# Patient Record
Sex: FEMALE | Race: WHITE
[De-identification: names, ages, dates, MRNs, and addresses within clinical notes are randomized per-mention and may not be internally consistent; named-entity substitution may affect disease eponyms.]

---

## 2018-05-22 LAB
BUN BLD-MCNC: 38 MG/DL (ref 6–20)
GLUCOSE SERPLBLD-MCNC: 315 MG/DL (ref 65–120)
HCT VFR BLD CALC: 36.7 % (ref 36–45)
INR BLD: 1.32
LYMPHOCYTES # SPEC AUTO: 0.8 K/UL (ref 0.7–4.9)
MCH RBC QN AUTO: 26.4 PG (ref 27–35)
MCV RBC: 83 FL (ref 80–100)
PMV BLD: 8.8 FL (ref 7.6–11.3)
POTASSIUM SERPL-SCNC: 4.4 MEQ/L (ref 3.6–5)
RBC # BLD: 4.43 M/UL (ref 3.86–4.86)

## 2018-05-22 NOTE — RAD REPORT
EXAM DESCRIPTION:  RAD - Chest Pa And Lat (2 Views) - 5/22/2018 2:18 pm

 

CLINICAL HISTORY:  Preop chest, pending pacemaker exchange

 

COMPARISON:  Chest August 2017

 

TECHNIQUE:  PA and lateral views of the chest were obtained.

 

FINDINGS:  The lungs are normal volume. Masslike density in the right midlung field is still present.
 Review of prior reports indicates a lung cancer history. No acute failure findings.   Cardiomegaly i
s present. No pleural effusion or pneumothorax seen.  No acute bony finding noted.  No aortic abnorma
lity.  Left subclavian pacemaker in place.

 

IMPRESSION:  Cardiomegaly without acute failure finding.

 

Masslike density right midlung field similar or less prominent than seen August 2017. The right lung 
field finding is presumed to be part of a lung cancer process previously detailed.

## 2018-05-22 NOTE — EKG
Test Date:    2018-05-22               Test Time:    13:30:01

Technician:   LYUBOV                                    

                                                     

MEASUREMENT RESULTS:                                       

Intervals:                                           

Rate:         62                                     

MS:                                                  

QRSD:         204                                    

QT:           526                                    

QTc:          533                                    

Axis:                                                

P:                                                   

MS:                                                  

QRS:          -63                                    

T:            117                                    

                                                     

INTERPRETIVE STATEMENTS:                                       

                                                     

Electronic ventricular pacemaker

Compared to ECG 10/11/2017 14:31:24

Sinus rhythm no longer present

First degree AV block no longer present

Right bundle-branch block no longer present

Left anterior fascicular block no longer present

Bifascicular block no longer present



Electronically Signed On 05-22-18 18:33:29 CDT by Ronald Davila

## 2018-05-23 ENCOUNTER — HOSPITAL ENCOUNTER (OUTPATIENT)
Dept: HOSPITAL 97 - CCL | Age: 83
Discharge: HOME HEALTH | End: 2018-05-23
Attending: SPECIALIST
Payer: COMMERCIAL

## 2018-05-23 VITALS — DIASTOLIC BLOOD PRESSURE: 73 MMHG | SYSTOLIC BLOOD PRESSURE: 157 MMHG | OXYGEN SATURATION: 97 % | TEMPERATURE: 98.2 F

## 2018-05-23 DIAGNOSIS — E11.9: ICD-10-CM

## 2018-05-23 DIAGNOSIS — I10: ICD-10-CM

## 2018-05-23 DIAGNOSIS — E78.2: ICD-10-CM

## 2018-05-23 DIAGNOSIS — Z45.010: Primary | ICD-10-CM

## 2018-05-23 DIAGNOSIS — I48.3: ICD-10-CM

## 2018-05-23 PROCEDURE — 85610 PROTHROMBIN TIME: CPT

## 2018-05-23 PROCEDURE — 33228 REMV&REPLC PM GEN DUAL LEAD: CPT

## 2018-05-23 PROCEDURE — 85025 COMPLETE CBC W/AUTO DIFF WBC: CPT

## 2018-05-23 PROCEDURE — 93005 ELECTROCARDIOGRAM TRACING: CPT

## 2018-05-23 PROCEDURE — 36415 COLL VENOUS BLD VENIPUNCTURE: CPT

## 2018-05-23 PROCEDURE — 80048 BASIC METABOLIC PNL TOTAL CA: CPT

## 2018-05-23 PROCEDURE — 71046 X-RAY EXAM CHEST 2 VIEWS: CPT

## 2018-05-23 PROCEDURE — 88300 SURGICAL PATH GROSS: CPT

## 2018-05-23 PROCEDURE — 85730 THROMBOPLASTIN TIME PARTIAL: CPT

## 2018-05-23 PROCEDURE — 82962 GLUCOSE BLOOD TEST: CPT

## 2018-05-23 NOTE — OP
Surgeon:  Nimesh Norris MD



Primary Care Physician:  Terry Gay M.D.



Procedure Performed:  Pacemaker generator replacement.



Indication:  Pacemaker reached an [place] trigger.



Procedure In Detail:  The patient was brought to the cardiac cath lab in a fasting state.  She had be
en off Eliquis, her last dose was on May 21st at 6 p.m., this is May 23rd.  She gave informed consent
.  She was prepared and draped in the usual sterile fashion, sedated with combination of fentanyl and
 versed, titrated to an adequate level of sedation.  The pacemaker device she had was implanted into 
the left pectoral region, was identified through palpation.  A 1% lidocaine was used to anesthetize t
he skin.  A sharp incision was made through the skin, blunt dissection was carried down to the pocket
.  Pocket was opened.  The old pacemaker was explanted.  A sponge was placed in the empty pocket, it 
was enlarged slightly using blunt dissection with gloved fingers.  The leads were removed from the ol
d pacemaker, placed on the new pacemaker.  The leads were tested via telemetry through the new pacema
ker.  Everything was found to be adequate.  The sponge was removed from the pocket.  The pocket was i
rrigated with antibiotic solution and the device was implanted into the pocket.  Pocket was closed wi
th a layer of 0 Polysorb, subcutaneous layer with 3-0 Polysorb, and a cutaneous layer of stainless st
eel staples.  There were no complications from the procedure. 



The new pacemaker is made by [employer], it is a model named [employer] MRI  IS-1, model L311,
 serial [id number].  The pacemaker explanted was implanted on 08/31/2011, explanted today.  It was a [employer] model S603, serial [id number], explanted because of normal battery depletion.  The lead in the right 
atrium was a [employer] Fineline II EZ Sterox bipolar, model 4469, serial #[id number]. The right ventricular
 lead is a [employer] IS-1 Bi Positive Fix, model 4135, serial #[id number] also made by [employer]. Both of t
hese leads were tested and reused.



Electrophysiologic Data:  At the right atrial, P-wave amplitude was 1.9 mV, pacing threshold was 0.5 
V at 0.4 milliseconds, impedance 458 ohms.  Right ventricular lead gave us an R-wave of 6.5 mV, pacin
g threshold 1.5 V at 0.4 milliseconds, 470 ohms.  Its set DDD rate response is , right atrial s
ensitivity 0.5, right ventricular 2.5.  Right atrial output 2.5 V, right ventricular output auto 0.4 
milliseconds for both, both are paced and sensed bipolar.  At the end of the procedure, sponge counts
 were correct.  Needle counts and instrument counts all correct.



Complications:  Complications from the procedure, none.



Estimated Blood Loss:  5 cc.



First Assistant:  Leatha Bergeron.





ADIEL/NATIVIDAD

DD:  05/23/2018 10:39:46Voice ID:  [id number]

DT:  05/23/2018 19:19:58Report ID:  [id number]

## 2018-05-23 NOTE — XMS REPORT
Clinical Summary

 Created on:May 23, 2018



Patient:Valarie Milian

Sex:Female

:1935

External Reference #:[id number]





Demographics







 Address  [address]



   [place] [address]

 

 Home Phone  [phone]

 

 Home Phone  [phone]

 

 Email Address  [email or web address]

 

 Preferred Language  English

 

 Marital Status  

 

 Sikhism Affiliation  Unknown

 

 Race  White

 

 Ethnic Group  Not  or 









Author







 Organization  [facility]

 

 Address  [address] [place] [address]









Support







 Name  Relationship  Address  Phone

 

 Nayely Milian  Child  [address]  [phone]



     [place] [address]  









Care Team Providers







 Name  Role  Phone

 

 Terry Gay MD  Primary Care Provider  [phone]









Allergies







 Active Allergy  Reactions  Severity  Noted Date  Comments

 

 Propafenone      2016  







Current Medications







 Prescription  Sig.  Disp.  Refills  Start Date  End Date  Status

 

 metFORMIN  Take 500 mg by          Active



 (GLUCOPHAGE) 500 MG  mouth 2 (two)          



 tablet  times a day          



   with meals.          

 

 lisinopril  Take 10 mg by          Active



 (PRINIVIL,ZESTRIL) 10  mouth daily.          



 MG tablet            

 

 apixaban (ELIQUIS) 5  Take 2.5 mg by          Active



 mg tablet  mouth 2 (two)          



   times a day.          

 

 sertraline (ZOLOFT)  Take 50 mg by          Active



 50 MG tablet  mouth daily.          

 

 atorvastatin  Take 40 mg by          Active



 (LIPITOR) 40 MG  mouth daily          



 tablet  with dinner.          

 

 lansoprazole  Take 30 mg by          Active



 (PREVACID) 30 MG  mouth daily          



 capsule  before          



   breakfast.          

 

 diphenhydrAMINE  Take 25 mg by          Active



 (BENADRYL) 25 mg  mouth nightly          



 tablet  as needed for          



   sleep.          

 

 cyanocobalamin 1000  Take 1,000 mcg          Active



 MCG tablet  by mouth          



   daily.          

 

 rosiglitazone  Take 2 mg by          Discontinued



 (AVANDIA) 2 MG tablet  mouth 2 (two)        7  



   times a day.          

 

 furosemide (LASIX) 40  Take 40 mg by          Discontinued



 mg tablet  mouth 2 (two)        7  



   times a day.          

 

 carvedilol (COREG)  Take 6.25 mg          Discontinued



 6.25 MG tablet  by mouth 2        7  



   (two) times a          



   day with          



   meals.          

 

 apixaban (ELIQUIS) 5  Take 5 mg by          Discontinued



 mg tablet  mouth 2 (two)        7  



   times a day.          

 

 metoprolol succinate  Take 1 tablet  30 tablet  0  2017  10/05/201  




 XL (TOPROL-XL) 50 mg  (50 mg total)        7  



 24 hr tablet  by mouth daily          



   for 30 days.          

 

 insulin GLARGINE  Inject 25  7.5 mL  0  2017  10/05/201  



 (LANTUS) 100 unit/mL  Units under        7  



 injection (vial)  the skin          



   nightly for 30          



   days.          

 

 insulin lispro  Inject 8 Units  10 mL  12  2017  10/05/201  



 (HumaLOG) 100 unit/mL  under the skin        7  



 injection  3 (three)          



   times a day          



   with meals for          



   30 days.          

 

 furosemide (LASIX) 20  Take 1 tablet  30 tablet  0  2017  10/05/201  




 mg tablet  (20 mg total)        7  



   by mouth daily          



   for 30 days.          

 

 HYDROcodone-acetamino  Take 1 tablet      2017  



 phen (NORCO)   by mouth every        7  



 mg per tablet  6 (six) hours          



   as needed for          



   moderate pain          



   or severe pain          



   for up to 14          



   days. Max          



   Daily Amount:          



   4 tablets          

 

 aspirin 81 mg  Chew 1 tablet  30 tablet  0  2017  10/05/201  



 chewable tablet  (81 mg total)        7  



   daily for 30          



   days.          

 

 nystatin (MYCOSTATIN)  Apply  15 g    2017  10/05/201  



 100,000 unit/gram  topically 2        7  



 powder  (two) times a          



   day for 30          



   days.          

 

 isosorbide  Take 1 tablet  30 tablet  0  2017  10/05/201  



 mononitrate (IMDUR)  (30 mg total)        7  



 30 MG 24 hr tablet  by mouth daily          



   for 30 days.          







Active Problems







 Problem  Noted Date

 

 Intertrochanteric fracture of left femur  2017

 

 Systolic heart failure, chronic  2017

 

 Acute kidney injury superimposed on CKD  2017

 

 Type 2 diabetes mellitus with hyperglycemia  2017

 

 Hip fracture requiring operative repair  2017







Encounters







 Date  Type  Specialty  Care Team  Description

 

 10/13/2017  Telephone  Woo Haley,  



     Surgery  MD  

 

 2017  Office Visit  Orthopedic  Woo Bee,  Left hip pain (Primary Dx
);



     Surgery  MD  Intertrochanteric fracture of left femur, closed, with 
routine healing, subsequent encounter

 

 2017  Orders Only  Orthopedic  Woo Bee,  Intertrochanteric



     Surgery  MD  fracture of left femur,



         closed, with routine



         healing, subsequent



         encounter (Primary Dx)

 

 2017  Abstract  Woo Haley Surgery MD  

 

 2017  Office Visit  Woo Haley  Intertrochanteric



     Surgery  MD  fracture of left femur,



         closed, with routine



         healing, subsequent



         encounter (Primary Dx)

 

 2017  Anesthesia Event  Orthopedic  Ofelia,  



     Surgery  MD Marie  

 

 2017  Procedure Pass  Orthopedic    



     Surgery    

 

 2017  Surgery  Woo Haley,  RADHA,



     Surgery  MD  INTRAMEDULLARY NAIL,



         TIBIA

 

 2017  Procedure Pass  General Surgery    

 

 2017  Anesthesia Event  General Surgery  Conrad Zaragoza MD  

 

 2017  Orders Only  Procedural  Brittany Monaconda  



     Cardiology    

 

 2017  Procedure Pass  General Surgery    

 

 2017 -  Hospital Encounter  Orthopedic  Israel,  



 2017    Surgery  MD Sis Montez Ejaz,  



       Sherine Ruby MD  



after 2017



Social History







 Tobacco Use  Types  Packs/Day  Years Used  Date

 

 Never Smoker        









 Alcohol Use  Drinks/Week  oz/Week  Comments

 

 No      









 Sex Assigned at Birth  Date Recorded

 

 Not on file  







Last Filed Vital Signs







 Vital Sign  Reading  Time Taken

 

 Blood Pressure  135/61  2017  7:24 PM CDT

 

 Pulse  64  2017  7:24 PM CDT

 

 Temperature  35.7 C (96.3 F)  2017  7:24 PM CDT

 

 Respiratory Rate  16  2017  7:24 PM CDT

 

 Oxygen Saturation  95%  2017  7:24 PM CDT

 

 Inhaled Oxygen Concentration  -  -

 

 Weight  88.9 kg (196 lb)  2017  5:13 PM CDT

 

 Height  152.4 cm (5')  2017  5:13 PM CDT

 

 Body Mass Index  38.28  2017  5:13 PM CDT







Plan of Treatment







 Health Maintenance  Due Date  Last Done  Comments

 

 DIABETIC FOOT EXAM  1945    

 

 DIABETIC RETINAL EYE EXAM  1945    

 

 SHINGRIX VACCINE (#1)  1985    

 

 ZOSTER VACCINE  1995    

 

 PNEUMOCOCCAL POLYSACCHARIDE VACCINE AGE 65 AND OVER  2000    

 

 PNEUMOCOCCAL-13  2000    

 

 INFLUENZA VACCINE  2018    







Implants







 Implanted  Type  Area    Device  Expiration  Model /



         Identifier  Date  Serial /



             Lot

 

 T2 F/T Locking Screw 5mmx32.5mm - [id number]  IPM IMPLANT  Left:  SIRENA      
1896 [id number] /



 Implanted: 2017 (Quantity not on file)  DEVICES  Femur  ORTHOPEDICS     
  /

 

 Nail Im Trchntrc W/ Set Scr Ti 130deg [id number] Strl - [id number]  
Orthopedic  Left:  SIRENA    2022  3130 [id number] /



 Implanted: 2017 (Quantity not on file)  Trauma  Femur  ORTHOPEDICS       
/



   Implants          [id number]

 

 Shaft Trnkl Modlr 448mm Strl - [id number]  Orthopedic  N/A: N/A  SIRENA      0227 3000S /



 Implanted: Qty: 1 on 2017 by Woo Bee MD  Trauma    ORTHOPEDICS   
    /



   Implants          [id number]

 

 Screw Bone Lag Ti [id number] - [id number]  Orthopedic  Left:  SIRENA      3060 
0100S /



 Implanted: 2017 (Quantity not on file)  Trauma  Femur  ORTHOPEDICS       
/



   Implants          







Procedures







 Procedure Name  Priority  Date/Time  Associated Diagnosis  Comments

 

 CONSULT TO OSTOMY CARE  Routine  2017  6:55 PM    



 NURSE    CDT    

 

 IA AN ELECTIVE  Routine  2017  2:10 PM    



 ENDOTRACHEAL AIRWAY    CDT    









   Procedure Note - Conrad Zaragoza MD - 2017  2:09 PM CDT



Airway



   Performed by: CONRAD ZARAGOZA



   Authorized by: CONRAD ZARAGOZA



   



   Location:  OR



   Urgency:  Elective



   Difficult Airway: No



   Anesthesiologist:  CONRAD ZARAGOZA



   Performed by:



      anesthesiologist



   Preoxygenated with 100% O2: Yes



   C-spine Precautions Maintained Throughout: No



   Mask assessment prior to intubation: RSI-mask ventilation not attempted.



   Final Airway Type:  Endotracheal airway



   Final Endotracheal Airway:  ETT



   Cuffed: Yes



   Technique Used:  Direct laryngoscopy



   Insertion Site:  Oral



   Blade Type:  Jiménez



   Laryngoscope Blade/Videolaryngoscope Blade Size:  2



   ETT Size (mm):  7.0



   Cuff at minimum occlusion pressure: Yes



   Measured from:  Gums



   ETT to Gums (cm):  20



   Placement Verified by: CO2 detection



   Laryngoscopic view:  Grade I - full view of glottis



   Rapid Sequence Induction (RSI): Yes



   Modified RSI: No



   Number of Attempts at Approach:  1









 INSERTION, INTRAMEDULLARY    2017 11:00 AM CDT    



 NAIL, TIBIA        

 

 ECHOCARDIOGRAM 2D COMPLETE  Routine  2017  7:31 AM CDT    Results for 
this



 W MMODE SPECTRAL COLOR        procedure are in the



 DOPPLER (20994)        results section.



after 2017



Results

POC glucose (2017  5:24 PM)Only the most recent of54 resultswithin the 
time period is included.





 Component  Value  Ref Range

 

 POC glucose  215 (H)  65 - 99 mg/dL



   Comment:  



   [facility] Notified RN  



   Meter ID: [id number]  



   : Jase Hendrickson  



     









 Specimen  Performing Laboratory

 

   [facility] DEPARTMENT OF PATHOLOGY AND GENOMIC MEDICINE







   [address] [place] [address]



Estimated GFR (2017  3:05 AM)Only the most recent of14 resultswithin the 
time period is included.





 Component  Value  Ref Range

 

 GFR Non Af Amer  48 (A)  mL/min/1.73 m2

 

 GFR Af Amer  58 (A)  mL/min/1.73 m2



   Comment:  



   Chronic kidney disease: <60 mL/min/1.73m2  



   Kidney failure: <15 mL/min/1.73m2  



   The estimated GFR is calculated from the IDMS-traceable Modification of Diet
  



   in Renal Disease Equation. The accuracy of the calculation is poor when the  



   creatinine is normal. Calculated values >90 mL/min/1.73m2 are not reported.  



   This equation has not been validated in children (<18 years), pregnant  



   women, the elderly (>70 years), or ethnic groups other than Caucasians and  



    Americans.  



     









 Specimen  Performing Laboratory

 

 Plasma specimen  [facility] DEPARTMENT OF PATHOLOGY AND GENOMIC MEDICINE







   [address] [place] [address]



CBC with platelet and differential (2017  3:05 AM)Only the most recent 
of10 resultswithin the time period is included.





 Component  Value  Ref Range

 

 WBC  6.07  4.50 - 11.00 k/uL

 

 RBC  3.57 (L)  4.20 - 5.50 m/uL

 

 HGB  9.9 (L)  12.0 - 16.0 g/dL

 

 HCT  32.7 (L)  37.0 - 47.0 %

 

 MCV  91.6  82.0 - 100.0 fL

 

 MCH  27.7  27.0 - 34.0 pg

 

 MCHC  30.3 (L)  31.0 - 37.0 g/dL

 

 RDW - SD  52.7  37.0 - 55.0 fL

 

 MPV  10.3  8.8 - 13.2 fL

 

 Platelet count  211  150 - 400 k/uL

 

 Nucleated RBC  0.00  /100 WBC

 

 Neutrophils  64.5  39.0 - 69.0 %

 

 Lymphocytes  16.5 (L)  25.0 - 45.0 %

 

 Monocytes  15.7 (H)  0.0 - 10.0 %

 

 Eosinophils  1.8  0.0 - 5.0 %

 

 Basophils  0.3  0.0 - 1.0 %

 

 Immature granulocytes  1.2 (H)Comment: "Immature granulocytes"  0.0 - 1.0 %



   (promyelocytes, myelocytes, metamyelocytes)  









 Specimen  Performing Laboratory

 

 Blood  [facility] DEPARTMENT OF PATHOLOGY AND [facility] MEDICINE







   [address] [place] [address]



Basic metabolic panel (2017  3:05 AM)Only the most recent of13 
resultswithin the time period is included.





 Component  Value  Ref Range

 

 Sodium  137  135 - 148 mEq/L

 

 Potassium  5.3 (H)  3.5 - 5.0 mEq/L

 

 Chloride  102  98 - 112 mEq/L

 

 CO2  23 (L)  24 - 31 mEq/L

 

 Anion gap  12  7 - 15 mEq/L



   Comment:  



   Starting from  , anion gap calculation  



   no longer incorporates potassium. Please note the change.  



     

 

 BUN  50 (H)  8 - 23 mg/dL

 

 Creatinine  1.1 (H)  0.5 - 0.9 mg/dL

 

 Glucose  133 (H)  65 - 99 mg/dL

 

 Calcium  8.5 (L)  8.8 - 10.2 mg/dL









 Specimen  Performing Laboratory

 

 Plasma specimen  [facility] DEPARTMENT OF PATHOLOGY AND [address] [address]



XR Chest 1 Vw Portable (2017  6:26 AM)Only the most recent of2 
resultswithin the time period is included.





 Specimen  Performing Laboratory

 

    [facility]







   [address] [place] [id number]









 Narrative

 

 EXAMINATION:XR CHEST 1 VW PORTABLE







  







 CLINICAL HISTORY: 82 years Female SHORTNESS OF BREATH [facility]







  







 COMPARISON:Most recent prior at [facility]







  







 IMPRESSION:







  







 1.Left chest wall cardiac device is similar to prior. The cardiomediastinal



 silhouette is enlarged, stable. Calcification in the aortic arch. 







  







 2.There is interstitial prominence bilaterally which may reflect some 
edema.



 There is a stable masslike opacity in the right perihilar region. Follow-up CT 
scan



 can be obtained for further characterization when feasible. Small right-sided 
pleural



 







 effusion. No pneumothorax.







  







 3.There is a sclerotic lesion in the proximal left humerus, incompletely 
imaged,



 present on priors. It may represent a low-grade cartilaginous lesion such as an



 enchondroma.







  







  







  







  







 [facility]-[id number]







 









 Procedure Note

 

 [facility], Radiology Results Incoming - 2017  7:19 AM CDT



EXAMINATION:  XR CHEST 1 VW PORTABLE



 



 CLINICAL HISTORY: 82 years Female SHORTNESS OF BREATH [facility]



 



 COMPARISON:  Most recent prior at [facility]



 



 IMPRESSION:



 



 1.  Left chest wall cardiac device is similar to prior. The cardiomediastinal 
silhouette is enlarged, stable. Calcification in the aortic arch.



 



 2.  There is interstitial prominence bilaterally which may reflect some edema. 
There is a stable masslike opacity in the right perihilar region. Follow-up CT 
scan can be obtained for further characterization when feasible. Small right-
sided pleural



 effusion. No pneumothorax.



 



 3.  There is a sclerotic lesion in the proximal left humerus, incompletely 
imaged, present on priors. It may represent a low-grade cartilaginous lesion 
such as an enchondroma.



 



 



 



 



 [facility]-[id number]



US Renal (2017  5:27 PM)





 Specimen  Performing Laboratory

 

   [facility]







   [address] [place] [address]









 Narrative

 

 EXAMINATION:US RENAL







  







 CLINICAL HISTORY:Elevated abnormal renal function tests







  







 COMPARISON:None.







  







 FINDINGS: Both kidneys are echogenic and moderate renal cortical atrophy is 
present.



 Findings are consistent with chronic renal disease. There is no evidence of



 hydronephrosis.







  







 The right kidney measures 9.2 x 3.6 x 3.2 cm. The left kidney measures 9.3 x 
4.3 x



 4.6 cm..







  







  







 The urinary bladder is not visualized.







  







 IMPRESSION:







  







 Normal renal ultrasound examination.







  







  







 [facility]-[id number]







 









 Procedure Note

 

  Interface, Radiology Results Incoming - 2017  7:48 PM CDT



EXAMINATION:  US RENAL



 



 CLINICAL HISTORY:  Elevated abnormal renal function tests



 



 COMPARISON:  None.



 



 FINDINGS: Both kidneys are echogenic and moderate renal cortical atrophy is 
present. Findings are consistent with chronic renal disease. There is no 
evidence of hydronephrosis.



 



 The right kidney measures 9.2 x 3.6 x 3.2 cm. The left kidney measures 9.3 x 
4.3 x 4.6 cm..



 



 



 The urinary bladder is not visualized.



 



 IMPRESSION:



 



 Normal renal ultrasound examination.



 



 



 [facility]-[id number]



Urinalysis screen and microscopy, with reflex to culture (2017  9:11 AM)





 Component  Value  Ref Range

 

 Specimen site  Cabrera  

 

 Color, UA  Yellow  

 

 Appearance, UA  Hazy  

 

 Specific gravity, UA  1.015  1.001 - 1.035

 

 pH, UA  5.0  5.0 - 8.5

 

 Protein, UA  Negative  Negative

 

 Glucose, UA  Negative  Negative

 

 Ketones, UA  Negative  Negative

 

 Bilirubin, UA  Negative  Negative

 

 Blood, UA  Moderate (A)  Negative

 

 Nitrite, UA  Negative  Negative

 

 Urobilinogen, UA  <2.0  <2.0

 

 Leukocyte esterase, UA  Trace (A)  Negative

 

 Epithelial cells, UA  2  /HPF

 

 WBC, UA  4  0 - 4 /HPF

 

 RBC, UA  18 (H)  0 - 2 /HPF

 

 Bacteria, UA  Few  None seen

 

 Yeast, UA  None seen  

 

 Yeast with pseudohyphae, UA  None seen  

 

 Hyaline casts, UA  7  /LPF









 Specimen  Performing Laboratory

 

 Urine  [facility] DEPARTMENT OF PATHOLOGY AND GENOMIC MEDICINE







   [address] [place] [phone]



Urea nitrogen, urine, random (2017  9:11 AM)Only the most recent of2 
resultswithin the time period is included.





 Component  Value  Ref Range

 

 Urea nitrogen, urine, random  560  mg/dL









 Specimen  Performing Laboratory

 

 Urine  [facility] DEPARTMENT OF PATHOLOGY AND GENOMIC MEDICINE







   [address] [place] [address]



Creatinine level, urine, random (2017  9:11 AM)Only the most recent of2 
resultswithin the time period is included.





 Component  Value  Ref Range

 

 Creatinine, urine, random  109  mg/dL









 Specimen  Performing Laboratory

 

 Urine  [facility] DEPARTMENT OF PATHOLOGY AND [facility] MEDICINE







   [address] [place] [address]



Gram stain (2017  9:11 AM)





 Component  Value  Ref Range

 

 Gram stain result  No WBC's or organisms seen.  



   Comment:  



   Specimen Information  



   Specimen Source: Urine  



   Specimen Site: Random void  



     









 Specimen  Performing Laboratory

 

 Urine - Random void  [facility] DEPARTMENT OF PATHOLOGY AND [facility] MEDICINE







   [address] [place] [phone]



Urine culture (2017  9:11 AM)





 Component  Value  Ref Range

 

 Urine culture isolate  No growth after 2 days.  



   Comment:  



   Specimen Information  



   Specimen Source: Urine  



   Specimen Site: Random void  



     









 Specimen  Performing Laboratory

 

 Urine - Random void  [facility] DEPARTMENT OF PATHOLOGY AND GENOMIC MEDICINE







   [address] [place] [address]



B natriuretic peptide (2017  4:00 AM)Only the most recent of3 
resultswithin the time period is included.





 Component  Value  Ref Range

 

 BNP  1,141 (H)  0 - 100 pg/mL









 Specimen  Performing Laboratory

 

 Blood  [facility] DEPARTMENT OF PATHOLOGY AND GENOMIC MEDICINE







   [address] [place] [address]



Hemoglobin A1c (2017  3:30 AM)





 Component  Value  Ref Range

 

 Hemoglobin A1C  10.2 (H)  4.0 - 5.6 %



   Comment:  



   HbA1c cutoffs for diagnosing diabetes:  



   4.0% - 5.6%=normal  



   5.7% - 6.4%=increased risk for diabetes (prediabetes)  



   >=6.5%=diabetes  



   Goals for glycemic control (ADA 2016)  



   < 7.0%Target for nonpregnant adults with diabetes.  



   More or less stringent targets may be appropriate for  



   individual patients.  



   <7.5% Target for Children and adolescents with type 1  



   diabetes.  



     









 Specimen  Performing Laboratory

 

 Blood  [facility] DEPARTMENT OF PATHOLOGY AND [facility] MEDICINE







   [address] [place] [phone]



XR Hip 1 View Left (2017  4:04 PM)





 Specimen  Performing Laboratory

 

   [address] [address]









 Narrative

 

 EXAM:XR HIP 1 VIEW LEFT







  







 CLINICAL:Post Op







  







 COMPARISON:None.







  







 IMPRESSION:







 1.Intramedullary ivan and dynamic screw fixation of left femoral neck 
fracture.



 Hardware intact. Well aligned.







 2.Small residual subcutaneous emphysema.







 3.Vascular calcifications.







  







  







  







 [facility]-[id number]







 









 Procedure Note

 

  Interface, Radiology Results Incoming - 2017  4:09 PM CDT



EXAM:  XR HIP 1 VIEW LEFT



 



 CLINICAL:  Post Op



 



 COMPARISON:  None.



 



 IMPRESSION:



 1.  Intramedullary ivan and dynamic screw fixation of left femoral neck 
fracture. Hardware intact. Well aligned.



 2.  Small residual subcutaneous emphysema.



 3.  Vascular calcifications.



 



 



 



 [facility]-[id number]



OR FL &gt; I Hour (2017  2:40 PM)





 Specimen  Performing Laboratory

 

   [address] [address]









 Narrative

 

 IMPRESSION: C-arm fluoroscopy over 1 hour was provided in the OR for the 
referring



 physician. A radiologist was not present during the procedure. Refer to the 
Operative



 report issued by the performing provider for procedure details. 







  







 LOCATION: [place] [facility] OR 16







 PROCEDURE: C-Arm for ORIF INTRAMEDULLARY NAIL, LEFT HIP







 START TIME: 1330







 FINISH TIME: 1440







 FLUORO TIME: 1 min 55 sec







 DOSE: mGy:7.86







 TECH(S): ADARSH JD







 









 Procedure Note

 

 [facility], Radiology Results Incoming - 2017  1:11 PM CDT



IMPRESSION: C-arm fluoroscopy over 1 hour was provided in the OR for the 
referring physician. A radiologist was not present during the procedure.



 Refer to the Operative report issued by the performing provider for procedure 
details.



 



 LOCATION: [facility] OR 16



 PROCEDURE: C-Arm for ORIF INTRAMEDULLARY NAIL, LEFT HIP



 START TIME: 1330



 FINISH TIME: 1440



 FLUORO TIME: 1 min 55 sec



 DOSE: mGy:  7.86



 TECH(S): ADARSH JD



Prothrombin time with INR (2017  8:43 AM)Only the most recent of2 
resultswithin the time period is included.





 Component  Value  Ref Range

 

 Prothrombin time  16.3 (H)  12.0 - 15.0 sec

 

 INR  1.3  



   Comment:  



   The International Normalized Ratio (INR) is a therapeutic  



   monitoring tool for patients who are stable on oral  



   anticoagulant therapy. An INR of 2.0-3.0 is suggested for deep  



   vein thrombosis/pulmonary embolism.  



     









 Specimen  Performing Laboratory

 

 Blood  [facility] DEPARTMENT OF PATHOLOGY AND GENOMIC MEDICINE







   [address] [place] [address]



Urinalysis, automated with microscopy (2017 11:10 AM)





 Component  Value  Ref Range

 

 Color, UA  Yellow  

 

 Appearance, UA  Hazy  

 

 Specific gravity, UA  1.014  1.001 - 1.035

 

 pH, UA  5.0  5.0 - 8.5

 

 Protein, UA  Negative  Negative

 

 Glucose, UA  1+ (A)  Negative

 

 Ketones, UA  Negative  Negative

 

 Bilirubin, UA  Negative  Negative

 

 Blood, UA  Moderate (A)  Negative

 

 Nitrite, UA  Negative  Negative

 

 Urobilinogen, UA  <2.0  <2.0

 

 Leukocyte esterase, UA  Small (A)  Negative

 

 Epithelial cells, UA  2  /HPF

 

 WBC, UA  24 (H)  0 - 4 /HPF

 

 RBC, UA  35 (H)  0 - 2 /HPF

 

 Bacteria, UA  Few  None seen

 

 Hyaline casts, UA  7  /LPF

 

 WBC clumps, UA  Few (A)  

 

 Yeast, UA  None seen  

 

 Yeast with pseudohyphae, UA  None seen  









 Specimen  Performing Laboratory

 

 Urine  [facility] DEPARTMENT OF PATHOLOGY AND [facility] MEDICINE







   [address] [place] [address]



Phosphorus level (2017  3:05 AM)Only the most recent of2 resultswithin 
the time period is included.





 Component  Value  Ref Range

 

 Phosphorus  4.7 (H)  2.4 - 4.5 mg/dL









 Specimen  Performing Laboratory

 

 Plasma specimen  [facility] DEPARTMENT OF PATHOLOGY AND [address] [address]



Magnesium level (2017  3:05 AM)Only the most recent of2 resultswithin the 
time period is included.





 Component  Value  Ref Range

 

 Magnesium  2.3  1.6 - 2.4 mg/dL









 Specimen  Performing Laboratory

 

 Plasma specimen  [facility] DEPARTMENT OF PATHOLOGY AND [address] [address]



Comprehensive metabolic panel (2017  3:05 AM)





 Component  Value  Ref Range

 

 Sodium  135  135 - 148 mEq/L

 

 Potassium  5.4 (H)  3.5 - 5.0 mEq/L

 

 Chloride  98  98 - 112 mEq/L

 

 CO2  23 (L)  24 - 31 mEq/L

 

 Anion gap  14  7 - 15 mEq/L



   Comment:  



   Starting from  , anion gap calculation  



   no longer incorporates potassium. Please note the change.  



     

 

 BUN  43 (H)  8 - 23 mg/dL

 

 Creatinine  2.2 (H)  0.5 - 0.9 mg/dL

 

 Glucose  188 (H)  65 - 99 mg/dL

 

 Calcium  8.9  8.8 - 10.2 mg/dL

 

 Protein  6.7  6.3 - 8.3 g/dL



   Comment:  



    4.6-7.0 g/dL  



   1 week 4.4-7.6 g/dL  



   7 months-1year5.1-7.3 g/dL  



   1-2 years5.6-7.5 g/dL  



   >3 years6.0-8.0 g/dL  



    6.3-8.3 g/dL  



     

 

 Albumin  2.5 (L)  3.5 - 5.0 g/dL

 

 A/G ratio  0.6 (L)  0.7 - 3.8

 

 Alkaline phosphatase  138 (H)  35 - 104 U/L

 

 AST  17  10 - 35 U/L

 

 ALT  10  5 - 50 U/L

 

 Total bilirubin  0.6  0.0 - 1.2 mg/dL









 Specimen  Performing Laboratory

 

 Plasma specimen  [facility] DEPARTMENT OF PATHOLOGY AND [facility] MEDICINE







   [address] [place] [address]



Echocardiogram complete w contrast and 3D if needed (2017  7:31 AM)





 Specimen  Performing Laboratory

 

    [facility]







   [address] [place] [address]









 Narrative

 

  







 Echocardiography Report







  [address] [facility], [facility] [address], [place] [address]







  







 Pat.Name:Gary MILIAN.ID:[id number]
 







 .Date: 2017 Refer.MD:AJ THAKUR MD







 Exam Time: 6:48:00 AMStudy Type:Routine



 Echo







 Height:145cm



 Weight:94.6kg







 BSA: 1.83 m2



 DOBAge:1935,82Y 







 Sex:



 FEMALEBP:140/63




 







 Sonogrphr: Radha Cleaning, RDCS, RVT







 Pat. Stat.:Inpatient 







 Room:[address] Study



 Status:Final 







 Echo Event ID:[id number]







 Order ID:[id number]







 Reason for Study:H/o CHF 







 History / Clinical:Diabetes, Hypertension, Congestive Heart Failure,







 Dyspnea On Exertion, Obesity, GI bleed, Anemia, H/o bilateral pleural







 effusion







 Procedures:2D Echo, Colorflow Doppler







 Race:C 







 ------------------------------------







 SUMMARY:







 ------------------------------------







 LV systolic function is moderately to severely depressed.







  RV systolic function is moderately depressed.







 ------------------------------------







 FINDINGS:







 ------------------------------------







 LV: LV size is normal. There isconcentric LV hypertrophy. LV







 systolicfunction is moderately to severely depressed.







 EstimatedEF is 30-34%. Septal motion is paradoxical







 secondaryto LBBB or conduction abnormality.







 RV: RV size is normal. A pacemaker wire is seen in the RV. RV







 systolicfunction is moderately depressed. Apical







 hypokinesis 







 LA: LA size is normal.







 RA: RA volume is moderately enlarged. A pacemaker wire is seen.







 AO: Aortic root diameter is normal.







 JESICA: No pericardial effusion.







 IAS:Interatrial septum bulges to the left, consistent with high







 RApressure.







 AV: Moderate thickening and calcification of AV leaflets.







 MV: Mild mitral annular calcification. Thickened and/or







 calcifiedchordae. Calcified papillary muscle.







 PV: No structural PV abnormalities noted. A trace of pulmonic







 regurgitation. 







 TV: No structural TV abnormalities noted. Mild tricuspid







 regurgitation 







 [place]: LV relaxation is impaired. LV filling pressure is borderline







 elevated. 







 Other:Estimated PA systolic pressure is 60 mmHg, assuming a mean







 RAPof 15 mmHg.







 ------------------------------------







 MEASUREMENTS:







 ------------------------------------







 2D







 Parasternal Long Axis







  LVOT 2.4 cmAo



 An2.5 cm







  LVIDd4.6 cmIndex
2.5



 cm/m







  Ao Rtd 3 cm







  Index1.6 cm/m







  LVIDs4.1 cmLV



 Wzce325.4 g()







  LV%fs 10.9 % LVM Index
206.8



 g/m2







  IVSd 2



 cmRWT0.7 







  LVPWd1.6 cm 







 RA Sng Plane







  RA Area 23.6 cm2(8.3-19.5) RA Vol
88



 ml







  Index48.1 ml/m







  RA LngAx 5.4 cm







  







 ------------------------------------







 WALL MOTION:







 ------------------------------------







 RESTING WALL MOTION:







 Basal Inferoseptal, Basal Inferior walls are akinetic.Mid







 Inferoseptal, Mid Inferolateral, Apical Septal, Apical Inferior,







 Apical walls are hypokinetic.Basal Anterior, Basal Anteroseptal,







 Basal Inferolateral, Basal Anterolateral, Mid Anterior, Mid







 Anteroseptal, Mid Inferior, Mid Anterolateral, Apical Anterior, Apical







 Lateral walls are mildly hypokinetic.







 Wall Index=1.8







 Signed 2017 12:12 PM







 Radha Diane M.D.









 Procedure Note

 

 Interface, Radiology Results In - 2017 12:13 PM CDT







                         Echocardiography Report



            [address] [address]



 



 Pat.Name:  VALARIE MILIAN          Pat.ID:    [id number]



 .Date:   2017               Refer.MD:  AJ THAKUR MD



 Exam Time: 6:48:00 AM              Study Type:Routine Echo



 Height:    145cm                   Weight:    94.6kg



 BSA:       1.83 m2                   Age:  1935,82Y



 Sex:       FEMALE                  BP:        140/63



 Sonogrphr: Radha Cleaning RDCS, RVT



 Pat. Stat.:Inpatient



 Room:      D760 A                 Study Status:Final



 Echo Event ID:[id number]



 Order ID:  [id number]



 Reason for Study:H/o CHF



 History / Clinical:Diabetes, Hypertension, Congestive Heart Failure,



 Dyspnea On Exertion, Obesity, GI bleed, Anemia, H/o bilateral pleural



 effusion



 Procedures:2D Echo, Colorflow Doppler



 Race:      C



 ------------------------------------



 SUMMARY:



 ------------------------------------



 LV systolic function is moderately to severely depressed.



  RV systolic function is moderately depressed.



 ------------------------------------



 FINDINGS:



 ------------------------------------



 LV:       LV size is normal. There is  concentric LV hypertrophy. LV



           systolic  function is moderately to severely depressed.



           Estimated  EF is 30-34%. Septal motion is paradoxical



           secondary  to LBBB or conduction abnormality.



 RV:       RV size is normal. A pacemaker wire is seen in the RV. RV



           systolic  function is moderately depressed. Apical



           hypokinesis



 LA:       LA size is normal.



 RA:       RA volume is moderately enlarged. A pacemaker wire is seen.



 AO:       Aortic root diameter is normal.



 [place]:     No pericardial effusion.



 IAS:      Interatrial septum bulges to the left, consistent with high



           RA  pressure.



 AV:       Moderate thickening and calcification of AV leaflets.



 MV:       Mild mitral annular calcification. Thickened and/or



           calcified  chordae. Calcified papillary muscle.



 PV:       No structural PV abnormalities noted. A trace of pulmonic



           regurgitation.



 TV:       No structural TV abnormalities noted. Mild tricuspid



           regurgitation



 [place]:     LV relaxation is impaired. LV filling pressure is borderline



           elevated.



 Other:    Estimated PA systolic pressure is 60 mmHg, assuming a mean



           RAP  of 15 mmHg.



 ------------------------------------



 MEASUREMENTS:



 ------------------------------------



                                   2D



 Parasternal Long Axis



  LVOT             2.4 cm            Ao An            2.5 cm



  LVIDd            4.6 cm            Index        2.5 cm/m



  Ao Rtd             3 cm



  Index        1.6 cm/m



  LVIDs            4.1 cm            LV Mass        378.4 g    ()



  LV%fs           10.9 %             LVM Index      206.8 g/m2



  IVSd               2 cm            RWT              0.7



  LVPWd            1.6 cm



 RA Sng Plane



  RA Area         23.6 cm2  (8.3-19.5) RA Vol            88 ml



  Index        48.1 ml/m



  RA LngAx         5.4 cm



 



 ------------------------------------



 WALL MOTION:



 ------------------------------------



 RESTING WALL MOTION:



 Basal Inferoseptal, Basal Inferior walls are akinetic.  Mid



 Inferoseptal, Mid Inferolateral, Apical Septal, Apical Inferior,



 Apical walls are hypokinetic.  Basal Anterior, Basal Anteroseptal,



 Basal Inferolateral, Basal Anterolateral, Mid Anterior, Mid



 Anteroseptal, Mid Inferior, Mid Anterolateral, Apical Anterior, Apical



 Lateral walls are mildly hypokinetic.



 Wall Index=1.8



 Signed 2017 12:12 PM



 Radha Diane M.D.



Antibody identification (2017  4:00 AM)





 Component  Value  Ref Range

 

 Antibody ID  POS, Anti-E  

 

 Antibody ID  POS, Undetermined Specificity  









 Specimen  Performing Laboratory

 

   [facility] DEPARTMENT OF PATHOLOGY AND GENOMIC MEDICINE







   [address] [place] [address]



Type and screen (2017  4:00 AM)





 Component  Value  Ref Range

 

 ABO grouping  A  

 

 Rh type  POS  

 

 Antibody screen (gel)  POS  









 Specimen  Performing Laboratory

 

 Blood  [facility] DEPARTMENT OF PATHOLOGY AND GENOMIC MEDICINE







   [address] [place] [address]



XR Hip 2-3 View Left (2017  3:23 AM)





 Specimen  Performing Laboratory

 

   [address] [address]









 Narrative

 

 XR HIP 2-3 VIEWS LEFT







  







 CLINICAL INDICATION:fracture







  







 COMPARISON:None.







  







 IMPRESSION:







  







 There is a mildly angulated intertrochanteric fracture of the proximal left 
femur. No



 other acute fracture is identified. There is no hip dislocation.







  







 The bones are demineralized. There are degenerative changes of both hips. 
There is



 partially visualized hardware within the proximal right femoral shaft. There 
are



 vascular calcifications, compatible with peripheral vascular disease.







  







  







  







  







 [facility]-[id number]







 









 Procedure Note

 

  Interface, Radiology Results Incoming - 2017  3:30 AM CDT



XR HIP 2-3 VIEWS LEFT



 



 CLINICAL INDICATION:  fracture



 



 COMPARISON:  None.



 



 IMPRESSION:



 



 There is a mildly angulated intertrochanteric fracture of the proximal left 
femur. No other acute fracture is identified. There is no hip dislocation.



 



 The bones are demineralized. There are degenerative changes of both hips. 
There is partially visualized hardware within the proximal right femoral shaft. 
There are vascular calcifications, compatible with peripheral vascular disease.



 



 



 



 



 [facility]-[id number]



XR Femur 2 Vw Left (2017  3:23 AM)





 Specimen  Performing Laboratory

 

   [address] [address]









 Narrative

 

 XR FEMUR 2 VW LEFT







  







 CLINICAL INDICATION:fracture







  







 COMPARISON:None.







  







 IMPRESSION:







  







 There is a mildly angulated intertrochanteric fracture of the proximal left 
femur. No



 other acute fracture is identified. There is no hip dislocation.







  







 The bones are demineralized. There are degenerative changes of both hips. 
There is



 partially visualized hardware within the proximal right femoral shaft. There 
are



 vascular calcifications, compatible with peripheral vascular disease.







  







  







  







  







 [facility]-[id number]







 









 Procedure Note

 

  Interface, Radiology Results Incoming - 2017  3:30 AM CDT



XR FEMUR 2 VW LEFT



 



 CLINICAL INDICATION:  fracture



 



 COMPARISON:  None.



 



 IMPRESSION:



 



 There is a mildly angulated intertrochanteric fracture of the proximal left 
femur. No other acute fracture is identified. There is no hip dislocation.



 



 The bones are demineralized. There are degenerative changes of both hips. 
There is partially visualized hardware within the proximal right femoral shaft. 
There are vascular calcifications, compatible with peripheral vascular disease.



 



 



 



 



 [facility]-[id number]



ECG 12 lead (2017  9:35 PM)





 Component  Value  Ref Range

 

 Ventricular rate  60  

 

 Atrial rate  68  

 

 QRSD interval  156  

 

 QT interval  512  

 

 QTC interval  512  

 

 QRS axis 1  -60  

 

 T wave axis  -88  

 

 EKG impression  Wide QRS rhythm-Right bundle branch block-Left anterior 
fascicular block-^^^ Bifascicular block ^^^-Left ventricular hypertrophy with 
repolarization abnormality-Abnormal ECG-In automated comparison with ECG of 10-
OCT-2015 12:50,-Wide QRS rhythm has  



   replaced Electronic ventricular pacemaker-Electronically Signed By Oleg Osuna MD ([id number]) on 2017 9:35:40 PM  



     









 Specimen  Performing Laboratory

 

   [facility] MUSE







   [address] [place] [address]



Partial thromboplastin time, activated (2017  9:00 PM)





 Component  Value  Ref Range

 

 PTT  26.8  23.0 - 36.0 sec



   Comment:  



   PTT therapeutic range for unfractionated heparin is  



   61.0-112.0 seconds which corresponds to Anti-Xa  



   0.3-0.7 U/ml.  



     









 Specimen  Performing Laboratory

 

 Blood  [facility] DEPARTMENT OF PATHOLOGY AND GENOMIC MEDICINE







   [address] [place] [address]



Troponin (2017  7:27 PM)





 Component  Value  Ref Range

 

 Troponin  <0.30  0.00 - 0.30 ng/mL



   Comment:  



   0.30 - 1.49 ng/mlMay indicate increased risk of acute
  



    coronary syndrome.
  



   >=1.5 ng/mlConsistent with acute myocardial  



    infarction.  



   
  



   The diagnostic value of a single normal or non-diagnostic  



   result is questionable.Serial samples at 2-6 hour intervals  



   are required to rule out acute myocardial injury.  



     









 Specimen  Performing Laboratory

 

 Plasma specimen  [facility] DEPARTMENT OF PATHOLOGY AND GENOMIC MEDICINE







   [address] [place] [address]



Lactic acid level (2017  7:27 PM)





 Component  Value  Ref Range

 

 Lactic acid  2.0  0.5 - 2.2 mmol/L









 Specimen  Performing Laboratory

 

 Plasma specimen  [facility] DEPARTMENT OF PATHOLOGY AND GENOMIC MEDICINE







   [address] [place] [address]



after 2017



Insurance







 Payer  Benefit Plan / Group  Subscriber ID  Type  Phone  Address

 

 MEDICARE  MEDICARE PART A AND B  xxxxxxxxxx  Medicare [place], [place]

 

 AETNA  AETNA HMO,POS,EPO, MC/EC  xxxxxx  HMO    









 Guarantor Name  Account Type  Relation to  Date of Birth  Phone  Billing



     Patient      Address

 

 VALARIE MILIAN  Personal/Family  Self  1935  Home:  [address]







         [phone]  [address] [address]

## 2018-09-11 ENCOUNTER — HOSPITAL ENCOUNTER (INPATIENT)
Dept: HOSPITAL 97 - ER | Age: 83
LOS: 3 days | Discharge: HOME HEALTH | DRG: 291 | End: 2018-09-14
Attending: FAMILY MEDICINE | Admitting: FAMILY MEDICINE
Payer: COMMERCIAL

## 2018-09-11 VITALS — BODY MASS INDEX: 41.8 KG/M2

## 2018-09-11 DIAGNOSIS — F41.8: ICD-10-CM

## 2018-09-11 DIAGNOSIS — N18.3: ICD-10-CM

## 2018-09-11 DIAGNOSIS — Z79.01: ICD-10-CM

## 2018-09-11 DIAGNOSIS — I13.0: Primary | ICD-10-CM

## 2018-09-11 DIAGNOSIS — E66.01: ICD-10-CM

## 2018-09-11 DIAGNOSIS — I48.91: ICD-10-CM

## 2018-09-11 DIAGNOSIS — I27.20: ICD-10-CM

## 2018-09-11 DIAGNOSIS — I50.23: ICD-10-CM

## 2018-09-11 DIAGNOSIS — E78.2: ICD-10-CM

## 2018-09-11 DIAGNOSIS — I35.0: ICD-10-CM

## 2018-09-11 DIAGNOSIS — E11.22: ICD-10-CM

## 2018-09-11 DIAGNOSIS — Z86.718: ICD-10-CM

## 2018-09-11 DIAGNOSIS — Z95.0: ICD-10-CM

## 2018-09-11 DIAGNOSIS — Z85.118: ICD-10-CM

## 2018-09-11 DIAGNOSIS — N17.9: ICD-10-CM

## 2018-09-11 LAB
ALBUMIN SERPL BCP-MCNC: 3.1 G/DL (ref 3.4–5)
ALP SERPL-CCNC: 126 U/L (ref 45–117)
ALT SERPL W P-5'-P-CCNC: 20 U/L (ref 12–78)
AST SERPL W P-5'-P-CCNC: 20 U/L (ref 15–37)
BUN BLD-MCNC: 44 MG/DL (ref 7–18)
CKMB CREATINE KINASE MB: 3.3 NG/ML (ref 0.3–3.6)
GLUCOSE SERPLBLD-MCNC: 120 MG/DL (ref 74–106)
HCT VFR BLD CALC: 39.8 % (ref 36–45)
INR BLD: 1.77
LYMPHOCYTES # SPEC AUTO: 0.9 K/UL (ref 0.7–4.9)
MAGNESIUM SERPL-MCNC: 2.2 MG/DL (ref 1.8–2.4)
MCH RBC QN AUTO: 25.9 PG (ref 27–35)
MCV RBC: 82.3 FL (ref 80–100)
PMV BLD: 8.6 FL (ref 7.6–11.3)
POTASSIUM SERPL-SCNC: 4.5 MMOL/L (ref 3.5–5.1)
RBC # BLD: 4.83 M/UL (ref 3.86–4.86)
TROPONIN (EMERG DEPT USE ONLY): 0.04 NG/ML (ref 0–0.04)

## 2018-09-11 PROCEDURE — 51702 INSERT TEMP BLADDER CATH: CPT

## 2018-09-11 PROCEDURE — 71045 X-RAY EXAM CHEST 1 VIEW: CPT

## 2018-09-11 PROCEDURE — 85610 PROTHROMBIN TIME: CPT

## 2018-09-11 PROCEDURE — 85025 COMPLETE CBC W/AUTO DIFF WBC: CPT

## 2018-09-11 PROCEDURE — 83735 ASSAY OF MAGNESIUM: CPT

## 2018-09-11 PROCEDURE — 94760 N-INVAS EAR/PLS OXIMETRY 1: CPT

## 2018-09-11 PROCEDURE — 87040 BLOOD CULTURE FOR BACTERIA: CPT

## 2018-09-11 PROCEDURE — 84484 ASSAY OF TROPONIN QUANT: CPT

## 2018-09-11 PROCEDURE — 80053 COMPREHEN METABOLIC PANEL: CPT

## 2018-09-11 PROCEDURE — 81003 URINALYSIS AUTO W/O SCOPE: CPT

## 2018-09-11 PROCEDURE — 96375 TX/PRO/DX INJ NEW DRUG ADDON: CPT

## 2018-09-11 PROCEDURE — 82962 GLUCOSE BLOOD TEST: CPT

## 2018-09-11 PROCEDURE — 82550 ASSAY OF CK (CPK): CPT

## 2018-09-11 PROCEDURE — 96365 THER/PROPH/DIAG IV INF INIT: CPT

## 2018-09-11 PROCEDURE — 82553 CREATINE MB FRACTION: CPT

## 2018-09-11 PROCEDURE — 93306 TTE W/DOPPLER COMPLETE: CPT

## 2018-09-11 PROCEDURE — 85730 THROMBOPLASTIN TIME PARTIAL: CPT

## 2018-09-11 PROCEDURE — 99285 EMERGENCY DEPT VISIT HI MDM: CPT

## 2018-09-11 PROCEDURE — 36415 COLL VENOUS BLD VENIPUNCTURE: CPT

## 2018-09-11 PROCEDURE — 80048 BASIC METABOLIC PNL TOTAL CA: CPT

## 2018-09-11 PROCEDURE — 93005 ELECTROCARDIOGRAM TRACING: CPT

## 2018-09-11 PROCEDURE — 83605 ASSAY OF LACTIC ACID: CPT

## 2018-09-11 PROCEDURE — 83880 ASSAY OF NATRIURETIC PEPTIDE: CPT

## 2018-09-11 PROCEDURE — 80076 HEPATIC FUNCTION PANEL: CPT

## 2018-09-11 PROCEDURE — 84145 PROCALCITONIN (PCT): CPT

## 2018-09-11 RX ADMIN — FUROSEMIDE SCH MG: 10 INJECTION, SOLUTION INTRAVENOUS at 23:10

## 2018-09-11 RX ADMIN — HUMAN INSULIN SCH: 100 INJECTION, SOLUTION SUBCUTANEOUS at 21:00

## 2018-09-11 RX ADMIN — Medication SCH ML: at 23:11

## 2018-09-11 RX ADMIN — CARVEDILOL SCH MG: 6.25 TABLET, FILM COATED ORAL at 23:11

## 2018-09-11 NOTE — XMS REPORT
Continuity of Care Document

 Created on:2017



Patient:VALARIE VALENCIA

Sex:Female

:1935

External Reference #:[id number]





Demographics







 Address  [address] [place] [address]

 

 Phone  [phone]

 

 Preferred Language  Unknown

 

 Marital Status  Unknown

 

 Rastafarian Affiliation  Unknown

 

 Race  Unknown

 

 Ethnic Group  Unknown









Author







 Organization  Interface









Problems







 Problem  Status  Onset  Classification  Date  Comments  Source



     Date    Reported    



             



             

 

 Discharge    2017    [facility]



 Diagnosis:    7        



 Complication of            



 [place] catheter            



             



             

 

 CATHETER PAIN  Active          [facility]



     7        [place]



             



             







Medications







 Medication  Details  Route  Status  Patient  Ordering  Order  Source



         Instructions  Provider  Date  



               



               



               

 

 Acetaminophen  1 tab,    Inactive        [facility]



 300 MG / Codeine  Route: PO,          017  [place]



 Phosphate 30 MG  Drug Form:            



 Oral Tablet  TAB,            



 [Tylenol with  Dosing            



 Codeine #3]  Weight            



   81.818,            



   kg, ONCE,            



   STAT,            



   Start            



   date:            



   17            



   21:22:00            



   CDT, Stop            



   date:            



   17            



   21:22:00            



   CDTNotes:            



   Do not            



   exceed            



   4gm/day of            



   acetaminop            



   hen.            



   (Same as:            



   Tylenol            



   with            



   Codeine #            



   3)            



               



               







Allergies, Adverse Reactions, Alerts







 Substance  Category  Reaction  Severity  Reaction  Status  Date  Comments  
Source



         type    Reported    



                 



                 



                 

 

 Rythmol  Assertion      Drug  Active      



         allergy        [place]



                 



                 



                 







Immunizations







 Immunization  Date Given  Site  Status  Last Updated  Comments  Source



             



             







Results







 Order  Results  Value  Reference  Date  Interpretation  Comments  Source



 Name      Range        



               



               







Vital Signs







 Vital Sign  Value  Date  Comments  Source



         

 

 Heart Rate  61  2017    [facility]



         



         

 

 Respitory Rate  18  2017    [facility]



         



         

 

 Temperature Oral (F)  98.2 F  2017    [facility]



         



         

 

 Systolic (mm Hg)  132  2017    [facility]



         



         

 

 Diastolic (mm Hg)  76  2017    [facility]



         



         

 

 Systolic (mm Hg)  142  2017    [facility]



         



         

 

 Diastolic (mm Hg)  72  2017    [facility]



         



         

 

 Respitory Rate  18  2017    [facility]



         



         

 

 Heart Rate  64  2017    [facility]



         



         

 

 Temperature Oral (F)  98.2 F  2017    [facility]



         



         

 

 Heart Rate  62  2017    [facility]



         



         

 

 Systolic (mm Hg)  152  2017    [facility]



         



         

 

 Diastolic (mm Hg)  68  2017    [facility]



         



         

 

 Respitory Rate  18  2017    [facility]



         



         

 

 BMI Calculated  29.11  2017    [facility]



         



         

 

 Height  167.64 cm  2017    [facility]



         



         

 

 Weight  81.818  2017    [facility]



         



         

 

 Temperature Oral (F)  98.0 F  2017    [facility]



         



         







Encounters







 Location  Location  Encounter  Encounter  Reason  Attending  ADM  DC  Status  
Source



   Details  Type  Number  For  Provider  Date  Date    



         Visit          



                   



                   



                   

 

 [facility]  [phone]    Ganesh      MARILUZ Braxton  /2017    [facility]                  



                   



                   







Procedures







 Procedure  Code  Date  Perfomer  Comments  Source



           



           

 

 Hip arthroplasty  [id number]        [facility]

## 2018-09-11 NOTE — RAD REPORT
EXAM DESCRIPTION:  RAD - Chest Single View - 9/11/2018 2:27 pm

 

CLINICAL HISTORY:  Cough;SOB

Chest pain.

 

COMPARISON:  Chest Pa And Lat (2 Views) dated 5/22/2018; Chest Single View dated 8/25/2017; CHEST SIN
GLE VIEW dated 9/7/2015; CHEST SINGLE VIEW dated 9/6/2015

 

FINDINGS:  Portable technique limits examination quality.

 

Ill-defined right midlung opacity appears similar to comparative study and perhaps mildly reduced in 
size. Significant cardiomegaly is seen. No displaced fractures.Dual lead pacer device is in place.

## 2018-09-11 NOTE — EDPHYS
Physician Documentation                                                                           

 [facility]                                                                

Name: Lianne Milian                                                                               

Age: 83 yrs                                                                                       

Sex: Female                                                                                       

: 1935                                                                                   

MRN: [id number]                                                                                   

Arrival Date: 2018                                                                          

Time: 12:48                                                                                       

Account#: [id number]                                                                            

Bed 5                                                                                             

Private MD:                                                                                       

ED Physician Gideon Pavon                                                                      

HPI:                                                                                              

                                                                                             

15:00 This 83 yrs old  Female presents to ER via EMS with complaints of Shortness Of pm1 

      Breath.                                                                                     

15:00 The patient has shortness of breath at rest. Onset: The symptoms/episode began/occurred pm1 

      3 day(s) ago. Duration: The symptoms are continuous, Worse today. The patient's             

      shortness of breath has no apparent modifying factors. Associated signs and symptoms:       

      Pertinent positives: productive cough, Pertinent negatives: fever, nausea, vomiting.        

      Severity of symptoms: in the emergency department the symptoms are worse Pain is            

      currently a 0 / 10. The patient has experienced similar episodes in the past, a few         

      times. The patient has not recently seen a physician, the patient's primary care            

      provider is Dr. Gay.                                                                       

                                                                                                  

Historical:                                                                                       

- Allergies:                                                                                      

12:55 Amitriptyline;                                                                          aa5 

12:55 propafenone HCl;                                                                        aa5 

12:55 rosiglitazone maleate;                                                                  aa5 

12:55 Rythmol;                                                                                aa5 

12:55 sitagliptin phosphate;                                                                  aa5 

12:55 Tape;                                                                                   aa5 

12:55 Januvia;                                                                                aa5 

- Home Meds:                                                                                      

12:55 Eliquis oral oral [Active]; Lasix 40 mg Oral tab 1 tab once daily [Active];             aa5 

      lansoprazole 30 mg Oral cpDR 1 cap once daily [Active]; alprazolam 0.5 mg Oral tab as       

      needed [Active]; gabapentin 100 mg oral cap [Active]; carvedilol 6.25 mg Oral tab 1 tab     

      nightly [Active]; atorvastatin 40 mg Oral tab 1 tab once daily [Active]; lantus sliding     

      scale per glucometer reading [Active];                                                      

- PMHx:                                                                                           

12:55 Anemia; Anxiety; Cancer, Lung; Diabetes - IDDM; GERD; hital hernia; Hypertension;       aa5 

- PSHx:                                                                                           

12:55 Pacemaker;                                                                              aa5 

                                                                                                  

- Immunization history:: Adult Immunizations up to date, Pneumococcal vaccine is up to            

  date, Flu vaccine is up to date.                                                                

- Ebola Screening: : No symptoms or risks identified at this time.                                

- Social history:: Smoking status: Patient/guardian denies using tobacco, but has a               

  distant history of tobacco abuse.                                                               

                                                                                                  

                                                                                                  

ROS:                                                                                              

15:00 Constitutional: Negative for fever, chills, and weight loss, Eyes: Negative for injury, pm1 

      pain, redness, and discharge, ENT: Negative for injury, pain, and discharge, Neck:          

      Negative for injury, pain, and swelling, Cardiovascular: Negative for chest pain,           

      palpitations, and edema.                                                                    

15:00 Abdomen/GI: Negative for abdominal pain, nausea, vomiting, diarrhea, and constipation,      

      Back: Negative for injury and pain, : Negative for injury, bleeding, discharge, and       

      swelling, MS/Extremity: Negative for injury and deformity, Skin: Negative for injury,       

      rash, and discoloration, Neuro: Negative for headache, weakness, numbness, tingling,        

      and seizure.                                                                                

15:00 Respiratory: Positive for cough, with white sputum, shortness of breath.                    

                                                                                                  

Exam:                                                                                             

15:00 Constitutional:  This is a well developed, well nourished patient who is awake, alert,  pm1 

      and in no acute distress. Head/Face:  Normocephalic, atraumatic. Eyes:  Pupils equal        

      round and reactive to light, extra-ocular motions intact.  Lids and lashes normal.          

      Conjunctiva and sclera are non-icteric and not injected.  Cornea within normal limits.      

      Periorbital areas with no swelling, redness, or edema. ENT:  Nares patent. No nasal         

      discharge, no septal abnormalities noted.  Tympanic membranes are normal and external       

      auditory canals are clear.  Oropharynx with no redness, swelling, or masses, exudates,      

      or evidence of obstruction, uvula midline.  Mucous membranes moist. Neck:  Trachea          

      midline, no thyromegaly or masses palpated, and no cervical lymphadenopathy.  Supple,       

      full range of motion without nuchal rigidity, or vertebral point tenderness.  No            

      Meningismus. Chest/axilla:  Normal chest wall appearance and motion.  Nontender with no     

      deformity.  No lesions are appreciated. Cardiovascular:  Regular rate and rhythm with a     

      normal S1 and S2.  No gallops, murmurs, or rubs.  Normal PMI, no JVD.  No pulse             

      deficits.                                                                                   

15:00 Abdomen/GI:  Soft, non-tender, with normal bowel sounds.  No distension or tympany.  No     

      guarding or rebound.  No evidence of tenderness throughout. Back:  No spinal                

      tenderness.  No costovertebral tenderness.  Full range of motion. Skin:  Warm, dry with     

      normal turgor.  Normal color with no rashes, no lesions, and no evidence of cellulitis.     

      MS/ Extremity:  Pulses equal, no cyanosis.  Neurovascular intact.  Full, normal range       

      of motion.                                                                                  

15:00 Respiratory: the patient does not display signs of respiratory distress, Breath sounds:     

      rales, that are mild, are heard diffusely.                                                  

15:00 Neuro: Orientation: is normal, appropriate for stated age, Motor: moves all fours,          

      Sensation: is normal, no obvious gross deficits.                                            

                                                                                                  

Vital Signs:                                                                                      

12:50  / 79; Pulse 72; Resp 30 S; Temp 97.5(O); Pulse Ox 98% on Non-rebreather mask;    aa5 

13:50  / 70; Pulse 70; Resp 26 S; Pulse Ox 94% on 4 lpm NC;                             aa5 

14:45  / 73; Pulse 70; Resp 24 S; Pulse Ox 96% on 4 lpm NC;                             aa5 

14:52  / 68; Pulse 70; Resp 24 S; Pulse Ox 96% on 4 lpm NC;                             aa5 

15:30  / 92; Pulse 70; Resp 22 S; Pulse Ox 98% on 4 lpm NC;                             aa5 

16:15  / 63; Pulse 70; Resp 22 S; Pulse Ox 97% on 4 lpm NC;                             aa5 

17:00  / 59; Pulse 70; Resp 20 S; Pulse Ox 98% on 4 lpm NC;                             aa5 

20:00  / 53; Pulse 70; Resp 20; Pulse Ox 98% on 4 lpm NC;                               jb4 

20:45  / 53; Pulse 70; Resp 18; Pulse Ox 99% on 4 lpm NC;                               jb4 

                                                                                                  

MDM:                                                                                              

12:56 Patient medically screened.                                                             pm1 

15:38 Data reviewed: vital signs. Data interpreted: Pulse oximetry: on 2L(s) per nasal        pm1 

      canula, is 96 %. Interpretation: normal.                                                    

15:38 Counseling: I had a detailed discussion with the patient and/or guardian regarding: the pm1 

      historical points, exam findings, and any diagnostic results supporting the                 

      discharge/admit diagnosis, lab results, radiology results.                                  

16:55 Physician consultation: Evelyn Sneed MD was called at 16:50, was contacted at 16:55,    pm1 

      regarding admission, and will see patient.                                                  

                                                                                                  

                                                                                             

12:56 Order name: Basic Metabolic Panel; Complete Time: 14:14                                 pm1 

                                                                                             

12:56 Order name: CBC with Diff; Complete Time: 14:14                                         pm1 

                                                                                             

12:56 Order name: Ckmb; Complete Time: 14:14                                                  pm1 

                                                                                             

12:56 Order name: CPK; Complete Time: 14:14                                                   pm1 

                                                                                             

12:56 Order name: LFT's; Complete Time: 14:14                                                 pm1 

                                                                                             

12:56 Order name: Magnesium; Complete Time: 14:14                                             pm1 

                                                                                             

12:56 Order name: NT PRO-BNP; Complete Time: 14:14                                            pm1 

                                                                                             

12:56 Order name: PT-INR; Complete Time: 14:14                                                pm1 

                                                                                             

12:56 Order name: Ptt, Activated; Complete Time: 14:14                                        pm1 

                                                                                             

12:56 Order name: Troponin (emerg Dept Use Only); Complete Time: 14:14                        pm1 

                                                                                             

12:57 Order name: Blood Culture Adult (2)                                                     pm1 

                                                                                             

12:57 Order name: Procalcitonin; Complete Time: 14:14                                         pm1 

                                                                                             

12:57 Order name: Lactate; Complete Time: 14:14                                               pm1 

                                                                                             

15:02 Order name: Urine Microscopic Only                                                      bd  

                                                                                             

12:56 Order name: XRAY Chest (1 view); Complete Time: 18:12                                   pm1 

                                                                                             

12:56 Order name: EKG; Complete Time: 12:57                                                   pm1 

                                                                                             

15:19 Order name: Urine Dipstick--Ancillary (enter results); Complete Time: 15:29             bd  

                                                                                             

17:19 Order name: Echo with Doppler                                                           EDMS

                                                                                             

17:19 Order name: CBC with Automated Diff                                                     EDMS

                                                                                             

17:19 Order name: CBC with Automated Diff                                                     EDMS

                                                                                             

17:19 Order name: CBC with Automated Diff                                                     EDMS

                                                                                             

17:19 Order name: CBC with Automated Diff                                                     EDMS

                                                                                             

17:19 Order name: Comprehensive Metabolic Panel                                               EDMS

                                                                                             

17:19 Order name: Comprehensive Metabolic Panel                                               EDMS

                                                                                             

17:19 Order name: Comprehensive Metabolic Panel                                               EDMS

                                                                                             

17:19 Order name: Comprehensive Metabolic Panel                                               EDMS

                                                                                             

12:56 Order name: Cardiac monitoring; Complete Time: 13:03                                    pm1 

                                                                                             

12:56 Order name: EKG - Nurse/Tech; Complete Time: 13:03                                      pm1 

                                                                                             

12:56 Order name: IV Saline Lock; Complete Time: 13:04                                        pm1 

                                                                                             

12:56 Order name: Labs collected and sent; Complete Time: 13:04                               pm1 

                                                                                             

12:56 Order name: O2 Per Protocol; Complete Time: 13:03                                       pm1 

                                                                                             

12:56 Order name: O2 Sat Monitoring; Complete Time: 13:03                                     pm1 

                                                                                             

12:56 Order name: Urine Dipstick-Ancillary (obtain specimen); Complete Time: 15:29            pm                                                                                             

15:06 Order name: Cabrera: VO received at 1440; Complete Time: 15:06                            [facility] 

                                                                                             

17:08 Order name: Cabrera: D/C Cabrera; Complete Time: 17:34                                      [facility] 

                                                                                             

17:19 Order name: CONS Physician Consult                                                      EDMS

                                                                                             

17:19 Order name: Consistent Carb (ADA) 1800 Bob                                              EDMS

                                                                                                  

Administered Medications:                                                                         

13:08 Drug: Albuterol 2.5 mg Route: Inhalation;                                               aa5 

21:29 Follow up: Response: No adverse reaction                                                jb4 

13:08 Drug: AtroVENT Aerosol 0.5 mg Route: Inhalation;                                        aa5 

21:29 Follow up: Response: No adverse reaction                                                jb4 

14:46 Drug: Lasix 40 mg Route: IVP; Site: left antecubital;                                   aa5 

14:55 Follow up: Response: No adverse reaction                                                aa5 

17:25 Drug: Rocephin 1 grams Route: IV; Rate: calculated rate; Site: left antecubital;        aa5 

17:35 Follow up: Response: No adverse reaction                                                aa5 

17:55 Follow up: Response: No adverse reaction; IV Status: Completed infusion; IV Intake:     jb4 

      10ml ; Given IVP per pharmacy protocol.                                                     

                                                                                                  

                                                                                                  

Point of Care Testing:                                                                            

      Blood Glucose:                                                                              

13:15 Blood Glucose: 120 mg/dL;                                                               aa5 

      Ranges:                                                                                     

      Critical Glucose Levels:Adult <50 mg/dl or >400 mg/dl  <40 mg/dl or >180 mg/dl       

Disposition:                                                                                      

                                                                                             

06:39 Co-signature as Attending Physician, Gideon WALKER I agree with the assessment and  [place] 

      plan of care.                                                                               

                                                                                                  

Disposition:                                                                                      

18 15:41 Hospitalization ordered by Evelyn Sneed for Observation. Preliminary diagnosis    

  is Combined systolic (congestive) and diastolic (congestive) heart failure.                     

- Bed requested for Telemetry/MedSurg (observation).                                              

- Status is Observation.                                                                      jb4 

- Condition is Stable.                                                                            

- Problem is new.                                                                                 

- Symptoms have improved.                                                                         

UTI on Admission? No                                                                              

                                                                                                  

                                                                                                  

                                                                                                  

Signatures:                                                                                       

Dispatcher [employer]                           EDMS                                                 

Gideon Pavon MD MD cha Calderon, Audri, RN                     RN   aa5                                                  

Vita Louise, RN                       RN                                                      

Jovanni Woo NP                    NP   pm1                                                  

Heri Low, NAYELY                       RN   jb4                                                  

                                                                                                  

Corrections: (The following items were deleted from the chart)                                    

                                                                                             

21:18 15:41 Hospitalization Ordered by Evelyn Sneed MD for Observation. Preliminary diagnosis cg  

      is Combined systolic (congestive) and diastolic (congestive) heart failure. Bed             

      requested for Telemetry/MedSurg (observation). Status is Observation. Condition is          

      Stable. Problem is new. Symptoms have improved. UTI on Admission? No. pm1                   

22:17 21:18 2018 15:41 Hospitalization Ordered by Evelyn Sneed MD for Observation.      jb4 

      Preliminary diagnosis is Combined systolic (congestive) and diastolic (congestive)          

      heart failure. Bed requested for Telemetry/MedSurg (observation). Status is                 

      Observation. Condition is Stable. Problem is new. Symptoms have improved. UTI on            

      Admission? No. cg                                                                           

                                                                                                  

**************************************************************************************************

## 2018-09-11 NOTE — XMS REPORT
Summary of Care: 17 - 17

 Created on:May 17, 2095



Patient:VALARIE VALENCIA

Sex:Female

:1935

External Reference #:[id number]





Demographics







 Address  [address] [place] [address]-

 

 Home Phone  [phone]

 

 Preferred Language  English

 

 Marital Status  

 

 Restoration Affiliation  Hoahaoism

 

 Race  White/

 

 Ethnic Group  Not  or 









Author







 Organization  [facility]

 

 Address  [address] [place] [address]-

 

 Phone  [phone]









Encounter

HQ Rasta(FIN) [id number] Date(s): 17 - 17

[facility] [id number] [place] [id number]     
 

Discharge Diagnosis: Complication of Cabrera catheter

Discharge Disposition: Home or Self Care

Attending Physician: Ganesh Rod MD





Vital Signs







 Most recent to oldest  1  2  3



 [Reference Range]:      

 

 Height  167.64 cm    



   (17 12:40 PM)    

 

 Temperature Oral [96.4-99.1  98.2 DegF  98.2 DegF  98.0 DegF



 DegF]  (17 10:32 PM)  (17 4:14 PM)  (17 12:40 PM)

 

 Blood Pressure [/60-90  132/76 mmHg  142/72 mmHg  152/68 mmHg



 mmHg]  (17 10:32 PM)  *HI*  *HI*



     (17 4:14 PM)  (17 2:12 PM)

 

 Respiratory Rate [14-20  18 BRMIN  18 BRMIN  18 BRMIN



 BRMIN]  (17 10:32 PM)  (17 4:14 PM)  (17 2:12 PM)

 

 Peripheral Pulse Rate [  61 bpm  64 bpm  62 bpm



 bpm]  (17 10:32 PM)  (17 4:14 PM)  (17 2:12 PM)

 

 Weight  81.818 kg    



   (17 12:40 PM)    

 

 Body Mass Index  29.11 m2    



   (17 12:40 PM)    







Problem List

No data available for this section



Allergies, Adverse Reactions, Alerts







 Substance  Reaction  Severity  Status

 

 Rythmol      Active







Medications

Tylenol with Codeine #3 oral tablet

1 tab, Route: PO, Drug Form: TAB, Dosing Weight 81.818, kg, ONCE, STAT, Start 
date: 17 21:22:00 CDT, Stop date: 17 21:22:00 CDT

Notes: Do not exceed 4gm/day of acetaminophen.  (Same as: Tylenol with Codeine 
# 3)

Start Date: 17

Stop Date: 17

Status: Completed



Results

No data available for this section



Immunizations

No data available for this section



Procedures







 Procedure  Date  Related Diagnosis  Body Site

 

 Hip arthroplasty      







Social History







 Social History Type  Response

 

 Smoking Status  Never smoker; Exposure to Tobacco Smoke None; Cigarette Smoking



   Last 365 Days No; Reg Smoking Cessation Counseling No







Assessment and Plan

No data available for this section

## 2018-09-11 NOTE — ER
Nurse's Notes                                                                                     

 [facility]                                                                

Name: Lianne Milian                                                                               

Age: 83 yrs                                                                                       

Sex: Female                                                                                       

: 1935                                                                                   

MRN: [id number]                                                                                   

Arrival Date: 2018                                                                          

Time: 12:48                                                                                       

Account#: [id number]                                                                            

Bed 5                                                                                             

Private MD:                                                                                       

Diagnosis: Combined systolic (congestive) and diastolic (congestive) heart failure                

                                                                                                  

Presentation:                                                                                     

                                                                                             

12:48 Presenting complaint: EMS states: productive cough and SOB x 3 days ago. EMS reports pt aa5 

      was 88% O2 sat via 2 L NC upon scene arrival and increased to 96% via non-rebreather.       

12:48 Transition of care: patient was not received from another setting of care. Onset of     aa5 

      symptoms was 2018. Risk Assessment: Do you want to hurt yourself or someone       

      else? Patient reports no desire to harm self or others. Care prior to arrival: IV           

      initiated. 20 GA, in the left antecubital area, Glucose check: 172 Oxygen administered.     

      via nasal cannula.                                                                          

12:48 Method Of Arrival: EMS: [facility]                                                aa5 

12:48 Acuity: LASHAUN 2                                                                           aa5 

12:48 Initial Sepsis Screen: Does the patient meet any 2 criteria? RR > 20 per min. Yes Does  aa5 

      the patient have a suspected source of infection? Yes: Productive cough/pneumonia.          

                                                                                                  

Triage Assessment:                                                                                

13:00 Respiratory: Onset: The symptoms/episode began/occurred 3 days ago , the patient has    aa5 

      moderate shortness of breath.                                                               

                                                                                                  

Historical:                                                                                       

- Allergies:                                                                                      

12:55 Amitriptyline;                                                                          aa5 

12:55 propafenone HCl;                                                                        aa5 

12:55 rosiglitazone maleate;                                                                  aa5 

12:55 Rythmol;                                                                                aa5 

12:55 sitagliptin phosphate;                                                                  aa5 

12:55 Tape;                                                                                   aa5 

12:55 Januvia;                                                                                aa5 

- Home Meds:                                                                                      

12:55 Eliquis oral oral [Active]; Lasix 40 mg Oral tab 1 tab once daily [Active];             aa5 

      lansoprazole 30 mg Oral cpDR 1 cap once daily [Active]; alprazolam 0.5 mg Oral tab as       

      needed [Active]; gabapentin 100 mg oral cap [Active]; carvedilol 6.25 mg Oral tab 1 tab     

      nightly [Active]; atorvastatin 40 mg Oral tab 1 tab once daily [Active]; lantus sliding     

      scale per glucometer reading [Active];                                                      

- PMHx:                                                                                           

12:55 Anemia; Anxiety; Cancer, Lung; Diabetes - IDDM; GERD; hital hernia; Hypertension;       aa5 

- PSHx:                                                                                           

12:55 Pacemaker;                                                                              aa5 

                                                                                                  

- Immunization history:: Adult Immunizations up to date, Pneumococcal vaccine is up to            

  date, Flu vaccine is up to date.                                                                

- Ebola Screening: : No symptoms or risks identified at this time.                                

- Social history:: Smoking status: Patient/guardian denies using tobacco, but has a               

  distant history of tobacco abuse.                                                               

                                                                                                  

                                                                                                  

Screenin:00 Abuse screen: Denies threats or abuse. Nutritional screening: No deficits noted.        aa5 

      Tuberculosis screening: No symptoms or risk factors identified. Fall Risk Fall in past      

      12 months (25 points). IV access (20 points). Total Kaufman Fall Scale indicates High         

      Risk Score (45 or more points). Fall prevention measures have been instituted. Side         

      Rails Up X 2 Placed Close to Nursing Station.                                               

                                                                                                  

Assessment:                                                                                       

13:00 General: Appears distressed, uncomfortable, Behavior is calm, cooperative. Pain: Denies aa5 

      pain. Neuro: Level of Consciousness is awake, alert, obeys commands, Oriented to            

      person, place, time, situation. Cardiovascular: Heart tones S1 S2 present Rhythm is         

      paced. Respiratory: Reports shortness of breath cough that is productive, Airway is         

      patent Respiratory effort is labored, Respiratory pattern is tachypnea Breath sounds        

      with crackles bilaterally. Breath sounds with wheezes bilaterally. GI: Abdomen is           

      obese, Bowel sounds present X 4 quads. Abd is soft and non tender X 4 quads. Patient        

      currently denies nausea, vomiting. : No signs and/or symptoms were reported regarding     

      the genitourinary system. EENT: No signs and/or symptoms were reported regarding the        

      EENT system. Derm: Skin is pink, warm \T\ dry. bruising that is petechiae noted to ry      

      shins. Musculoskeletal: Range of motion: intact in all extremities.                         

13:50 Reassessment: Patient states feeling better. General: Appears comfortable. Neuro: Level aa5 

      of Consciousness is awake, alert, obeys commands, Oriented to person, place, time,          

      situation. Cardiovascular: Rhythm is paced. Respiratory: Airway is patent Respiratory       

      effort is even, unlabored, Respiratory pattern is tachypnea Breath sounds with crackles     

      bilaterally. Breath sounds with wheezes bilaterally. Derm: Skin is pink, warm \T\ dry.      

14:45 Reassessment: Patient and/or family updated on plan of care and expected duration. Pain aa5 

      level reassessed. Patient is alert, oriented x 3, equal unlabored respirations, skin        

      warm/dry/pink. Patient denies pain at this time. Patient states feeling better.             

15:30 Reassessment: Patient and/or family updated on plan of care and expected duration. Pain aa5 

      level reassessed. Patient is alert, oriented x 3, equal unlabored respirations, skin        

      warm/dry/pink. Patient denies pain at this time. Family at bedside conversating with        

      patient.                                                                                    

16:30 Reassessment: Patient and/or family updated on plan of care and expected duration. Pain aa5 

      level reassessed. Patient is alert, oriented x 3, equal unlabored respirations, skin        

      warm/dry/pink. Patient denies pain at this time. Cardiovascular: Rhythm is Paced.           

17:25 Reassessment: Pt resting in bed with eyes closed, respirations are even and unlabored,  aa5 

      skin is pink/warm/dry. Bed remains in low position, side rails x 2, call bell within        

      reach.                                                                                      

19:30 General: Appears in no apparent distress. comfortable, Behavior is calm, cooperative.   jb4 

      Pain: Denies pain. Neuro: Level of Consciousness is awake, alert, obeys commands,           

      Oriented to person, place, time, situation. Cardiovascular: Heart tones S1 S2 present       

      Rhythm is Respiratory: Airway is patent Respiratory effort is even, unlabored,              

      Respiratory pattern is regular, symmetrical, Breath sounds are clear in right posterior     

      upper lobe and right posterior middle lobe Breath sounds with crackles in right upper       

      lobe, left upper lobe, right middle lobe, left lower lobe, right lower lobe, left           

      posterior upper lobe, left posterior lower lobe and right posterior lower lobe. GI: No      

      signs and/or symptoms were reported involving the gastrointestinal system. : No signs     

      and/or symptoms were reported regarding the genitourinary system. EENT: No signs and/or     

      symptoms were reported regarding the EENT system. Derm: Skin is intact, Skin is pink,       

      warm \T\ dry. Musculoskeletal: Range of motion: intact in all extremities.                  

20:45 Reassessment: Patient appears in no apparent distress at this time. Patient and/or      jb4 

      family updated on plan of care and expected duration. Pain level reassessed. Patient is     

      alert, oriented x 3, equal unlabored respirations, skin warm/dry/pink. Patient denies       

      pain at this time.                                                                          

21:39 Reassessment: Attempted to call report. Charge Nurse states unaware of pt admission at  jb4 

      this time and states will call back.                                                        

22:10 Reassessment: Patient appears in no apparent distress at this time. Patient is alert,   jb4 

      oriented x 3, equal unlabored respirations, skin warm/dry/pink. Patient denies pain at      

      this time.                                                                                  

                                                                                                  

Vital Signs:                                                                                      

12:50  / 79; Pulse 72; Resp 30 S; Temp 97.5(O); Pulse Ox 98% on Non-rebreather mask;    aa5 

13:50  / 70; Pulse 70; Resp 26 S; Pulse Ox 94% on 4 lpm NC;                             aa5 

14:45  / 73; Pulse 70; Resp 24 S; Pulse Ox 96% on 4 lpm NC;                             aa5 

14:52  / 68; Pulse 70; Resp 24 S; Pulse Ox 96% on 4 lpm NC;                             aa5 

15:30  / 92; Pulse 70; Resp 22 S; Pulse Ox 98% on 4 lpm NC;                             aa5 

16:15  / 63; Pulse 70; Resp 22 S; Pulse Ox 97% on 4 lpm NC;                             aa5 

17:00  / 59; Pulse 70; Resp 20 S; Pulse Ox 98% on 4 lpm NC;                             aa5 

20:00  / 53; Pulse 70; Resp 20; Pulse Ox 98% on 4 lpm NC;                               jb4 

20:45  / 53; Pulse 70; Resp 18; Pulse Ox 99% on 4 lpm NC;                               jb4 

                                                                                                  

ED Course:                                                                                        

12:45 Initial lab(s) drawn, by me, sent to lab. First set of blood cultures drawn by me.      jb1 

12:48 Patient arrived in ED.                                                                  aa5 

12:48 Arm band placed on Patient placed in an exam room, on a stretcher.                      aa5 

12:50 Patient has correct armband on for positive identification. Placed in gown. Bed in low  aa5 

      position. Call light in reach. Side rails up X2.                                            

12:50 Cardiac monitor on. Pulse ox on. NIBP on.                                               aa5 

12:51 Jovanni Woo NP is PHCP.                                                           pm1 

12:51 Gideon Pavon MD is Attending Physician.                                             pm1 

12:54 Chichi Schmidt RN is Primary Nurse.                                                   aa5 

12:56 EKG done, by EKG tech. reviewed by Jovanni Woo NP.                                  sm3 

12:58 Triage completed.                                                                       aa5 

13:00 Second set of blood cultures drawn by me.                                               jb1 

14:27 X-ray completed. Portable x-ray completed in exam room.                                 jr1 

14:27 XRAY Chest (1 view) In Process Unspecified.                                             EDMS

14:36 No provider procedures requiring assistance completed.                                  aa5 

14:43 Cabrera cath inserted, using sterile technique, 18 Fr., by me, balloon inflated, to       aa5 

      gravity drainage, urine specimen collected.                                                 

15:41 Evelyn Sneed MD is Hospitalizing Provider.                                            pm1 

17:25 Cabrera cath removed intact, balloon deflated.                                            aa5 

19:04 Report given to NAYELY Garcia and NAYELY Clark.                                                aa5 

21:32 Maintain EMS IV. Dressing intact. Good blood return noted. Site clean \T\ dry. Gauge \T\    leonid
4

      site: 18g LAC.                                                                              

22:10 Patient admitted, IV remains in place.                                                  jb4 

                                                                                                  

Administered Medications:                                                                         

13:08 Drug: Albuterol 2.5 mg Route: Inhalation;                                               aa5 

21:29 Follow up: Response: No adverse reaction                                                jb4 

13:08 Drug: AtroVENT Aerosol 0.5 mg Route: Inhalation;                                        aa5 

21:29 Follow up: Response: No adverse reaction                                                jb4 

14:46 Drug: Lasix 40 mg Route: IVP; Site: left antecubital;                                   aa5 

14:55 Follow up: Response: No adverse reaction                                                aa5 

17:25 Drug: Rocephin 1 grams Route: IV; Rate: calculated rate; Site: left antecubital;        aa5 

17:35 Follow up: Response: No adverse reaction                                                aa5 

17:55 Follow up: Response: No adverse reaction; IV Status: Completed infusion; IV Intake:     jb4 

      10ml ; Given IVP per pharmacy protocol.                                                     

                                                                                                  

                                                                                                  

Point of Care Testing:                                                                            

      Blood Glucose:                                                                              

13:15 Blood Glucose: 120 mg/dL;                                                               aa5 

      Ranges:                                                                                     

                                                                                                  

Intake:                                                                                           

17:55 IV: 10ml; Total: 10ml.                                                                  jb4 

                                                                                                  

Outcome:                                                                                          

15:41 Decision to Hospitalize by Provider.                                                    pm1 

22:10 Admitted to Med/surg accompanied by tech, via wheelchair, room 204, with oxygen, with   jb4 

      chart, Report called to  Maeve Meyers RN                                                 

22:10 Condition: stable                                                                       jb4 

22:10 Instructed on the need for admit, Demonstrated understanding of instructions.               

22:17 Patient left the ED.                                                                    jb4 

                                                                                                  

Signatures:                                                                                       

Dispatcher [employer]                           EDMS                                                 

Reggie Luna                                 jb1                                                  

Petty Peter jr1                                                  

Chichi Schmidt, RN                     RN   aa5                                                  

Jovanni Woo, NP                    NP   pm1                                                  

Heri Low RN                       RN   jb4                                                  

Rosa Barron                              3                                                  

                                                                                                  

Corrections: (The following items were deleted from the chart)                                    

14:29 13:50 Pulse 70bpm; Resp 26bpm; Spontaneous; Pulse Ox 94% 4 lpm Nasal Cannula; aa5       aa5 

                                                                                                  

**************************************************************************************************

## 2018-09-11 NOTE — EKG
Test Date:    2018-09-11               Test Time:    12:51:32

Technician:   PATRICIA                                     

                                                     

MEASUREMENT RESULTS:                                       

Intervals:                                           

Rate:         70                                     

FL:                                                  

QRSD:         196                                    

QT:           462                                    

QTc:          498                                    

Axis:                                                

P:                                                   

FL:                                                  

QRS:          -65                                    

T:            112                                    

                                                     

INTERPRETIVE STATEMENTS:                                       

                                                     

VENTRICULAR PACED RHYTHM

Left axis deviation

Left ventricular hypertrophy with QRS widening and repolarization abnormality

Lateral infarct, age undetermined

Inferior infarct, age undetermined

Abnormal ECG

Compared to ECG 05/22/2018 13:30:01

[facility]



Electronically Signed On 09-11-18 16:06:44 CDT by Ronald Davila

## 2018-09-11 NOTE — XMS REPORT
Clinical Summary

 Created on:2018



Patient:Valarie Milian

Sex:Female

:1935

External Reference #:[id number]





Demographics







 Address  [address]



   [place] [address]

 

 Home Phone  [phone]

 

 Home Phone  [phone]

 

 Email Address  [email or web address]

 

 Preferred Language  English

 

 Marital Status  

 

 Adventist Affiliation  Unknown

 

 Race  White

 

 Ethnic Group  Not  or 









Author







 Organization  [facility]

 

 Address  [address] [place] [address]









Support







 Name  Relationship  Address  Phone

 

 Nayely Milian  Child  [address]  [phone]



     [place] [address]  









Care Team Providers







 Name  Role  Phone

 

 Terry Gay MD  Primary Care Provider  [phone]









Allergies







 Active Allergy  Reactions  Severity  Noted Date  Comments

 

 Propafenone      2016  







Current Medications







 Prescription  Sig.  Disp.  Refills  Start Date  End Date  Status

 

 metFORMIN (GLUCOPHAGE)  Take 500 mg by          Active



 500 MG tablet  mouth 2 (two)          



   times a day with          



   meals.          

 

 lisinopril  Take 10 mg by          Active



 (PRINIVIL,ZESTRIL) 10  mouth daily.          



 MG tablet            

 

 apixaban (ELIQUIS) 5  Take 2.5 mg by          Active



 mg tablet  mouth 2 (two)          



   times a day.          

 

 sertraline (ZOLOFT) 50  Take 50 mg by          Active



 MG tablet  mouth daily.          

 

 atorvastatin (LIPITOR)  Take 40 mg by          Active



 40 MG tablet  mouth daily with          



   dinner.          

 

 lansoprazole  Take 30 mg by          Active



 (PREVACID) 30 MG  mouth daily          



 capsule  before          



   breakfast.          

 

 diphenhydrAMINE  Take 25 mg by          Active



 (BENADRYL) 25 mg  mouth nightly as          



 tablet  needed for          



   sleep.          

 

 cyanocobalamin 1000  Take 1,000 mcg          Active



 MCG tablet  by mouth daily.          

 

 metoprolol succinate  Take 1 tablet  30 tablet  0  2017  10/05/2017  




 XL (TOPROL-XL) 50 mg  (50 mg total) by          



 24 hr tablet  mouth daily for          



   30 days.          

 

 insulin GLARGINE  Inject 25 Units  7.5 mL  0  2017  10/05/2017  



 (LANTUS) 100 unit/mL  under the skin          



 injection (vial)  nightly for 30          



   days.          

 

 insulin lispro  Inject 8 Units  10 mL  12  2017  10/05/2017  



 (HumaLOG) 100 unit/mL  under the skin 3          



 injection  (three) times a          



   day with meals          



   for 30 days.          

 

 furosemide (LASIX) 20  Take 1 tablet  30 tablet  0  2017  10/05/2017  




 mg tablet  (20 mg total) by          



   mouth daily for          



   30 days.          

 

 HYDROcodone-acetaminop  Take 1 tablet by      2017  



 hen (NORCO)  mg  mouth every 6          



 per tablet  (six) hours as          



   needed for          



   moderate pain or          



   severe pain for          



   up to 14 days.          



   Max Daily          



   Amount: 4          



   tablets          

 

 aspirin 81 mg chewable  Chew 1 tablet  30 tablet  0  2017  10/05/2017  




 tablet  (81 mg total)          



   daily for 30          



   days.          

 

 nystatin (MYCOSTATIN)  Apply topically  15 g    2017  10/05/2017  



 100,000 unit/gram  2 (two) times a          



 powder  day for 30 days.          

 

 isosorbide mononitrate  Take 1 tablet  30 tablet  0  2017  10/05/2017  




 (IMDUR) 30 MG 24 hr  (30 mg total) by          



 tablet  mouth daily for          



   30 days.          







Active Problems







 Problem  Noted Date

 

 Intertrochanteric fracture of left femur  2017

 

 Systolic heart failure, chronic  2017

 

 Acute kidney injury superimposed on CKD  2017

 

 Type 2 diabetes mellitus with hyperglycemia  2017

 

 Hip fracture requiring operative repair  2017







Encounters







 Date  Type  Specialty  Care Team  Description

 

 10/13/2017  Telephone  Orthopedic Surgery  Woo Bee MD  

 

 2017  Office Visit  Orthopedic Surgery  Woo Bee,  Left hip pain (
Primary Dx);



       MD  Intertrochanteric fracture of left femur, closed, with routine 
healing, subsequent encounter

 

 2017  Orders Only  Orthopedic Surgery  Woo Bee,  Intertrochanteric 
fracture



       MD  of left femur, closed, with



         routine healing, subsequent



         encounter (Primary Dx)

 

 2017  Abstract  Orthopedic Surgery  Woo Bee MD  

 

 2017  Office Visit  Orthopedic Surgery  Woo Bee,  
Intertrochanteric fracture



       MD  of left femur, closed, with



         routine healing, subsequent



         encounter (Primary Dx)



after 09/10/2017



Social History







 Tobacco Use  Types  Packs/Day  Years Used  Date

 

 Never Smoker        









 Alcohol Use  Drinks/Week  oz/Week  Comments

 

 No      









 Sex Assigned at Birth  Date Recorded

 

 Not on file  







Last Filed Vital Signs

Not on file



Plan of Treatment







 Health Maintenance  Due Date  Last Done  Comments

 

 DIABETIC FOOT EXAM  1945    

 

 DIABETIC RETINAL EYE EXAM  1945    

 

 SHINGRIX VACCINE (#1)  1985    

 

 ZOSTER VACCINE  1995    

 

 PNEUMOCOCCAL POLYSACCHARIDE VACCINE AGE 65 AND OVER  2000    

 

 PNEUMOCOCCAL-13  2000    

 

 INFLUENZA VACCINE  2018    







Implants







 Implanted  Type  Area    Device  Expiration  Model /



         Identifier  Date  Serial /



             Lot

 

 T2 F/T Locking Screw 5mmx32.5mm - [id number]  IPM IMPLANT  Left:  SIRENA      
1896 [id number] /



 Implanted: 2017 (Quantity not on file)  DEVICES  Femur  ORTHOPEDICS     
  /

 

 Nail Im Trchntrc W/ Set Scr Ti 130deg [id number] Strl - [id number]  
Orthopedic  Left:  SIRENA    2022  3130 [id number] /



 Implanted: 2017 (Quantity not on file)  Trauma  Femur  ORTHOPEDICS       
/



   Implants          [id number]

 

 Shaft Trnkl Modlr 448mm Strl - [id number]  Orthopedic  N/A: N/A  SIRENA      0227 3000S /



 Implanted: Qty: 1 on 2017 by Woo Bee MD  Trauma    ORTHOPEDICS   
    /



   Implants          [id number]

 

 Screw Bone Lag Ti [id number] - [id number]  Orthopedic  Left:  SIRENA      3060 
0100S /



 Implanted: 2017 (Quantity not on file)  Trauma  Femur  ORTHOPEDICS       
/



   Implants          







Results

Not on fileafter 09/10/2017



Insurance







 Payer  Benefit Plan / Group  Subscriber ID  Type  Phone  Address

 

 MEDICARE  MEDICARE PART A AND B  xxxxxxxxxx  Medicare [place], [place]

 

 AETNA  AETNA HMO,POS,EPO, MC/EC  xxxxxx  HMO    









 Guarantor Name  Account Type  Relation to  Date of Birth  Phone  Billing



     Patient      Address

 

 VALARIE MILIAN  Personal/Family  Self  1935  Home:  [facility] [address]







         [phone]  [place] [phone]

## 2018-09-12 LAB
ALBUMIN SERPL BCP-MCNC: 2.5 G/DL (ref 3.4–5)
ALP SERPL-CCNC: 90 U/L (ref 45–117)
ALT SERPL W P-5'-P-CCNC: 15 U/L (ref 12–78)
AST SERPL W P-5'-P-CCNC: 14 U/L (ref 15–37)
BUN BLD-MCNC: 49 MG/DL (ref 7–18)
GLUCOSE SERPLBLD-MCNC: 112 MG/DL (ref 74–106)
HCT VFR BLD CALC: 32.3 % (ref 36–45)
LYMPHOCYTES # SPEC AUTO: 1.1 K/UL (ref 0.7–4.9)
MCH RBC QN AUTO: 26.2 PG (ref 27–35)
MCV RBC: 81.4 FL (ref 80–100)
PMV BLD: 8.6 FL (ref 7.6–11.3)
POTASSIUM SERPL-SCNC: 4.3 MMOL/L (ref 3.5–5.1)
RBC # BLD: 3.97 M/UL (ref 3.86–4.86)

## 2018-09-12 RX ADMIN — ALPRAZOLAM SCH MG: 0.5 TABLET ORAL at 22:09

## 2018-09-12 RX ADMIN — HUMAN INSULIN SCH: 100 INJECTION, SOLUTION SUBCUTANEOUS at 11:30

## 2018-09-12 RX ADMIN — GABAPENTIN SCH MG: 100 CAPSULE ORAL at 22:06

## 2018-09-12 RX ADMIN — CARVEDILOL SCH MG: 6.25 TABLET, FILM COATED ORAL at 09:34

## 2018-09-12 RX ADMIN — HUMAN INSULIN SCH: 100 INJECTION, SOLUTION SUBCUTANEOUS at 07:30

## 2018-09-12 RX ADMIN — Medication SCH ML: at 09:34

## 2018-09-12 RX ADMIN — APIXABAN SCH MG: 2.5 TABLET, FILM COATED ORAL at 22:06

## 2018-09-12 RX ADMIN — GABAPENTIN SCH MG: 100 CAPSULE ORAL at 14:43

## 2018-09-12 RX ADMIN — FUROSEMIDE SCH MG: 10 INJECTION, SOLUTION INTRAVENOUS at 09:33

## 2018-09-12 RX ADMIN — HUMAN INSULIN SCH: 100 INJECTION, SOLUTION SUBCUTANEOUS at 16:30

## 2018-09-12 RX ADMIN — CEFTRIAXONE SCH MLS: 1 INJECTION, SOLUTION INTRAVENOUS at 09:33

## 2018-09-12 RX ADMIN — CARVEDILOL SCH MG: 6.25 TABLET, FILM COATED ORAL at 22:06

## 2018-09-12 RX ADMIN — CEFTRIAXONE SCH MLS: 1 INJECTION, SOLUTION INTRAVENOUS at 22:05

## 2018-09-12 RX ADMIN — DOCUSATE SODIUM SCH MG: 100 CAPSULE, LIQUID FILLED ORAL at 22:06

## 2018-09-12 RX ADMIN — ATORVASTATIN CALCIUM SCH MG: 40 TABLET, FILM COATED ORAL at 22:06

## 2018-09-12 RX ADMIN — Medication SCH ML: at 22:07

## 2018-09-12 RX ADMIN — AMIODARONE HYDROCHLORIDE SCH MG: 200 TABLET ORAL at 12:36

## 2018-09-12 RX ADMIN — APIXABAN SCH MG: 2.5 TABLET, FILM COATED ORAL at 12:36

## 2018-09-12 RX ADMIN — FUROSEMIDE SCH MG: 10 INJECTION, SOLUTION INTRAVENOUS at 17:25

## 2018-09-12 RX ADMIN — HUMAN INSULIN SCH UNIT: 100 INJECTION, SOLUTION SUBCUTANEOUS at 22:07

## 2018-09-12 RX ADMIN — INSULIN GLARGINE SCH UNITS: 100 INJECTION, SOLUTION SUBCUTANEOUS at 22:06

## 2018-09-12 NOTE — ECHO
HEIGHT: 4 ft 11 in   WEIGHT: 207 lb 0 oz   DATE OF STUDY: 09/12/18   REFER DR: Evelyn Sneed MD

2-DIMENSIONAL: YES

     M.MODE: YES

 DOPPLER: YES

COLOR FLOW: YES



                    TDS:  NO

PORTABLE: NO

 DEFINITY:  NO

BUBBLE STUDY: NO





DIAGNOSIS:  CONGESTIVE HEART FAILURE



CARDIAC HISTORY:  

CATHERIZATION: NO

SURGERY: NO

PROSTHETIC VALVE: NO

PACEMAKER: YES





MEASUREMENTS (cm)

    DIASTOLIC (NORMALS)                 SYSTOLIC (NORMALS)

IVSd                 1.6 (0.6-1.2)                    LA Diam 4.2 (1.9-4.0)     LVEF       
  29%  

LVIDd               5.1 (3.5-5.7)                        LVIDs      4.4 (2.0-3.5)     %FS  
        14%

LVPWd             1.7 (0.6-1.2)

Ao Diam           2.7 (2.0-3.7)



2 DIMENSIONAL ASSESSMENT:

RIGHT ATRIUM:                   NORMAL

LEFT ATRIUM:       DILATED



RIGHT VENTRICLE:            NORMAL

LEFT VENTRICLE: NORMAL SIZE



TRICUSPID VALVE:             NORMAL

MITRAL VALVE:     MITRAL ANNULAR CALCIFICATION



PULMONIC VALVE:             NORMAL

AORTIC VALVE:     SCLEROSIS



PERICARDIAL EFFUSION: NONE

AORTIC ROOT:      NORMAL





LEFT VENTRICULAR WALL MOTION:     SEVERE GLOBAL HYPOKINESIS.



DOPPLER/COLOR FLOW:     MILD TRICUSPID REGURGITATION, MITRAL REGURGITATION  RIGHT 
VENTRICULAR SYSTOLIC PRESSURE 60mmHg.



COMMENTS:      SEVERE GLOBAL HYPOKINESIS. EJECTION FRACTION 30%. MITRAL ANNULAR 
CALCIFICATION. AORTIC SCLEROSIS. MODERATE PULMONARY HYPERTENSION.



TECHNOLOGIST:   EARL BOOTH

## 2018-09-12 NOTE — PN
Date of Progress Note:  09/12/2018



Subjective:  The patient is seen and examined.  Chart was reviewed, and case was discussed with NAYELY Davila.  The patient states her breathing is significantly better.  Still having some cough an
d congestion.



Review of Systems:

Negative except as above.



Medications:  List reviewed.



Physical Examination:

Vital Signs:  Temperature 97, heart rate 70, blood pressure 103/85, respirations 18, O2 of 93% on 3 L
 via nasal cannula. 

General:  An awake, alert, oriented x3, elderly female, morbidly obese. 

CV:  S1 and S2.  Peripheral pulses present. 

Respiratory:  Diminished breath sounds, mild improvement since yesterday.  No tachypnea.  No stridor.
 

Gastrointestinal:  Abdomen is soft, nontender, nondistended.  Positive bowel sounds. 

Extremities:  No clubbing or cyanosis.  Trace pedal edema. 

Neurologic:  Nonfocal.



Laboratory Data:  Sodium 139, potassium 4.3, chloride 108, CO2 of 24, BUN 49, creatinine 1.8, glucose
 112, calcium 8.  AST 14, ALT 15.  WBC 10.3, H and H 10.4 and 32.3, platelets 155, neutrophils 79%.  
Blood cultures, no growth to date.  Echocardiogram shows EF 29%, severe global hypokinesis, mitral an
nular calcification, aortic sclerosis, moderate pulmonary hypertension.



Assessment And Plan:  An 83-year-old female with:

1.Acute congestive heart failure exacerbation, systolic dysfunction, EF now dropped down to 29% from
 previous.  Continue with heart failure guidelines, beta-blocker.  Hold ACE due to elevated creatinin
e and kidney dysfunction.  Continue Lasix IV.  Monitor I's and O's, daily weights.  Appreciate Dr. Kalpesh klein's input.

2.Severe pulmonary hypertension.

3.Atrial fibrillation with controlled ventricular rate.  We will continue Eliquis.

4.History of deep venous thrombosis, on Eliquis.

5.Diabetes mellitus type 2, non-insulin requiring, with hypoglycemia, improved.  We will continue sl
iding scale insulin.  Monitor blood glucose levels.

6.Essential hypertension stable.

7.Mixed hyperlipidemia.  Continue home medications.

8.Acute-on-chronic kidney disease stage 3.  Creatinine is slightly bumped up today.  We will monitor
 creatinine levels.  May need to back off Lasix.  We will repeat chest x-ray in a.m.

9.Depression with anxiety.

10.Status post pacemaker.





SA/MODL

DD:  09/12/2018 14:02:35Voice ID:  [id number]

DT:  09/12/2018 18:44:10Report ID:  [id number]

## 2018-09-12 NOTE — HP
Date of Admission:  09/11/2018



Primary Care Physician:  Dr. Gay.



Code Status:  Full.



Chief Complaint:  Shortness of breath, congestion.



History Of Present Illness:  The patient is an 83-year-old female with past medical history of atrial
 fibrillation on Eliquis, history of lung cancer on the right side, congestive heart failure, obesity
, diabetes, history of DVT, hyperlipidemia, kidney disease, comes in with some cough and congestion g
oing on for the past day or so.  The patient does report some shortness of breath and some chills and
 subjective fever.  Denies any nausea or vomiting.  No chest pain.  The patient does report some juice
a of her feet and states that she has been somewhat compliant with her sodium-restricted and fluid-re
stricted diet for her congestive heart failure.  The patient's symptoms are constant, moderate, progr
essively worsening.  Worse with exertion.  The patient came into the ER for further evaluation and wo
rkup revealed a creatinine of 1.7, which is somewhat elevated and is up from baseline.  White count w
as 11.5 with a left shift.  Her chest x-ray did not show much change from previous x-rays.  There is 
some ill-defined right mid lung opacity, reduced in size, some significant cardiomegaly and pacemaker
.  The patient was then referred for admission.  When seen in the ER, she was awake, alert, oriented 
x3.  Some mild distress.



Past Medical History:  Congestive heart failure; atrial fibrillation, on anticoagulation with Eliquis
; obesity; diabetes mellitus type 2, non-insulin requiring; history of DVT; hyperlipidemia; kidney di
sease; depression and anxiety; hypertension.



Past Surgical History:  Pacemaker, cataract surgery, right leg fracture.



Family History:  Father had heart disease and diabetes.



Social History:  The patient denies any alcohol use, illicit drug use, or tobacco use.



Review of Systems:

An 11-point system reviewed, negative except as per HPI.



Medication List:  Reviewed.



Physical Examination:

Vital Signs:  Blood pressure 172/79, pulse 72, respirations 30, temperature 97.5, which is 98% on non
-rebreather mask. 

General:  Awake, alert, oriented x3.  Some mild distress.  Elderly female, ill-appearing. 

HEENT:  Normocephalic, atraumatic.  PERRLA.  EOMI.  Moist mucous membranes.  Oropharynx is clear.  Po
or dentition.  Conjunctivae anicteric. 

Neck:  Supple.  JVD distention.  Trachea midline. 

CV:  S1, S2, irregularly irregular.  Peripheral pulses present bilaterally. 

Respiratory:  Diminished breath sounds, worse on the right.  No wheezing.  The patient is somewhat ta
chypneic. 

Gastrointestinal:  Abdomen is soft, nontender, nondistended.  Positive bowel sounds.  No guarding or 
rigidity. 

Extremities:  No clubbing, cyanosis.  Pedal edema is present.  No calf tenderness. 

Neuro:  Cranial nerves 2 through 12 intact grossly.  No focal neurological deficit.  Speech is normal
.  Strength is symmetric bilateral upper and lower extremities. 

Skin:  No rashes.  Normal skin turgor. 

Psych:  Mood is okay.  Affect is full.  Insight and judgment are good.



Laboratory Data:  Sodium 137, potassium 4.5, chloride 106, CO2 26, BUN 44, creatinine 1.7, glucose 12
0, lactate is 1.7, calcium 8.5, magnesium 2.2.  BNP 24,891.  Albumin 3.1.  Procal less than 0.05.  IN
R 1.77.  WBC is 11.5, H and H 12.5 and 39.8, platelets 223, neutrophils 81%.  UA:  Negative nitrite, 
negative leukocyte esterase.  Differential pending.  Chest x-ray shows ill-defined right midlung opac
ity similar to comparative study, mildly reduced in size, significant cardiomegaly, and dual lead pac
emaker in place.  EKG shows a paced rhythm, rate of 70.



Assessment And Plan:  

1.Acute congestive heart failure exacerbation, unknown, EF 44%, systolic dysfunction.  We will repea
t echocardiogram.  Continue on congestive heart failure guidelines with beta-blocker.  Hold ACE inhib
itor due to elevated creatinine above baseline.  Lasix 20 b.i.d. IV.  Cardiology consultation.

2.Atrial fibrillation with controlled ventricular rate.  Continue Eliquis.

3.History of deep venous thrombosis, on Eliquis.

4.Diabetes mellitus type 2, non-insulin requiring with hypoglycemia.  We will continue sliding scale
 insulin.

5.Essential hypertension, stable.

6.Mixed hyperlipidemia.  Continue home medications.

7.Acute-on-chronic kidney disease stage 3.  Creatinine above baseline.  Continue to monitor.  Avoid 
NSAIDs.

8.Depression and anxiety.

9.Status post pacemaker.



Plan:  Admit the patient to Med-Surg, place as inpatient.  The patient did have some respiratory dist
ress, was on non-rebreather, has been weaned down to nasal cannula.  We will repeat chest x-ray as cl
inically indicated.  Follow up on blood cultures.  Doubt pneumonia.  However, patient does have some 
congestion.  Lactate and procal negative.  White count only 11.5.  The patient did receive dose of Ro
cephin.  We will continue to monitor, placed on empiric antibiotics until blood cultures are negative
.  Code status is full.  The patient does have a medical power of .





WARREN

DD:  09/11/2018 18:52:59Voice ID:  [id number]

## 2018-09-13 LAB
ALBUMIN SERPL BCP-MCNC: 2.5 G/DL (ref 3.4–5)
ALP SERPL-CCNC: 82 U/L (ref 45–117)
ALT SERPL W P-5'-P-CCNC: 13 U/L (ref 12–78)
AST SERPL W P-5'-P-CCNC: 12 U/L (ref 15–37)
BUN BLD-MCNC: 57 MG/DL (ref 7–18)
BUN BLD-MCNC: 60 MG/DL (ref 7–18)
GLUCOSE SERPLBLD-MCNC: 130 MG/DL (ref 74–106)
GLUCOSE SERPLBLD-MCNC: 262 MG/DL (ref 74–106)
HCT VFR BLD CALC: 30.9 % (ref 36–45)
LYMPHOCYTES # SPEC AUTO: 0.8 K/UL (ref 0.7–4.9)
MCH RBC QN AUTO: 26.4 PG (ref 27–35)
MCV RBC: 81.3 FL (ref 80–100)
PMV BLD: 8.8 FL (ref 7.6–11.3)
POTASSIUM SERPL-SCNC: 4.5 MMOL/L (ref 3.5–5.1)
POTASSIUM SERPL-SCNC: 4.9 MMOL/L (ref 3.5–5.1)
RBC # BLD: 3.81 M/UL (ref 3.86–4.86)

## 2018-09-13 RX ADMIN — HUMAN INSULIN SCH UNIT: 100 INJECTION, SOLUTION SUBCUTANEOUS at 22:41

## 2018-09-13 RX ADMIN — SODIUM CHLORIDE SCH MLS: 0.9 INJECTION, SOLUTION INTRAVENOUS at 16:45

## 2018-09-13 RX ADMIN — ALPRAZOLAM SCH MG: 0.5 TABLET ORAL at 22:40

## 2018-09-13 RX ADMIN — SPIRONOLACTONE SCH MG: 25 TABLET, FILM COATED ORAL at 08:55

## 2018-09-13 RX ADMIN — PANTOPRAZOLE SODIUM SCH MG: 40 TABLET, DELAYED RELEASE ORAL at 05:20

## 2018-09-13 RX ADMIN — HUMAN INSULIN SCH UNIT: 100 INJECTION, SOLUTION SUBCUTANEOUS at 12:34

## 2018-09-13 RX ADMIN — GABAPENTIN SCH MG: 100 CAPSULE ORAL at 22:39

## 2018-09-13 RX ADMIN — FUROSEMIDE SCH MG: 10 INJECTION, SOLUTION INTRAVENOUS at 09:06

## 2018-09-13 RX ADMIN — CEFTRIAXONE SCH MLS: 1 INJECTION, SOLUTION INTRAVENOUS at 09:00

## 2018-09-13 RX ADMIN — DOCUSATE SODIUM SCH: 100 CAPSULE, LIQUID FILLED ORAL at 21:00

## 2018-09-13 RX ADMIN — Medication SCH ML: at 09:08

## 2018-09-13 RX ADMIN — HUMAN INSULIN SCH UNIT: 100 INJECTION, SOLUTION SUBCUTANEOUS at 16:30

## 2018-09-13 RX ADMIN — AMIODARONE HYDROCHLORIDE SCH MG: 200 TABLET ORAL at 08:52

## 2018-09-13 RX ADMIN — CARVEDILOL SCH: 6.25 TABLET, FILM COATED ORAL at 08:57

## 2018-09-13 RX ADMIN — DOCUSATE SODIUM SCH: 100 CAPSULE, LIQUID FILLED ORAL at 08:52

## 2018-09-13 RX ADMIN — HUMAN INSULIN SCH: 100 INJECTION, SOLUTION SUBCUTANEOUS at 07:30

## 2018-09-13 RX ADMIN — Medication SCH ML: at 22:42

## 2018-09-13 RX ADMIN — ATORVASTATIN CALCIUM SCH MG: 40 TABLET, FILM COATED ORAL at 22:40

## 2018-09-13 RX ADMIN — INSULIN GLARGINE SCH UNITS: 100 INJECTION, SOLUTION SUBCUTANEOUS at 22:41

## 2018-09-13 RX ADMIN — CARVEDILOL SCH MG: 6.25 TABLET, FILM COATED ORAL at 22:40

## 2018-09-13 RX ADMIN — CEFTRIAXONE SCH MLS: 1 INJECTION, SOLUTION INTRAVENOUS at 22:42

## 2018-09-13 RX ADMIN — SERTRALINE HYDROCHLORIDE SCH MG: 50 TABLET ORAL at 08:53

## 2018-09-13 RX ADMIN — SODIUM CHLORIDE SCH MLS: 0.9 INJECTION, SOLUTION INTRAVENOUS at 22:39

## 2018-09-13 RX ADMIN — GABAPENTIN SCH MG: 100 CAPSULE ORAL at 15:16

## 2018-09-13 RX ADMIN — GABAPENTIN SCH MG: 100 CAPSULE ORAL at 08:50

## 2018-09-13 RX ADMIN — APIXABAN SCH MG: 2.5 TABLET, FILM COATED ORAL at 22:40

## 2018-09-13 RX ADMIN — APIXABAN SCH MG: 2.5 TABLET, FILM COATED ORAL at 08:51

## 2018-09-13 RX ADMIN — ALPRAZOLAM SCH MG: 0.5 TABLET ORAL at 08:50

## 2018-09-13 NOTE — PN
Date of Progress Note:  09/13/2018



Subjective:  The patient seen and examined.  Chart reviewed and case discussed with RN.  The patient 
states she is doing okay.  No complaints.  Breathing is better.  Edema has improved.



Review of Systems:

Negative except as above.



Medications:  List reviewed.



Physical Examination:

Vital Signs:  Temperature 97.7, heart rate 72, blood pressure 102/56, respirations 18, O2 96% on 2 L 
via nasal cannula. 

General:  Awake, alert, oriented x3, elderly female, morbidly obese. 

CV:  S1, S2.  No murmurs. 

Respiratory:  Diminished breath sounds.  No wheezing.  No crackles. 

Gastrointestinal:  Abdomen soft, nontender, nondistended.  Positive bowel sounds. 

Extremities:  No clubbing, cyanosis, or edema. 

Neuro:  Nonfocal.



Laboratory Data:  Sodium 137, potassium 4.9, chloride 107, CO2 23, BUN 60, creatinine 2.2, glucose 26
2, calcium 8.  WBC 5, H and H 10.1/30.9, platelets 155.  Blood cultures, no growth to date.  Chest x-
ray personally reviewed shows no change in mid right lung opacity, may represent pneumonia __________
 or loculated fluid within the fissure.  Echocardiogram shows EF of 29%, moderate pulmonary hypertens
ion.



Assessment And Plan:  An 83-year-old female with:

1.Acute congestive heart failure exacerbation, systolic dysfunction, ejection fraction 29%.  Continu
e beta-blocker.  Hold ACE inhibitor, and Lasix was discontinued due to worsening kidney function.  Th
e patient is on spironolactone.  Continue to monitor I's and O's, daily weights.  Cardiology on board
.

2.Acute on chronic kidney injury stage 3.  Creatinine worsening today.  We will get Nephrology invol
marta.  Start on some IV fluids.  Gentle IV fluid hydration.

3.Severe pulmonary hypertension.

4.Atrial fibrillation with controlled ventricular rate.  Continue Eliquis.

5.History of deep venous thrombosis, on Eliquis.

6.Diabetes mellitus type 2, non-insulin-requiring with hypoglycemia improved, now hyperglycemia.  We
 will continue sliding scale insulin.

7.Essential hypertension, stable.

8.Mixed hyperlipidemia.  We will continue home medications.

9.Depression with anxiety.

10.Status post pacemaker. 



Plan:  Normal IV fluids.  Monitor kidney function.  Nephrology consultation.





/MODL

DD:  09/13/2018 16:36:15Voice ID:  [id number]

DT:  09/13/2018 21:49:14Report ID:  [id number]

## 2018-09-13 NOTE — CON
Date of Consultation:  09/12/2018



Admitted to Dr. Sneed's service on 09/11/2018.  I saw the patient on 09/12/2018.



Reason For Consultation:  Congestive heart failure.



History Of Present Illness:  Ms. Milian is 83.  She is known to us from the office with many medical p
roblems including lung cancer, anxiety, anemia, gastroesophageal reflux disease.  She has aortic sten
osis.  She has a pacemaker that was just recently replaced in May 2018.  She has atrial fibrillation,
 dyslipidemia, hypertension, and diabetes.  She came in with congestive heart failure symptoms.  Ches
t x-ray shows congestive heart failure.  The patient has already improved with diuresis.  She denied 
any chest pain, denied any syncope, denied any palpitations.



Past Medical History:  As stated above.



Allergies:  SHE IS ALLERGIC TO AMITRIPTYLINE AND RYTHMOL.



Review of Systems:

Negative.



Social History:  Negative.



Family History:  Noncontributory.



Medications:  At home include Eliquis, insulin, losartan, Coreg, amiodarone, Norvasc, and Lipitor.



Physical Examination:

General:  She was in no acute distress, appears her stated age. 

HEENT:  Negative. 

Neck:  Supple without any bruit, lymphadenopathy, JVD, or thyromegaly. 

Chest:  Clear to auscultation and percussion. 

Cardiac:  Revealed a regular rhythm and rate with a 2/6 systolic ejection murmur at the right third i
ntercostal space radiating to the carotid. 

Abdomen:  Benign. 

Extremities:  Revealed 1+ edema. 

Skin:  Dry and intact.  Pulses were adequate distally.



Diagnostic Data:  Her creatinine was 1.7, white count was 18,000, BNP was 24,891.  Echocardiogram tyrone
t was done showed mild ejection fraction reduction of 48%, pacemaker placement in the right ventricle
 apex, mild aortic stenosis.



Impression And Plan:  

1.Mild acute exacerbation of chronic systolic congestive heart failure.

2.Mild aortic stenosis, stable.

3.History of pacemaker placement, recently a generator was replaced in May 2018.

4.Moderate renal insufficiency.

5.Elevated BNP secondary to congestive heart failure.

6.Diabetes.

7.Atrial fibrillation, resolved on amiodarone and Eliquis.

8.Hypertension.

9.Dyslipidemia, well controlled.

10.Mild anemia.

11.Anxiety disorder.

12.Gastroesophageal reflux disease.

13.History of lung cancer.  I agree with her present regimen.  Hopefully, she can go home sometime t
avinash or tomorrow.  When she goes home, I think we need to add Lasix to her regimen at 40 mg daily, an
d I will see her in the office in the next 2 to 4 weeks.





NB/NATIVIDAD

DD:  09/12/2018 20:52:40Voice ID:  [id number]

DT:  09/13/2018 01:38:23Report ID:  [id number]

## 2018-09-13 NOTE — RAD REPORT
EXAM DESCRIPTION:  Aimee Single View9/13/2018 6:08 am

 

CLINICAL HISTORY:  Shortness of breath

 

COMPARISON:  September 11, 2018

 

FINDINGS:  No significant change has occurred in the mid right lung opacities.

 

The left lung appears clear of acute infiltrate.

 

The heart is moderately enlarged. Pacemaker leads are in place

 

IMPRESSION:  No change in a mid right lung opacity. This may represent pneumonia, mass or loculated f
luid within the fissure.

## 2018-09-14 VITALS — SYSTOLIC BLOOD PRESSURE: 126 MMHG | DIASTOLIC BLOOD PRESSURE: 65 MMHG

## 2018-09-14 VITALS — OXYGEN SATURATION: 96 %

## 2018-09-14 VITALS — TEMPERATURE: 97.8 F

## 2018-09-14 LAB
ALBUMIN SERPL BCP-MCNC: 2.8 G/DL (ref 3.4–5)
ALP SERPL-CCNC: 104 U/L (ref 45–117)
ALT SERPL W P-5'-P-CCNC: 17 U/L (ref 12–78)
AST SERPL W P-5'-P-CCNC: 15 U/L (ref 15–37)
BUN BLD-MCNC: 61 MG/DL (ref 7–18)
GLUCOSE SERPLBLD-MCNC: 79 MG/DL (ref 74–106)
HCT VFR BLD CALC: 35.8 % (ref 36–45)
LYMPHOCYTES # SPEC AUTO: 1.1 K/UL (ref 0.7–4.9)
MCH RBC QN AUTO: 26 PG (ref 27–35)
MCV RBC: 82.5 FL (ref 80–100)
PMV BLD: 9 FL (ref 7.6–11.3)
POTASSIUM SERPL-SCNC: 5.1 MMOL/L (ref 3.5–5.1)
RBC # BLD: 4.34 M/UL (ref 3.86–4.86)

## 2018-09-14 RX ADMIN — HUMAN INSULIN SCH: 100 INJECTION, SOLUTION SUBCUTANEOUS at 16:07

## 2018-09-14 RX ADMIN — ALPRAZOLAM SCH MG: 0.5 TABLET ORAL at 09:00

## 2018-09-14 RX ADMIN — SPIRONOLACTONE SCH MG: 25 TABLET, FILM COATED ORAL at 09:36

## 2018-09-14 RX ADMIN — APIXABAN SCH MG: 2.5 TABLET, FILM COATED ORAL at 09:36

## 2018-09-14 RX ADMIN — GABAPENTIN SCH MG: 100 CAPSULE ORAL at 09:36

## 2018-09-14 RX ADMIN — PANTOPRAZOLE SODIUM SCH MG: 40 TABLET, DELAYED RELEASE ORAL at 06:30

## 2018-09-14 RX ADMIN — CEFTRIAXONE SCH MLS: 1 INJECTION, SOLUTION INTRAVENOUS at 09:36

## 2018-09-14 RX ADMIN — HUMAN INSULIN SCH: 100 INJECTION, SOLUTION SUBCUTANEOUS at 07:30

## 2018-09-14 RX ADMIN — GABAPENTIN SCH: 100 CAPSULE ORAL at 14:00

## 2018-09-14 RX ADMIN — SERTRALINE HYDROCHLORIDE SCH MG: 50 TABLET ORAL at 09:36

## 2018-09-14 RX ADMIN — AMIODARONE HYDROCHLORIDE SCH MG: 200 TABLET ORAL at 09:36

## 2018-09-14 RX ADMIN — DOCUSATE SODIUM SCH: 100 CAPSULE, LIQUID FILLED ORAL at 09:00

## 2018-09-14 RX ADMIN — CARVEDILOL SCH MG: 6.25 TABLET, FILM COATED ORAL at 09:36

## 2018-09-14 RX ADMIN — HUMAN INSULIN SCH: 100 INJECTION, SOLUTION SUBCUTANEOUS at 11:30

## 2018-09-14 RX ADMIN — Medication SCH ML: at 09:00

## 2018-09-14 NOTE — P.CNS
Date of Consult: 09/14/18


Reason for Consult: IGLESIA/ CKD


Requesting Physician: Evelyn Sneed


Chief Complaint: Dyspnea


History of Present Illness: 





15:00 This 83 yrs old  Female presents to ER via EMS with complaints 

of Shortness Of pm1 


      Breath.                                                                  

                   


15:00 The patient has shortness of breath at rest. Onset: The symptoms/episode 

began/occurred pm1 


      3 day(s) ago. Duration: The symptoms are continuous, Worse today. The 

patient's             


      shortness of breath has no apparent modifying factors. Associated signs 

and symptoms:       


      Pertinent positives: productive cough, Pertinent negatives: fever, nausea

, vomiting.        


      Severity of symptoms: in the emergency department the symptoms are worse 

Pain is            


      currently a 0 / 10. The patient has experienced similar episodes in the 

past, a few         


      times. The patient has not recently seen a physician, the patient's 

primary care            


      provider is Dr. Gay.                                                    

                   


Allergies





amitriptyline Allergy (Verified 09/11/18 22:33)


 Unknown


rosiglitazone maleate [From Avandia] Allergy (Verified 09/11/18 22:33)


 Anaphylaxis


sitagliptin phosphate [From Januvia] Allergy (Verified 09/11/18 22:33)


 Nausea/Vomiting


propafenone HCl [From Rythmol] Adverse Reaction (Verified 09/11/18 22:33)


 Shortness of breath


Tape Allergy (Uncoded 09/11/18 22:33)


 Itching/Hives/Rash





Home medications list reviewed: Yes


Home Medications: 








ALPRAZolam [Xanax*] 1 tab PO BEDTIME 09/12/18 


ALPRAZolam [Xanax*] 1 tab PO DAILY 09/12/18 


Amiodarone HCl [Cordarone*] 200 mg PO DAILY 09/12/18 


Apixaban [Eliquis *] 0.5 mg PO BID 09/12/18 


Atorvastatin Calcium [Lipitor] 1 tab PO DAILY 09/12/18 


Carvedilol [Coreg*] 1 tab PO BID 09/12/18 


Docusate [Colace Cap*] 1 tab PO BID 09/12/18 


Furosemide [Lasix] 1 tab PO DAILY 09/12/18 


Gabapentin [Neurontin*] 100 mg PO TID 09/12/18 


Insulin Glargine,Hum.rec.anlog [Lantus] 30 unit SQ BEDTIME 09/12/18 


L. Acidophilus/L. Rhamnosus [Probiotic 15 Billion Cell Cap] 1 cap PO DAILY 09/12 /18 


Lansoprazole [Prevacid] 1 cap PO DAILY 09/12/18 


Sertraline [Zoloft*] 50 mg PO DAILY 09/12/18 








- Past Medical/Surgical History


Diabetic: Yes


-: DVT


-: DM


-: HTN


-: Atrial fibrillation


-: Congestive heart failure


-: hyperlipidemia


-: Kidney disease


-: pneumonia


-: depression/anxiety


-: Pacemaker


-: Cataract sx


-: R. leg fracture


-: left hip surgery





- Family History


  ** Mother


Medical History: Cancer


Notes: cervical cancer





  ** Father


Medical History: Heart disease, Diabetes





- Social History


Smoking Status: Unknown if ever smoked


Alcohol use: No


CD- Drugs: No


Caffeine use: Yes


Place of Residence: Home





Review of Systems


10-point ROS is otherwise unremarkable


General: Weakness, Malaise


Respiratory: SOB with Excertion


Cardiovascular: Edema


Neurological: Weakness





Physical Examination














Temp Pulse Resp BP Pulse Ox


 


 97.8 F   80   18   134/77   95 


 


 09/14/18 12:00  09/14/18 12:00  09/14/18 12:00  09/14/18 12:00  09/14/18 12:00








General: Alert, In no apparent distress, Oriented x3


HEENT: Atraumatic, Mucous membr. moist/pink


Neck: Supple, JVD distended


Respiratory: Clear to auscultation bilaterally


Cardiovascular: Regular rate/rhythm, No rubs, Edema


Gastrointestinal: Soft and benign, Non-distended (Large), No guarding


Musculoskeletal: No clubbing, No contractures, No warmth


Integumentary: No rashes, No cyanosis


Neurological: Normal speech





Blood work reviewed in the chart.  Cr 2.10


Imagings Data: 





Reason for Exam: SOB   


Report Status: Signed


EXAM DESCRIPTION:  Aimee Single View9/13/2018 6:08 am


CLINICAL HISTORY:  Shortness of breath


COMPARISON:  September 11, 2018


FINDINGS:  No significant change has occurred in the mid right lung opacities.


The left lung appears clear of acute infiltrate.


The heart is moderately enlarged. Pacemaker leads are in place


IMPRESSION:  No change in a mid right lung opacity. This may represent pneumonia

, mass or loculated fluid within the fissure.


Conclusions/Impression: 





A/


IGLESIA likely CRS, improving.


CKD III with proteinuria.


Hyperkalemia.


Systolic CHF, chronic.


Pulmonary HTN.


HTN with CKD.


DM II with CKD.


Morbid Obesity.





P/ Continue current POC and Medications.


AM labs.  Daily weight.


No NSAIDs.


Stop spironolactone.  Give Kayexalate 15g X1.


Restart Furosemide on discharge.


Hold IVF and diuretics at this time.





Thank you kindly for the consultation.


Case discussed with Dr. Sneed.

## 2018-09-15 NOTE — DS
Date of Discharge:  09/14/2018



Consultants:  Dr. Tim with Nephrology and Dr. Davila with Cardiology.



Admitting Diagnoses:  

1.Acute congestive heart failure exacerbation, systolic dysfunction, improved.

2.Atrial fibrillation with controlled ventricular rate, on Eliquis.

3.History of deep venous thrombosis, on Eliquis.

4.Diabetes mellitus type 2, non-insulin requiring with hypoglycemia.

5.Essential hypertension.

6.Mixed hyperlipidemia.

7.Acute on chronic kidney disease, stage 3.

8.Depression and anxiety.

9.Status post pacemaker.



Discharge Diagnoses:  

1.Acute systolic heart failure exacerbation, ejection fraction 29%.

2.Severe pulmonary hypertension.

3.Acute on chronic kidney injury, stage 3.

4.Atrial fibrillation with controlled ventricular rate, chronic, on Eliquis.

5.History of deep venous thrombosis, on anticoagulation.

6.Diabetes mellitus type 2, non-insulin-requiring with hyperglycemia.

7.Essential hypertension.

8.Mixed hyperlipidemia.

9.Depression and anxiety.

10.Status post pacemaker.



Hospital Course:  The patient is an 83-year-old female who came in with shortness of breath and conge
stion.  Chest x-ray showed some ill-defined lung opacity.  However, the patient's symptoms were more 
consistent with CHF.  She had an elevated BNP.  She was started on gentle diuresis with Lasix and CHF
 guidelines.  ACE inhibitor was held due to her creatinine being above the baseline.  Cardiology was 
consulted.  She had an echocardiogram done which showed EF of 29%.  Spironolactone was added; however
, due to her hyperkalemia, it had to be discontinued.  Other comorbid conditions remained stable.  He
r repeat chest x-ray did show some improvement, but a consistent right-sided opacity.  The patient di
d have some urinary retention, however was able to urinate and did not need a Cabrera catheter to be pl
aced.  The patient is also on supplemental oxygen at home, which will be continued.  The patient did 
well overall and had blood cultures that were negative.  I did not feel this to be pneumonia.  The pa
shania was counseled regarding her fluid restricted low-sodium diet, she was understanding.  The patie
nt is able to ambulate without any significant difficulty and she was feeling back to her baseline.  
She was also seen by Dr. Tim for her acute on chronic kidney injury.  She did have some improveme
nt in her kidney function and was back to baseline.  She was then cleared for discharge, sent home in
 a stable condition.



Activity:  Fall precautions.



Diet:  Low sodium.



Followup:  Follow up with primary care physician in 2-3 days.  Follow up with nephrologist, Dr. Faiza abreu, in 2 weeks.  Return to ER for worsening condition.  Follow up with cardiologist, Dr. Davila, in 
2 weeks.



Medications:  As per medication reconciliation list. 



Total time spent discharging the patient was 32 minutes.



Physical Examination:

General:  Awake, alert, oriented x3.  No acute distress.  Elderly female. 

CV:  S1, S2.  Irregularly irregular.  Peripheral pulses present. 

Respiratory:  Moving air well bilaterally.  Some diminished breath sounds on the right base. 

Gastrointestinal:  Abdomen is soft, nontender, nondistended.  Positive bowel sounds. 

Extremities:  No clubbing, cyanosis, edema. 

Neurologic:  Nonfocal.





SA/MODL

DD:  09/14/2018 15:49:10Voice ID:  [id number]

DT:  09/15/2018 06:49:12Report ID:  [id number]

## 2018-09-28 ENCOUNTER — HOSPITAL ENCOUNTER (INPATIENT)
Dept: HOSPITAL 97 - ER | Age: 83
LOS: 3 days | Discharge: HOME HEALTH | DRG: 291 | End: 2018-10-01
Attending: FAMILY MEDICINE | Admitting: FAMILY MEDICINE
Payer: COMMERCIAL

## 2018-09-28 VITALS — BODY MASS INDEX: 46.7 KG/M2

## 2018-09-28 DIAGNOSIS — F41.8: ICD-10-CM

## 2018-09-28 DIAGNOSIS — E78.5: ICD-10-CM

## 2018-09-28 DIAGNOSIS — I50.23: ICD-10-CM

## 2018-09-28 DIAGNOSIS — D63.1: ICD-10-CM

## 2018-09-28 DIAGNOSIS — I13.0: Primary | ICD-10-CM

## 2018-09-28 DIAGNOSIS — Z95.0: ICD-10-CM

## 2018-09-28 DIAGNOSIS — N18.3: ICD-10-CM

## 2018-09-28 DIAGNOSIS — Z23: ICD-10-CM

## 2018-09-28 DIAGNOSIS — Z79.01: ICD-10-CM

## 2018-09-28 DIAGNOSIS — I48.2: ICD-10-CM

## 2018-09-28 DIAGNOSIS — Z86.718: ICD-10-CM

## 2018-09-28 DIAGNOSIS — Z85.118: ICD-10-CM

## 2018-09-28 DIAGNOSIS — I27.20: ICD-10-CM

## 2018-09-28 DIAGNOSIS — E27.9: ICD-10-CM

## 2018-09-28 DIAGNOSIS — E11.22: ICD-10-CM

## 2018-09-28 DIAGNOSIS — N28.1: ICD-10-CM

## 2018-09-28 DIAGNOSIS — E11.40: ICD-10-CM

## 2018-09-28 LAB
ALBUMIN SERPL BCP-MCNC: 3 G/DL (ref 3.4–5)
ALP SERPL-CCNC: 123 U/L (ref 45–117)
ALT SERPL W P-5'-P-CCNC: 13 U/L (ref 12–78)
AST SERPL W P-5'-P-CCNC: 16 U/L (ref 15–37)
BUN BLD-MCNC: 49 MG/DL (ref 7–18)
GLUCOSE SERPLBLD-MCNC: 396 MG/DL (ref 74–106)
HCT VFR BLD CALC: 35.8 % (ref 36–45)
LIPASE SERPL-CCNC: 80 U/L (ref 73–393)
LYMPHOCYTES # SPEC AUTO: 0.8 K/UL (ref 0.7–4.9)
MCH RBC QN AUTO: 25.8 PG (ref 27–35)
MCV RBC: 82.2 FL (ref 80–100)
PMV BLD: 8.6 FL (ref 7.6–11.3)
POTASSIUM SERPL-SCNC: 5.1 MMOL/L (ref 3.5–5.1)
RBC # BLD: 4.35 M/UL (ref 3.86–4.86)

## 2018-09-28 PROCEDURE — 74018 RADEX ABDOMEN 1 VIEW: CPT

## 2018-09-28 PROCEDURE — 93005 ELECTROCARDIOGRAM TRACING: CPT

## 2018-09-28 PROCEDURE — 93306 TTE W/DOPPLER COMPLETE: CPT

## 2018-09-28 PROCEDURE — 96374 THER/PROPH/DIAG INJ IV PUSH: CPT

## 2018-09-28 PROCEDURE — 36415 COLL VENOUS BLD VENIPUNCTURE: CPT

## 2018-09-28 PROCEDURE — 94760 N-INVAS EAR/PLS OXIMETRY 1: CPT

## 2018-09-28 PROCEDURE — 85025 COMPLETE CBC W/AUTO DIFF WBC: CPT

## 2018-09-28 PROCEDURE — 80053 COMPREHEN METABOLIC PANEL: CPT

## 2018-09-28 PROCEDURE — 80076 HEPATIC FUNCTION PANEL: CPT

## 2018-09-28 PROCEDURE — 85027 COMPLETE CBC AUTOMATED: CPT

## 2018-09-28 PROCEDURE — 71045 X-RAY EXAM CHEST 1 VIEW: CPT

## 2018-09-28 PROCEDURE — 74176 CT ABD & PELVIS W/O CONTRAST: CPT

## 2018-09-28 PROCEDURE — 99285 EMERGENCY DEPT VISIT HI MDM: CPT

## 2018-09-28 PROCEDURE — 83880 ASSAY OF NATRIURETIC PEPTIDE: CPT

## 2018-09-28 PROCEDURE — 80048 BASIC METABOLIC PNL TOTAL CA: CPT

## 2018-09-28 PROCEDURE — 83690 ASSAY OF LIPASE: CPT

## 2018-09-28 PROCEDURE — 82962 GLUCOSE BLOOD TEST: CPT

## 2018-09-28 RX ADMIN — HUMAN INSULIN SCH UNIT: 100 INJECTION, SOLUTION SUBCUTANEOUS at 21:17

## 2018-09-28 RX ADMIN — CARVEDILOL SCH MG: 6.25 TABLET, FILM COATED ORAL at 21:20

## 2018-09-28 RX ADMIN — Medication SCH ML: at 21:19

## 2018-09-28 RX ADMIN — INSULIN GLARGINE SCH UNITS: 100 INJECTION, SOLUTION SUBCUTANEOUS at 21:19

## 2018-09-28 RX ADMIN — HUMAN INSULIN SCH UNIT: 100 INJECTION, SOLUTION SUBCUTANEOUS at 18:15

## 2018-09-28 RX ADMIN — DOCUSATE SODIUM SCH: 100 CAPSULE, LIQUID FILLED ORAL at 21:00

## 2018-09-28 RX ADMIN — DOCUSATE SODIUM SCH MG: 100 CAPSULE, LIQUID FILLED ORAL at 21:21

## 2018-09-28 RX ADMIN — ALBUMIN (HUMAN) SCH MG: 5 SOLUTION INTRAVENOUS at 22:34

## 2018-09-28 RX ADMIN — GABAPENTIN SCH MG: 100 CAPSULE ORAL at 21:20

## 2018-09-28 RX ADMIN — ALPRAZOLAM SCH MG: 0.5 TABLET ORAL at 21:20

## 2018-09-28 RX ADMIN — APIXABAN SCH: 2.5 TABLET, FILM COATED ORAL at 21:00

## 2018-09-28 NOTE — RAD REPORT
EXAM DESCRIPTION:  RAD - Chest Single View - 9/28/2018 1:22 pm

 

CLINICAL HISTORY:  ABDOMINAL DISTENTION

Chest pain.

 

COMPARISON:  Chest Single View dated 9/13/2018; Chest Single View dated 9/11/2018; Chest Pa And Lat (
2 Views) dated 5/22/2018; Chest Single View dated 8/25/2017

 

FINDINGS:  Portable technique limits examination quality.

 

Chronic triangular opacity is again noted in the right lung, unchanged. The lungs demonstrate mild in
terstitial pulmonary edema. The heart is significantly prominent size with dual lead pacer device. Ch
ondroid lesion in the proximal left humerus is unchanged.

 

IMPRESSION:  Stable chest since 09/13/2018 study.

## 2018-09-28 NOTE — ER
Nurse's Notes                                                                                     

 [facility]                                                                

Name: Lianne Milian                                                                               

Age: 83 yrs                                                                                       

Sex: Female                                                                                       

: 1935                                                                                   

MRN: [id number]                                                                                   

Arrival Date: 2018                                                                          

Time: 12:13                                                                                       

Account#: [id number]                                                                            

Bed 18                                                                                            

Private MD:                                                                                       

Diagnosis: Ascites;Pleural effusion, not elsewhere classified;Volume overload                     

                                                                                                  

Presentation:                                                                                     

                                                                                             

12:14 Presenting complaint: EMS states: Abdominal pain and distention for 4 days. Patient     aj  

      reports 20 lb weight gain in 1 week. Reports SOB with deep respiration. Transition of       

      care: patient was not received from another setting of care. Onset of symptoms was          

      2018. Risk Assessment: Do you want to hurt yourself or someone else?          

      Patient reports no desire to harm self or others. Initial Sepsis Screen: Does the           

      patient meet any 2 criteria? No. Patient's initial sepsis screen is negative. Does the      

      patient have a suspected source of infection? No. Patient's initial sepsis screen is        

      negative. Care prior to arrival: IV initiated. 22 GA, in the right hand.                    

12:14 Method Of Arrival: EMS: [facility]  

12:14 Acuity: LASHAUN 3                                                                           aj  

                                                                                                  

Triage Assessment:                                                                                

12:22 General: Appears in no apparent distress. comfortable, Behavior is calm, cooperative,   aj  

      appropriate for age. Pain: Complains of pain in abdomen. Neuro: Level of Consciousness      

      is awake, alert, obeys commands, Oriented to person, place, time, situation,                

      Appropriate for age. Respiratory: Airway is patent Respiratory effort is even,              

      unlabored, Respiratory pattern is regular, symmetrical. Respiratory: Reports shortness      

      of breath. GI: Abdomen is round distended, obese, Abdomen is tender to palpation X 4        

      quads. Derm: Skin is intact, is healthy with good turgor, Skin is pink, warm \T\ dry.       

      normal.                                                                                     

                                                                                                  

Historical:                                                                                       

- Allergies:                                                                                      

12:22 Amitriptyline;                                                                          aj  

12:22 Januvia;                                                                                aj  

12:22 propafenone HCl;                                                                        aj  

12:22 rosiglitazone maleate;                                                                  aj  

12:22 Rythmol;                                                                                aj  

12:22 sitagliptin phosphate;                                                                  aj  

12:22 Tape;                                                                                   aj  

- Home Meds:                                                                                      

12:22 alprazolam 0.5 mg Oral tab as needed [Active]; lansoprazole 30 mg Oral cpDR 1 cap once  aj  

      daily [Active]; metolazone 5 mg oral tab 1 tab 3 times per week [Active]; gabapentin        

      100 mg Oral cap [Active]; docusate sodium 100 mg Oral cap 1 cap 2 times per day             

      [Active]; Lasix 40 mg Oral tab 1 tab once daily [Active]; atorvastatin 40 mg Oral tab 1     

      tab once daily [Active]; carvedilol 6.25 mg Oral tab 1 tab 2 times per day [Active];        

      sertraline 50 mg oral tab 1 tab once daily [Active];                                        

- PMHx:                                                                                           

12:22 Anxiety; Anemia; Cancer, Lung; Diabetes - IDDM; GERD; hital hernia; Hypertension;       aj  

                                                                                                  

- Immunization history:: Adult Immunizations up to date.                                          

- Social history:: Smoking status: Patient/guardian denies using tobacco.                         

- Ebola Screening: : Patient negative for fever greater than or equal to 101.5 degrees            

  Fahrenheit, and additional compatible Ebola Virus Disease symptoms Patient denies               

  exposure to infectious person Patient denies travel to an Ebola-affected area in the            

  21 days before illness onset No symptoms or risks identified at this time.                      

- Family history:: not pertinent.                                                                 

                                                                                                  

                                                                                                  

Screenin:51 Abuse screen: Denies threats or abuse. Denies injuries from another. Nutritional        aj  

      screening: No deficits noted. Tuberculosis screening: No symptoms or risk factors           

      identified. Fall Risk None identified.                                                      

                                                                                                  

Assessment:                                                                                       

12:49 Reassessment: notified C pt finished drinking PO contrast, tolerated well.              em  

13:57 Reassessment: Patient appears in no apparent distress at this time. No changes from     aj  

      previously documented assessment. Patient and/or family updated on plan of care and         

      expected duration. Pain level reassessed. Patient is alert, oriented x 3, equal             

      unlabored respirations, skin warm/dry/pink. Family is at bedside. General: Appears in       

      no apparent distress. comfortable, Behavior is calm, cooperative, appropriate for age.      

17:06 Reassessment: Patient appears in no apparent distress at this time. No changes from     aj  

      previously documented assessment. Patient and/or family updated on plan of care and         

      expected duration. Pain level reassessed. Patient is alert, oriented x 3, equal             

      unlabored respirations, skin warm/dry/pink. Patient states feeling better.                  

                                                                                                  

Vital Signs:                                                                                      

12:22  / 91; Pulse 70; Resp 21; Temp 97.9; Pulse Ox 96% on R/A; Weight 105.69 kg;       aj  

      Height 5 ft. 0 in. (152.40 cm);                                                             

13:58  / 92; Pulse 70; Resp 15; Pulse Ox 97% on R/A;                                    aj  

17:06  / 80; Pulse 70; Resp 19; Pulse Ox 98% on R/A;                                    aj  

12:22 Body Mass Index 45.50 (105.69 kg, 152.40 cm)                                            aj  

                                                                                                  

ED Course:                                                                                        

12:13 Patient arrived in ED.                                                                  aj  

12:16 Triage completed.                                                                       aj  

12:22 Jose F Angeles MD is Attending Physician.                                                rn  

12:22 Arm band placed on left wrist. Patient placed in an exam room, on a stretcher, on       aj  

      cardiac monitor, on pulse oximetry.                                                         

12:44 Nimisha Bocanegra, RN is Primary Nurse.                                                     aj  

12:51 Patient has correct armband on for positive identification.                             aj  

12:51 Cardiac monitor on. Pulse ox on. NIBP on.                                               aj  

12:51 Inserted saline lock: 20 gauge in right forearm, using aseptic technique. Maintain EMS  aj  

      IV. Gauge \T\ site: 22 right hand.                                                          

13:22 X-ray completed. Patient tolerated procedure well.                                      [facility] 

13:22 XRAY Chest (1 view) In Process Unspecified.                                             EDMS

13:22 XRAY KUB In Process Unspecified.                                                        EDMS

14:09 Patient moved to CT via stretcher.                                                      sw  

14:19 Abdomen In Process Unspecified.                                                         EDMS

15:01 Evelyn Sneed MD is Hospitalizing Provider.                                            rn  

17:06 No provider procedures requiring assistance completed. Patient admitted, IV remains in  aj  

      place. intact.                                                                              

                                                                                                  

Administered Medications:                                                                         

15:35 Drug: Lasix 40 mg Route: IVP; Site: right hand;                                         aj  

16:47 Follow up: Response: No adverse reaction                                                  

                                                                                                  

                                                                                                  

Outcome:                                                                                          

15:01 Decision to Hospitalize by Provider.                                                    rn  

17:06 Admitted to Med/surg accompanied by tech, room 413, Report called to  Carin              aj  

17:34 Condition: good                                                                         aj  

17:35 Patient left the ED.                                                                    aj  

                                                                                                  

Signatures:                                                                                       

Dispatcher [employer]                           EDMS                                                 

Nimisha Bocanegra, RN                       RN   John Serrano, LVN                       LVN  em                                                   

Jose F Angeles MD MD rn Gallaway, Ashley                             ag1                                                  

Berta Dhaliwal                                                                                 

                                                                                                  

**************************************************************************************************

## 2018-09-28 NOTE — EDPHYS
Physician Documentation                                                                           

 [facility]                                                                

Name: Lianne Milian                                                                               

Age: 83 yrs                                                                                       

Sex: Female                                                                                       

: 1935                                                                                   

MRN: [id number]                                                                                   

Arrival Date: 2018                                                                          

Time: 12:13                                                                                       

Account#: [id number]                                                                            

Bed 18                                                                                            

Private MD:                                                                                       

ED Physician Jose F Angeles                                                                         

HPI:                                                                                              

                                                                                             

13:52 This 83 yrs old  Female presents to ER via EMS with complaints of Abdominal    rn  

      Swelling.                                                                                   

13:52 The patient presents with abdominal distention. Onset: The symptoms/episode             rn  

      began/occurred 1 week(s) ago. The symptoms do not radiate. Associated signs and             

      symptoms: Pertinent positives: shortness of breath, Pertinent negatives: anorexia,          

      blood in stools, chest pain, constipation, diarrhea, dysuria, fever. Modifying factors:     

      The symptoms are alleviated by nothing, the symptoms are aggravated by nothing.             

      Severity of pain: At its worst the pain was mild in the emergency department the pain       

      is unchanged. The patient has not experienced similar symptoms in the past. Reports         

      admitted last week for CHF, feels better now, but since discharge has been having abd       

      swelling, is having normal bowel movements, reports decreased urinary output, no            

      vomiting/fever. .                                                                           

                                                                                                  

Historical:                                                                                       

- Allergies:                                                                                      

12:22 Amitriptyline;                                                                          aj  

12:22 Januvia;                                                                                aj  

12:22 propafenone HCl;                                                                        aj  

12:22 rosiglitazone maleate;                                                                  aj  

12:22 Rythmol;                                                                                aj  

12:22 sitagliptin phosphate;                                                                  aj  

12:22 Tape;                                                                                   aj  

- Home Meds:                                                                                      

12:22 alprazolam 0.5 mg Oral tab as needed [Active]; lansoprazole 30 mg Oral cpDR 1 cap once  aj  

      daily [Active]; metolazone 5 mg oral tab 1 tab 3 times per week [Active]; gabapentin        

      100 mg Oral cap [Active]; docusate sodium 100 mg Oral cap 1 cap 2 times per day             

      [Active]; Lasix 40 mg Oral tab 1 tab once daily [Active]; atorvastatin 40 mg Oral tab 1     

      tab once daily [Active]; carvedilol 6.25 mg Oral tab 1 tab 2 times per day [Active];        

      sertraline 50 mg oral tab 1 tab once daily [Active];                                        

- PMHx:                                                                                           

12:22 Anxiety; Anemia; Cancer, Lung; Diabetes - IDDM; GERD; hital hernia; Hypertension;       aj  

                                                                                                  

- Immunization history:: Adult Immunizations up to date.                                          

- Social history:: Smoking status: Patient/guardian denies using tobacco.                         

- Ebola Screening: : Patient negative for fever greater than or equal to 101.5 degrees            

  Fahrenheit, and additional compatible Ebola Virus Disease symptoms Patient denies               

  exposure to infectious person Patient denies travel to an Ebola-affected area in the            

  21 days before illness onset No symptoms or risks identified at this time.                      

- Family history:: not pertinent.                                                                 

                                                                                                  

                                                                                                  

ROS:                                                                                              

13:52 Constitutional: Negative for fever, chills, and weight loss, Eyes: Negative for injury, rn  

      pain, redness, and discharge, Cardiovascular: Negative for chest pain, palpitations,        

      and edema, Respiratory: mild sob Abdomen/GI: + abd distension and pain MS/Extremity:        

      Negative for injury and deformity, Skin: Negative for injury, rash, and discoloration,      

      Neuro: Negative for headache, weakness, numbness, tingling, and seizure.                    

                                                                                                  

Exam:                                                                                             

13:52 Constitutional:  This is a well developed, well nourished patient who is awake, alert,  rn  

      and in no acute distress. Head/Face:  Normocephalic, atraumatic. Eyes:  Pupils equal        

      round and reactive to light, extra-ocular motions intact.  Lids and lashes normal.          

      Conjunctiva and sclera are non-icteric and not injected.  Cornea within normal limits.      

      Periorbital areas with no swelling, redness, or edema. Neck:  Trachea midline, no           

      thyromegaly or masses palpated, and no cervical lymphadenopathy.  Supple, full range of     

      motion without nuchal rigidity, or vertebral point tenderness.  No Meningismus.             

      Cardiovascular:  Regular rate and rhythm with a normal S1 and S2.  No gallops, murmurs,     

      or rubs.  Normal PMI, no JVD.  No pulse deficits. Respiratory:  mild tachypnea with         

      diminished breath sounds at bases Abdomen/GI:  soft, + abd distension, mild mid abd         

      tenderness, no rebound/masses MS/ Extremity:  Pulses equal, no cyanosis.  Neurovascular     

      intact.  Full, normal range of motion.  Equal circumference. Neuro:  Awake and alert,       

      GCS 15, oriented to person, place, time, and situation.  Cranial nerves II-XII grossly      

      intact.  Motor strength 5/5 in all extremities.  Sensory grossly intact.                    

                                                                                                  

Vital Signs:                                                                                      

12:22  / 91; Pulse 70; Resp 21; Temp 97.9; Pulse Ox 96% on R/A; Weight 105.69 kg;       aj  

      Height 5 ft. 0 in. (152.40 cm);                                                             

13:58  / 92; Pulse 70; Resp 15; Pulse Ox 97% on R/A;                                    aj  

17:06  / 80; Pulse 70; Resp 19; Pulse Ox 98% on R/A;                                    aj  

12:22 Body Mass Index 45.50 (105.69 kg, 152.40 cm)                                              

                                                                                                  

MDM:                                                                                              

12:22 Patient medically screened.                                                             rn  

14:54 Differential diagnosis: non-specific abd pain, Peritonitis, ascites, volume overload,   rn  

      CHF. Data reviewed: vital signs, nurses notes, lab test result(s), radiologic studies,      

      CT scan, plain films, and as a result, I will admit patient. Counseling: I had a            

      detailed discussion with the patient and/or guardian regarding: the historical points,      

      exam findings, and any diagnostic results supporting the discharge/admit diagnosis, lab     

      results, radiology results, the need for further work-up and treatment in the hospital.     

      Response to treatment: the patient's symptoms have mildly improved after treatment, and     

      as a result, I will admit patient. Admission orders: after a detailed discussion of the     

      patient's condition and case, the admit orders are written by me. ED course: Pt with        

      mild ascites, likely transudative from volume overload, BNP 08595 and bilateral pleural     

      effusions, will admit to Dr. Sneed. .                                                       

                                                                                                  

                                                                                             

12:30 Order name: Basic Metabolic Panel; Complete Time: 13:52                                 rn  

09/28                                                                                             

12:30 Order name: CBC with Diff; Complete Time: 13:52                                         rn  

09/28                                                                                             

12:30 Order name: Hepatic Function; Complete Time: :52                                      rn  

09/28                                                                                             

12:30 Order name: Lipase; Complete Time: :52                                                rn  

09/28                                                                                             

12:30 Order name: XRAY Chest (1 view); Complete Time: 13:52                                   rn  

09/28                                                                                             

12:30 Order name: N-Terminal Pro-brain Natriuretic Peptide; Complete Time: 13:52              rn  

09/28                                                                                             

12:30 Order name: IV Saline Lock; Complete Time: 12:51                                        rn  

09/28                                                                                             

12:30 Order name: Labs collected and sent; Complete Time: 12:51                               rn  

09/28                                                                                             

12:30 Order name: XRAY KUB; Complete Time: 13:52                                              rn  

09/28                                                                                             

13:56 Order name: Abdomen ; Complete Time: 14:39                                              EDMS

                                                                                                  

Administered Medications:                                                                         

15:35 Drug: Lasix 40 mg Route: IVP; Site: right hand;                                           

16:47 Follow up: Response: No adverse reaction                                                  

                                                                                                  

                                                                                                  

Disposition:                                                                                      

18 15:01 Hospitalization ordered by Evelyn Sneed for Inpatient Admission. Preliminary      

  diagnosis are Ascites, Pleural effusion, not elsewhere classified, Volume                       

  overload.                                                                                       

- Bed requested for Telemetry/MedSurg (Inpatient).                                                

- Status is Inpatient Admission.                                                              aj  

- Condition is Stable.                                                                            

- Problem is an ongoing problem.                                                                  

- Symptoms have improved.                                                                         

UTI on Admission? No                                                                              

                                                                                                  

                                                                                                  

                                                                                                  

Signatures:                                                                                       

Dispatcher [employer]                           EDMS                                                 

Nimisha Bocanegra RN RN aj Nieto, Roman, MD MD rn Smirch, Shelby, RN RN   ss                                                   

                                                                                                  

Corrections: (The following items were deleted from the chart)                                    

13:56 12:31 Abdomen Pelvis W Con+CT.RAD.BRZ ordered. [facility]                                     EDMS

16:09 15:01 Hospitalization Ordered by Evelyn Sneed MD for Inpatient Admission. Preliminary   ss  

      diagnosis is Ascites; Pleural effusion, not elsewhere classified; Volume overload. Bed      

      requested for Telemetry/MedSurg (Inpatient). Status is Inpatient Admission. Condition       

      is Stable. Problem is an ongoing problem. Symptoms have improved. UTI on Admission? No.     

      rn                                                                                          

17:35 16:09 2018 15:01 Hospitalization Ordered by Evelyn Sneed MD for Inpatient           

      Admission. Preliminary diagnosis is Ascites; Pleural effusion, not elsewhere                

      classified; Volume overload. Bed requested for Telemetry/MedSurg (Inpatient). Status is     

      Inpatient Admission. Condition is Stable. Problem is an ongoing problem. Symptoms have      

      improved. UTI on Admission? No. ss                                                          

                                                                                                  

**************************************************************************************************

## 2018-09-28 NOTE — XMS REPORT
Clinical Summary

 Created on:2018



Patient:Lianne Milian

Sex:Female

:1935

External Reference #:[id number]





Demographics







 Address  [address]



   [place] [address]

 

 Home Phone  [phone]

 

 Home Phone  [phone]

 

 Email Address  [email or web address]

 

 Preferred Language  English

 

 Marital Status  

 

 Bahai Affiliation  Unknown

 

 Race  White

 

 Ethnic Group  Not  or 









Author







 Organization  [facility]

 

 Address  [address] [place] [address]









Support







 Name  Relationship  Address  Phone

 

 Nayely Milian  Child  [address]  [phone]



     [place] [address]  









Care Team Providers







 Name  Role  Phone

 

 Terry Gay MD  Primary Care Provider  [phone]









Allergies







 Active Allergy  Reactions  Severity  Noted Date  Comments

 

 Propafenone      2016  







Current Medications







 Prescription  Sig.  Disp.  Refills  Start Date  End Date  Status

 

 metFORMIN (GLUCOPHAGE)  Take 500 mg by          Active



 500 MG tablet  mouth 2 (two)          



   times a day with          



   meals.          

 

 lisinopril  Take 10 mg by          Active



 (PRINIVIL,ZESTRIL) 10  mouth daily.          



 MG tablet            

 

 apixaban (ELIQUIS) 5  Take 2.5 mg by          Active



 mg tablet  mouth 2 (two)          



   times a day.          

 

 sertraline (ZOLOFT) 50  Take 50 mg by          Active



 MG tablet  mouth daily.          

 

 atorvastatin (LIPITOR)  Take 40 mg by          Active



 40 MG tablet  mouth daily with          



   dinner.          

 

 lansoprazole  Take 30 mg by          Active



 (PREVACID) 30 MG  mouth daily          



 capsule  before          



   breakfast.          

 

 diphenhydrAMINE  Take 25 mg by          Active



 (BENADRYL) 25 mg  mouth nightly as          



 tablet  needed for          



   sleep.          

 

 cyanocobalamin 1000  Take 1,000 mcg          Active



 MCG tablet  by mouth daily.          

 

 metoprolol succinate  Take 1 tablet  30 tablet  0  2017  10/05/2017  




 XL (TOPROL-XL) 50 mg  (50 mg total) by          



 24 hr tablet  mouth daily for          



   30 days.          

 

 insulin GLARGINE  Inject 25 Units  7.5 mL  0  2017  10/05/2017  



 (LANTUS) 100 unit/mL  under the skin          



 injection (vial)  nightly for 30          



   days.          

 

 insulin lispro  Inject 8 Units  10 mL  12  2017  10/05/2017  



 (HumaLOG) 100 unit/mL  under the skin 3          



 injection  (three) times a          



   day with meals          



   for 30 days.          

 

 furosemide (LASIX) 20  Take 1 tablet  30 tablet  0  2017  10/05/2017  




 mg tablet  (20 mg total) by          



   mouth daily for          



   30 days.          

 

 aspirin 81 mg chewable  Chew 1 tablet  30 tablet  0  2017  10/05/2017  




 tablet  (81 mg total)          



   daily for 30          



   days.          

 

 nystatin (MYCOSTATIN)  Apply topically  15 g    2017  10/05/2017  



 100,000 unit/gram  2 (two) times a          



 powder  day for 30 days.          

 

 isosorbide mononitrate  Take 1 tablet  30 tablet  0  2017  10/05/2017  




 (IMDUR) 30 MG 24 hr  (30 mg total) by          



 tablet  mouth daily for          



   30 days.          







Active Problems







 Problem  Noted Date

 

 Intertrochanteric fracture of left femur  2017

 

 Systolic heart failure, chronic  2017

 

 Acute kidney injury superimposed on CKD  2017

 

 Type 2 diabetes mellitus with hyperglycemia  2017

 

 Hip fracture requiring operative repair  2017







Encounters







 Date  Type  Specialty  Care Team  Description

 

 10/13/2017  Telephone  Orthopedic Surgery  Woo Bee MD  



after 2017



Social History







 Tobacco Use  Types  Packs/Day  Years Used  Date

 

 Never Smoker        









 Alcohol Use  Drinks/Week  oz/Week  Comments

 

 No      









 Sex Assigned at Birth  Date Recorded

 

 Not on file  







Last Filed Vital Signs

Not on file



Plan of Treatment







 Health Maintenance  Due Date  Last Done  Comments

 

 DIABETIC FOOT EXAM  1945    

 

 DIABETIC RETINAL EYE EXAM  1945    

 

 SHINGRIX VACCINE (#1)  1985    

 

 ZOSTER VACCINE  1995    

 

 PNEUMOCOCCAL POLYSACCHARIDE VACCINE AGE 65 AND OVER  2000    

 

 PNEUMOCOCCAL-13  2000    

 

 INFLUENZA VACCINE  2018    







Implants







 Implanted  Type  Area    Device  Expiration  Model /



         Identifier  Date  Serial /



             Lot

 

 T2 F/T Locking Screw 5mmx32.5mm - [id number]  IPM IMPLANT  Left:  SIRENA      
189 [id number] /



 Implanted: 2017 (Quantity not on file)  DEVICES  Femur  ORTHOPEDICS     
  /

 

 Nail Im Trchntrc W/ Set Scr Ti 130deg [id number] Strl - [id number]  
Orthopedic  Left:  SIRENA    2022  3130 [id number] /



 Implanted: 2017 (Quantity not on file)  Trauma  Femur  ORTHOPEDICS       
/



   Implants          [id number]

 

 Shaft Trnkl Modlr 448mm Strl - [id number]  Orthopedic  N/A: N/A  SIRENA      0227 [id number] /



 Implanted: Qty: 1 on 2017 by Woo Bee MD  Trauma    ORTHOPEDICS   
    /



   Implants          [id number]

 

 Screw Bone Lag Ti [id number] - [id number]  Orthopedic  Left:  SIRENA      3060 
0100S /



 Implanted: 2017 (Quantity not on file)  Trauma  Femur  ORTHOPEDICS       
/



   Implants          







Results

Not on fileafter 2017



Insurance







 Payer  Benefit Plan / Group  Subscriber ID  Type  Phone  Address

 

 MEDICARE  MEDICARE PART A AND B  xxxxxxxxxx  Medicare [place], [place]

 

 AETNA  AETNA HMO,POS,EPO, MC/EC  xxxxxx  HMO    









 Guarantor Name  Account Type  Relation to  Date of Birth  Phone  Billing



     Patient      Address

 

 MARY ELLEN MILIANBAILEE  Personal/Family  Self  1935  Home:  [address] [place] [address]







         [phone]  [place] [phone]

## 2018-09-28 NOTE — XMS REPORT
Continuity of Care Document

 Created on:2017



Patient:VALARIE VALENCIA

Sex:Female

:1935

External Reference #:[id number]





Demographics







 Address  [address] [place] [address]

 

 Phone  [phone]

 

 Preferred Language  Unknown

 

 Marital Status  Unknown

 

 Hoahaoism Affiliation  Unknown

 

 Race  Unknown

 

 Ethnic Group  Unknown









Author







 Organization  [facility]









Problems







 Problem  Status  Onset  Classification  Date  Comments  Source



     Date    Reported    



             



             

 

 Discharge    2017    [facility]



 Diagnosis:    7        



 Complication of            



 Cabrera catheter            



             



             

 

 CATHETER PAIN  Active          [facility]



     7        [place]



             



             







Medications







 Medication  Details  Route  Status  Patient  Ordering  Order  Source



         Instructions  Provider  Date  



               



               



               

 

 Acetaminophen  1 tab,    Inactive        [facility]



 300 MG / Codeine  Route: PO,          017  [place]



 Phosphate 30 MG  Drug Form:            



 Oral Tablet  TAB,            



 [Tylenol with  Dosing            



 Codeine #3]  Weight            



   81.818,            



   kg, ONCE,            



   STAT,            



   Start            



   date:            



   17            



   21:22:00            



   CDT, Stop            



   date:            



   17            



   21:22:00            



   CDTNotes:            



   Do not            



   exceed            



   4gm/day of            



   acetaminop            



   hen.            



   (Same as:            



   Tylenol            



   with            



   Codeine #            



   3)            



               



               







Allergies, Adverse Reactions, Alerts







 Substance  Category  Reaction  Severity  Reaction  Status  Date  Comments  
Source



         type    Reported    



                 



                 



                 

 

 Rythmol  Assertion      Drug  Active      



         allergy        [place]



                 



                 



                 







Immunizations







 Immunization  Date Given  Site  Status  Last Updated  Comments  Source



             



             







Results







 Order  Results  Value  Reference  Date  Interpretation  Comments  Source



 Name      Range        



               



               







Vital Signs







 Vital Sign  Value  Date  Comments  Source



         

 

 Heart Rate  61  2017    [facility]



         



         

 

 Respitory Rate  18  2017    [facility]



         



         

 

 Temperature Oral (F)  98.2 F  2017    [facility]



         



         

 

 Systolic (mm Hg)  132  2017    [facility]



         



         

 

 Diastolic (mm Hg)  76  2017    [facility]



         



         

 

 Systolic (mm Hg)  142  2017    [facility]



         



         

 

 Diastolic (mm Hg)  72  2017    [facility]



         



         

 

 Respitory Rate  18  2017    [facility]



         



         

 

 Heart Rate  64  2017    [facility]



         



         

 

 Temperature Oral (F)  98.2 F  2017    [facility]



         



         

 

 Heart Rate  62  2017    [facility]



         



         

 

 Systolic (mm Hg)  152  2017    [facility]



         



         

 

 Diastolic (mm Hg)  68  2017    [facility]



         



         

 

 Respitory Rate  18  2017    [facility]



         



         

 

 BMI Calculated  29.11  2017    [facility]



         



         

 

 Height  167.64 cm  2017    [facility]



         



         

 

 Weight  81.818  2017    [facility]



         



         

 

 Temperature Oral (F)  98.0 F  2017    [facility]



         



         







Encounters







 Location  Location  Encounter  Encounter  Reason  Attending  ADM  DC  Status  
Source



   Details  Type  Number  For  Provider  Date  Date    



         Visit          



                   



                   



                   

 

 [facility]  [id number]    Ganesh      MARILUZ Braxton  /2017    [facility]                  



                   



                   







Procedures







 Procedure  Code  Date  Perfomer  Comments  Source



           



           

 

 Hip arthroplasty  [id number]        [facility]

## 2018-09-28 NOTE — RAD REPORT
EXAM DESCRIPTION:  RAD - Abdomen 1 View (KUB) - 9/28/2018 1:22 pm

 

CLINICAL HISTORY:  Abd pain;Abdominal distention

Pain

 

COMPARISON:  No comparisons

 

FINDINGS:  The bowel gas pattern is non-obstructive. No evidence of free air or pneumatosis. No suspi
cious calcifications.

 

Hardware is present in the proximal left humerus.

 

Contrast is present in the stomach.

 

IMPRESSION:  Negative examination.

## 2018-09-28 NOTE — RAD REPORT
EXAM DESCRIPTION:  CT - Abdomen   Pelvis Wo Contrast - 9/28/2018 2:19 pm

 

CLINICAL HISTORY:  Abdominal pain.

Abd pain;Abdominal distention

 

COMPARISON:  Abdomen   Pelvis W Contrast dated 10/11/2017

 

TECHNIQUE:  CT imaging of the abdomen and pelvis was performed without contrast. Solid organ, bowel a
nd vascular assessment is limited due to lack of IV and oral contrast.

 

All CT scans are performed using dose optimization technique as appropriate and may include automated
 exposure control or mA/KV adjustment according to patient size.

 

FINDINGS:  Small pleural effusions are present.Cardiomegaly is noted with pacer wires in place.

 

Mild free fluid is seen in the abdomen. Mild splenomegaly is seen. Heavy atherosclerosis. No liver le
crystal or biliary dilatation. Pancreatic atrophy is identified. 3 cm fat containing left adrenal mass i
s unchanged. Right adrenal gland is unremarkable. Mild atrophy of both kidneys is seen. Prominent 3 c
m cyst projects off the inferior left kidney.

 

Mild pelvic free fluid.  Heavy atherosclerosis.

 

No fracture present.

 

IMPRESSION:  Mild ascites and small pleural effusions.

 

Heavy atherosclerosis.

 

A limited non-contrast examination was performed as detailed.

## 2018-09-29 LAB
ALBUMIN SERPL BCP-MCNC: 2.7 G/DL (ref 3.4–5)
ALP SERPL-CCNC: 114 U/L (ref 45–117)
ALT SERPL W P-5'-P-CCNC: 13 U/L (ref 12–78)
ANISOCYTOSIS BLD QL: (no result)
AST SERPL W P-5'-P-CCNC: 11 U/L (ref 15–37)
BLD SMEAR INTERP: (no result)
BUN BLD-MCNC: 45 MG/DL (ref 7–18)
GLUCOSE SERPLBLD-MCNC: 59 MG/DL (ref 74–106)
HCT VFR BLD CALC: 33.1 % (ref 36–45)
LYMPHOCYTES # SPEC AUTO: 1.2 K/UL (ref 0.7–4.9)
MCH RBC QN AUTO: 26.2 PG (ref 27–35)
MCV RBC: 80.1 FL (ref 80–100)
MORPHOLOGY BLD-IMP: (no result)
OVALOCYTES BLD QL SMEAR: (no result)
PMV BLD: 8 FL (ref 7.6–11.3)
POTASSIUM SERPL-SCNC: 4.5 MMOL/L (ref 3.5–5.1)
RBC # BLD: 4.14 M/UL (ref 3.86–4.86)

## 2018-09-29 RX ADMIN — PANTOPRAZOLE SODIUM SCH MG: 40 TABLET, DELAYED RELEASE ORAL at 11:14

## 2018-09-29 RX ADMIN — DOCUSATE SODIUM SCH MG: 100 CAPSULE, LIQUID FILLED ORAL at 11:15

## 2018-09-29 RX ADMIN — CARVEDILOL SCH MG: 6.25 TABLET, FILM COATED ORAL at 11:15

## 2018-09-29 RX ADMIN — ALBUMIN (HUMAN) SCH MG: 5 SOLUTION INTRAVENOUS at 17:51

## 2018-09-29 RX ADMIN — CARVEDILOL SCH MG: 6.25 TABLET, FILM COATED ORAL at 22:43

## 2018-09-29 RX ADMIN — HUMAN INSULIN SCH: 100 INJECTION, SOLUTION SUBCUTANEOUS at 11:30

## 2018-09-29 RX ADMIN — DOCUSATE SODIUM SCH: 100 CAPSULE, LIQUID FILLED ORAL at 21:00

## 2018-09-29 RX ADMIN — Medication SCH ML: at 11:15

## 2018-09-29 RX ADMIN — INSULIN GLARGINE SCH UNITS: 100 INJECTION, SOLUTION SUBCUTANEOUS at 22:44

## 2018-09-29 RX ADMIN — HUMAN INSULIN SCH UNIT: 100 INJECTION, SOLUTION SUBCUTANEOUS at 22:45

## 2018-09-29 RX ADMIN — APIXABAN SCH MG: 2.5 TABLET, FILM COATED ORAL at 11:14

## 2018-09-29 RX ADMIN — SERTRALINE HYDROCHLORIDE SCH MG: 50 TABLET ORAL at 11:15

## 2018-09-29 RX ADMIN — ALBUMIN (HUMAN) SCH MG: 5 SOLUTION INTRAVENOUS at 11:14

## 2018-09-29 RX ADMIN — AMIODARONE HYDROCHLORIDE SCH MG: 200 TABLET ORAL at 11:15

## 2018-09-29 RX ADMIN — ALPRAZOLAM SCH MG: 0.5 TABLET ORAL at 22:43

## 2018-09-29 RX ADMIN — Medication SCH ML: at 22:41

## 2018-09-29 RX ADMIN — GABAPENTIN SCH MG: 100 CAPSULE ORAL at 22:42

## 2018-09-29 RX ADMIN — GABAPENTIN SCH MG: 100 CAPSULE ORAL at 11:15

## 2018-09-29 RX ADMIN — APIXABAN SCH MG: 2.5 TABLET, FILM COATED ORAL at 22:42

## 2018-09-29 RX ADMIN — APIXABAN SCH: 2.5 TABLET, FILM COATED ORAL at 09:00

## 2018-09-29 RX ADMIN — ATORVASTATIN CALCIUM SCH MG: 40 TABLET, FILM COATED ORAL at 11:14

## 2018-09-29 RX ADMIN — HUMAN INSULIN SCH: 100 INJECTION, SOLUTION SUBCUTANEOUS at 07:30

## 2018-09-29 RX ADMIN — HUMAN INSULIN SCH: 100 INJECTION, SOLUTION SUBCUTANEOUS at 16:30

## 2018-09-29 NOTE — HP
Date of Admission:  09/28/2018



Primary Care Physician:  Dr. Gay.



Chief Complaint:  Shortness of breath, abdominal distention.



Code Status:  Full.  Daughter is decision-maker and the patient does have a living will.



History Of Present Illness:  The patient is an 83-year-old female with past medical history of atrial
 fibrillation, on Eliquis; history of lung cancer on the right side; congestive heart failure; obesit
y; diabetes; history of DVT; hyperlipidemia; kidney disease.  The patient was recently discharged fro
 the hospital on 09/14/2018 with issues of shortness of breath.  The patient was doing well after go
ing home, however, had been gained approximately 20 pounds and was started on metolazone by her PCP, 
however, was only able to take a couple of doses.  The patient today had worsening symptoms of shortn
ess of breath, abdominal distention and therefore, was sent in to the ER by her home health nurse for
 further evaluation.  The patient came into the ER, vital signs showed some mild tachypnea, she was s
aturating 96% on room air.  The patient does use oxygen at home.  The patient was given Lasix.  Her w
orkup revealed a BNP of 24,000, creatinine was 1.9.  Glucose is elevated at 396.  CT scan of the abdo
men and pelvis showed some mild ascites and small pleural effusions, heavy atherosclerosis and chest 
x-ray was showing a triangular opacity noted in the right lung, which did not change from previous.  
Did show some mild pulmonary edema.  Pacer device in place.  The patient was then referred for admiss
ion for acute congestive heart failure.  When seen in the ER, she was awake, alert, oriented x3, in s
ome mild distress.  Elderly female who is somewhat ill-appearing.



Past Medical History:  Congestive heart failure; atrial fibrillation, anticoagulation with Eliquis; o
besity; diabetes mellitus type 2, non-insulin requiring; history DVT; hyperlipidemia; chronic kidney 
disease; depression; anxiety; hypertension.



Surgical History:  Pacemaker, cataract surgery, right leg fracture and repair.



Family History:  Father had heart disease and diabetes.



Social History:  The patient denies alcohol use, tobacco use, or illicit drug use.  Good social suppo
rt.



Allergies:  TO AMITRIPTYLINE, ROSIGLITAZONE, SITAGLIPTIN, PROPAFENONE, AND TAPE.



Medications:  List reviewed.



Physical Examination:

Vital Signs:  Blood pressure 149/91, pulse 70, respirations 21, temperature 97.9, O2 96% on room air.
 

General:  Awake, alert, oriented x3.  Elderly female with mild respiratory distress. 

HEENT:  Normocephalic, atraumatic.  PERRLA, EOMI.  Dry mucous membranes.  Oropharynx is clear.  Poor 
dentition.  Conjunctivae anicteric. 

Neck:  Supple.  Trachea midline.  Distended JVD. 

CV:  S1, S2.  No murmurs.  Peripheral pulses present, but weak. 

Respiratory:  Diminished breath sounds bilaterally.  Some rhonchi and crackles heard.  No wheezing.  
The patient is slightly tachypneic.  No use of accessory muscles. 

Gastrointestinal:  Abdomen is mildly distended, nontender.  Mild ascites.  Bowel sounds positive. 

Extremities:  No clubbing, cyanosis.  The patient has 2+ peripheral edema bilaterally. 

Neuro:  Cranial nerves 2 through 12 intact grossly.  No focal neurological deficit. 

Skin:  No rashes.  Normal skin turgor.  The patient has chronic venous stasis changes. 

Psych:  Mood is okay.  Affect is full.  Insight and judgment are good.



Laboratory Data:  Sodium 136, potassium 5.1, chloride 103, CO2 29, BUN 49, creatinine 1.9, glucose 39
6, calcium 8.3, BNP 24,962.  WBC 4.1, H and H 11.2 and 35.8, platelets 189, neutrophils 64%.  Chest x
-ray shows chronic triangular opacity noted in the right lung, unchanged.  Does demonstrate mild inte
rstitial primary edema.  Heart significantly prominent in size with dual lead pacemaker device.  Gerard
droid lesion in the proximal left humerus is unchanged.  CT scan abdomen and pelvis without contrast 
shows mild ascites and small pleural effusions, heavy atherosclerosis, 3 cm fat containing left adren
al mass unchanged, 3 cm cyst projects off the inferior left kidney.



Assessment And Plan:  An 83-year-old female with:

1.Shortness of breath.

2.Mild ascites.

3.Acute on chronic congestive heart failure with acute exacerbation, systolic dysfunction, EF was 44
%.

4.Echocardiogram done recently on 09/12/2018, shows EF of 29%.

5.Moderate pulmonary hypertension.

6.Atrial fibrillation, controlled ventricular rate.  We will continue Eliquis.

7.History of deep venous thrombosis, on Eliquis.

8.Diabetes mellitus type 2, non-insulin requiring with hyperglycemia, uncontrolled.  We will place o
n sliding scale insulin.

9.Essential hypertension, stable.  We will resume home medications as appropriate.

10.Hyperlipidemia.  Continue statin.

11.Chronic kidney disease stage 3.  We will continue to monitor creatinine.

12.Depression and anxiety.

13.Status post pacemaker.



Plan:  We will admit patient to Med-Surg, place as inpatient.  Continue supplemental oxygen.  We will
 continue with diuresis with Lasix.  Monitor creatinine.  Lasix dose may need to be decreased.  We wi
ll monitor I's and O's.  Fluid restriction.  Daily weight.  Abdominal ultrasound in a.m. to rule out 
other pathology.  Cardiology has been consulted.  We will hold off on echocardiogram most recent one 
on 09/12/2018 shows low EF of 29%.  The patient unfortunately may not be a candidate for Entresto giv
en her kidney function.  We will discuss with Cardiology.  May benefit from Aldactone.





/MODL

DD:  09/28/2018 17:52:42Voice ID:  [id number]

## 2018-09-29 NOTE — CON
Additional Attending Physician:  Dr. Sneed.



Chief Complaint:  Swelling of the abdomen, weight gain, and shortness of breath.



History Of Present Illness:  Ms. Milian is known to have a normal ejection fraction, but she was in [facility] AFib and has a pacemaker.  She had been in chronic atrial fibrillation, underwent an ablation, 
and now manages to be in sinus rhythm much of the time.  Her pacemaker has been checked recently.  It
 does not seem to be an issue.  She has had normal ejection fraction recently.  She had lung cancer, 
had the spot resected.  It was considered to be a complete cure.  One of the findings since being her
e is that although her chest x-ray does not show pulmonary edema, it does show a little triangular-sh
aped mass or probably scar from surgery.  In her CT of the abdomen and pelvis, there is ascites.  The
re was no contrast given, so that it would be less likely to detect masses, but there is a small amou
nt of ascites, pancreatic atrophy, not a mass.  No liver masses are noted, although it could be a les
s sensitive test without the use of contrast.  The patient feels much better since she has had diures
is and presently she is getting amiodarone, apixaban 2.5 b.i.d., atorvastatin, carvedilol, Lasix 40 b
.i.d. IV, gabapentin, insulin, Zofran, and sertraline.



Impression:  The patient's diuresis is going well.  We can look at her heart again, see if the heart 
is the main issue.  It could be that the heart is causing hepatic congestion and ascites, but I would
 be concerned that a GI doctor might be involved to see if they think we should try to sample some of
 the fluid, so it would be complete 

feasible.  I would be worried about malignancy in the abdomen causing that fluid collection.





ADIEL/NATIVIDAD

DD:  09/29/2018 10:38:45Voice ID:  [id number]

DT:  09/29/2018 13:30:52Report ID:  [id number]

## 2018-09-29 NOTE — P.PN
Subjective


Date of Service: 09/29/18





doing better less edema





Physical Examination





- Vital Signs


Temperature: 97.4 F


Blood Pressure: 136/73


Pulse: 70


Respirations: 20


Pulse Ox (%): 98





- Physical Exam


General: Alert, In no apparent distress


HEENT: Atraumatic, PERRLA, EOMI


Neck: Supple, JVD not distended


Respiratory: Clear to auscultation bilaterally, Normal air movement


Cardiovascular: Regular rate/rhythm, Normal S1 S2


Gastrointestinal: Normal bowel sounds, No tenderness


Musculoskeletal: No tenderness


Integumentary: No rashes


Neurological: Normal speech, Normal tone, Normal affect


Lymphatics: No axilla or inguinal lymphadenopathy





- Studies


Medications List Reviewed: Yes





Assessment And Plan





- Current Problems (Diagnosis)


(1) CHF (congestive heart failure)


Onset Date: 05/15/15   Current Visit: Yes   Status: Chronic   


Qualifiers: 


   Heart failure type: systolic   Heart failure chronicity: acute on chronic   

Qualified Code(s): I50.23 - Acute on chronic systolic (congestive) heart 

failure   





(2) Atrial fibrillation with controlled ventricular response


Onset Date: 09/17/18   Current Visit: Yes   Status: Chronic   





(3) Depression with anxiety


Onset Date: 09/17/18   Current Visit: Yes   Status: Chronic   





(4) CKD (chronic kidney disease)


Onset Date: 09/08/15   Current Visit: No   Status: Chronic   


Qualifiers: 


   Chronic kidney disease stage: stage 3 (moderate)   Qualified Code(s): N18.3 

- Chronic kidney disease, stage 3 (moderate)   





(5) Hypertension


Onset Date: 10/12/17   Current Visit: Yes   Status: Chronic   


Qualifiers: 


   Hypertension type: essential hypertension 





(6) Type 2 diabetes mellitus


Onset Date: 10/12/17   Current Visit: No   Status: Chronic   


Qualifiers: 


   Diabetes mellitus long term insulin use: with long term use   Diabetes 

mellitus complication status: without complication   Qualified Code(s): E11.9 - 

Type 2 diabetes mellitus without complications; Z79.4 - Long term (current) use 

of insulin   





- Plan





cont diuresis


will do CT abd with IV contrast if renal function is better tommorrow

## 2018-09-30 LAB
ALBUMIN SERPL BCP-MCNC: 2.5 G/DL (ref 3.4–5)
ALP SERPL-CCNC: 108 U/L (ref 45–117)
ALT SERPL W P-5'-P-CCNC: 11 U/L (ref 12–78)
AST SERPL W P-5'-P-CCNC: 11 U/L (ref 15–37)
BUN BLD-MCNC: 50 MG/DL (ref 7–18)
GLUCOSE SERPLBLD-MCNC: 140 MG/DL (ref 74–106)
HCT VFR BLD CALC: 31.3 % (ref 36–45)
MCH RBC QN AUTO: 25.9 PG (ref 27–35)
MCV RBC: 81.4 FL (ref 80–100)
PMV BLD: 8.4 FL (ref 7.6–11.3)
POTASSIUM SERPL-SCNC: 4.8 MMOL/L (ref 3.5–5.1)
RBC # BLD: 3.84 M/UL (ref 3.86–4.86)

## 2018-09-30 RX ADMIN — GABAPENTIN SCH MG: 100 CAPSULE ORAL at 08:56

## 2018-09-30 RX ADMIN — DOCUSATE SODIUM SCH: 100 CAPSULE, LIQUID FILLED ORAL at 20:10

## 2018-09-30 RX ADMIN — ALPRAZOLAM SCH MG: 0.5 TABLET ORAL at 20:09

## 2018-09-30 RX ADMIN — CARVEDILOL SCH MG: 6.25 TABLET, FILM COATED ORAL at 08:57

## 2018-09-30 RX ADMIN — HUMAN INSULIN SCH: 100 INJECTION, SOLUTION SUBCUTANEOUS at 16:30

## 2018-09-30 RX ADMIN — AMIODARONE HYDROCHLORIDE SCH MG: 200 TABLET ORAL at 08:57

## 2018-09-30 RX ADMIN — GABAPENTIN SCH MG: 100 CAPSULE ORAL at 20:09

## 2018-09-30 RX ADMIN — PANTOPRAZOLE SODIUM SCH MG: 40 TABLET, DELAYED RELEASE ORAL at 08:57

## 2018-09-30 RX ADMIN — HUMAN INSULIN SCH: 100 INJECTION, SOLUTION SUBCUTANEOUS at 07:30

## 2018-09-30 RX ADMIN — Medication SCH ML: at 20:09

## 2018-09-30 RX ADMIN — INSULIN GLARGINE SCH UNITS: 100 INJECTION, SOLUTION SUBCUTANEOUS at 20:24

## 2018-09-30 RX ADMIN — SERTRALINE HYDROCHLORIDE SCH MG: 50 TABLET ORAL at 08:57

## 2018-09-30 RX ADMIN — APIXABAN SCH MG: 2.5 TABLET, FILM COATED ORAL at 08:56

## 2018-09-30 RX ADMIN — Medication SCH ML: at 08:58

## 2018-09-30 RX ADMIN — APIXABAN SCH MG: 2.5 TABLET, FILM COATED ORAL at 20:09

## 2018-09-30 RX ADMIN — HUMAN INSULIN SCH UNIT: 100 INJECTION, SOLUTION SUBCUTANEOUS at 20:24

## 2018-09-30 RX ADMIN — DOCUSATE SODIUM SCH: 100 CAPSULE, LIQUID FILLED ORAL at 08:58

## 2018-09-30 RX ADMIN — ATORVASTATIN CALCIUM SCH MG: 40 TABLET, FILM COATED ORAL at 08:56

## 2018-09-30 RX ADMIN — ALBUMIN (HUMAN) SCH MG: 5 SOLUTION INTRAVENOUS at 08:57

## 2018-09-30 RX ADMIN — CARVEDILOL SCH MG: 6.25 TABLET, FILM COATED ORAL at 20:09

## 2018-09-30 RX ADMIN — HUMAN INSULIN SCH UNIT: 100 INJECTION, SOLUTION SUBCUTANEOUS at 12:07

## 2018-09-30 NOTE — P.PN
Subjective


Date of Service: 09/30/18





doing much better today, with less edema





Physical Examination





- Vital Signs


Temperature: 97.3 F


Blood Pressure: 115/58


Pulse: 70


Respirations: 20


Pulse Ox (%): 98





- Physical Exam


General: Alert, In no apparent distress


HEENT: Atraumatic, PERRLA, EOMI


Neck: Supple, JVD not distended


Respiratory: Clear to auscultation bilaterally, Normal air movement


Cardiovascular: Regular rate/rhythm, Normal S1 S2


Gastrointestinal: Normal bowel sounds, No tenderness


Musculoskeletal: No tenderness


Integumentary: No rashes


Neurological: Normal speech, Normal tone, Normal affect


Lymphatics: No axilla or inguinal lymphadenopathy





- Studies


Medications List Reviewed: Yes





Assessment And Plan





- Current Problems (Diagnosis)


(1) CHF (congestive heart failure)


Onset Date: 05/15/15   Current Visit: Yes   Status: Chronic   


Qualifiers: 


   Heart failure type: systolic   Heart failure chronicity: acute on chronic   

Qualified Code(s): I50.23 - Acute on chronic systolic (congestive) heart 

failure   





(2) Atrial fibrillation with controlled ventricular response


Onset Date: 09/17/18   Current Visit: Yes   Status: Chronic   





(3) Depression with anxiety


Onset Date: 09/17/18   Current Visit: Yes   Status: Chronic   





(4) CKD (chronic kidney disease)


Onset Date: 09/08/15   Current Visit: No   Status: Chronic   


Qualifiers: 


   Chronic kidney disease stage: stage 3 (moderate)   Qualified Code(s): N18.3 

- Chronic kidney disease, stage 3 (moderate)   





(5) Hypertension


Onset Date: 10/12/17   Current Visit: Yes   Status: Chronic   


Qualifiers: 


   Hypertension type: essential hypertension 





(6) Type 2 diabetes mellitus


Onset Date: 10/12/17   Current Visit: No   Status: Chronic   


Qualifiers: 


   Diabetes mellitus long term insulin use: with long term use   Diabetes 

mellitus complication status: without complication   Qualified Code(s): E11.9 - 

Type 2 diabetes mellitus without complications; Z79.4 - Long term (current) use 

of insulin   





- Plan





--cont diuresis Lasix IV


--Consulted Dr Norris


--DC home tomorrow after more diuresis

## 2018-09-30 NOTE — PN
Subjective:  Ms. Milian seems to have diuresed significantly.  I think we could probably cut back on t
he amount of diuretic.  I think she needs to do an echocardiogram tomorrow and see where we stand.





SARAH

DD:  09/30/2018 11:32:05Voice ID:  [id number]

DT:  09/30/2018 16:01:14Report ID:  [id number]

## 2018-10-01 VITALS — OXYGEN SATURATION: 97 %

## 2018-10-01 VITALS — SYSTOLIC BLOOD PRESSURE: 129 MMHG | DIASTOLIC BLOOD PRESSURE: 70 MMHG | TEMPERATURE: 97.7 F

## 2018-10-01 LAB
ALBUMIN SERPL BCP-MCNC: 2.7 G/DL (ref 3.4–5)
ALP SERPL-CCNC: 108 U/L (ref 45–117)
ALT SERPL W P-5'-P-CCNC: 12 U/L (ref 12–78)
AST SERPL W P-5'-P-CCNC: 9 U/L (ref 15–37)
BUN BLD-MCNC: 50 MG/DL (ref 7–18)
GLUCOSE SERPLBLD-MCNC: 124 MG/DL (ref 74–106)
POTASSIUM SERPL-SCNC: 4.7 MMOL/L (ref 3.5–5.1)

## 2018-10-01 RX ADMIN — ATORVASTATIN CALCIUM SCH MG: 40 TABLET, FILM COATED ORAL at 08:12

## 2018-10-01 RX ADMIN — APIXABAN SCH MG: 2.5 TABLET, FILM COATED ORAL at 08:12

## 2018-10-01 RX ADMIN — PANTOPRAZOLE SODIUM SCH MG: 40 TABLET, DELAYED RELEASE ORAL at 08:13

## 2018-10-01 RX ADMIN — DOCUSATE SODIUM SCH MG: 100 CAPSULE, LIQUID FILLED ORAL at 08:12

## 2018-10-01 RX ADMIN — Medication SCH ML: at 08:13

## 2018-10-01 RX ADMIN — HUMAN INSULIN SCH: 100 INJECTION, SOLUTION SUBCUTANEOUS at 07:30

## 2018-10-01 RX ADMIN — AMIODARONE HYDROCHLORIDE SCH MG: 200 TABLET ORAL at 08:13

## 2018-10-01 RX ADMIN — CARVEDILOL SCH MG: 6.25 TABLET, FILM COATED ORAL at 08:12

## 2018-10-01 RX ADMIN — GABAPENTIN SCH MG: 100 CAPSULE ORAL at 08:12

## 2018-10-01 RX ADMIN — SERTRALINE HYDROCHLORIDE SCH MG: 50 TABLET ORAL at 08:12

## 2018-10-01 RX ADMIN — HUMAN INSULIN SCH: 100 INJECTION, SOLUTION SUBCUTANEOUS at 11:30

## 2018-10-01 RX ADMIN — HUMAN INSULIN SCH: 100 INJECTION, SOLUTION SUBCUTANEOUS at 16:30

## 2018-10-01 NOTE — EKG
Test Date:    2018-10-01               Test Time:    12:37:24

Technician:   ARY                                     

                                                     

MEASUREMENT RESULTS:                                       

Intervals:                                           

Rate:         70                                     

NC:                                                  

QRSD:         198                                    

QT:           480                                    

QTc:          518                                    

Axis:                                                

P:                                                   

NC:                                                  

QRS:          -63                                    

T:            118                                    

                                                     

INTERPRETIVE STATEMENTS:                                       

                                                     

Rhythm consistent with VVI pacing

Abnormal ECG

Compared to ECG 09/11/2018 12:51:32

no significant change from previous ECG

Electronically Signed On 10-01-18 15:34:08 CDT by Nimesh Norris

## 2018-10-01 NOTE — P.DS
Admission Date: 09/28/18


Discharge Date: 10/01/18


Primary Care Provider: Dr. Gay; Cardiology-Dr. Norris


Disposition: DC HOME/HOME HEALTH CARE


Discharge Condition: GOOD


Reason for Admission: Shortness of breath, edema


Consultations: 





Cardiology-Dr. Norris


Procedures: 





ECHO: 


EF-35%


LEFT VENTRICULAR WALL MOTION:     GLOBAL HYPOKINESIS.


DOPPLER/COLOR FLOW:     MODERATE TRICUSPID REGURGITATION, ESTIMATED RIGHT 

VENTRICULAR SYSTOLIC PRESSURE 60-65mmHg  (SEVERE PULMONARY HYPERTENSION). MILD 

AORTIC AND MITRAL REGURGITATION. NO AORTIC STENOSIS.


COMMENTS:      DEPRESSED LEFT VENTRICULAR EJECTION FRACTION. LEFT VENTRICULAR 

HYPERTROPHY. DILATED LEFT AND RIGHT ATRIUM. PACEMAKER IN RIGHT VENTRICLE. 

AORTIC SCLEROSIS WITH NO AORTIC STENOSIS. MILD AORTIC AND MITRAL REGURGITATION. 

MODERATE TRICUSPID REGURGITATION. SEVERE PULMONARY HYPERTENSION. MITRAL ANNULAR 

CALCIFICATION.





CT Scan:


COMPARISON:  Abdomen   Pelvis W Contrast dated 10/11/2017 


TECHNIQUE:  CT imaging of the abdomen and pelvis was performed without 

contrast. Solid organ, bowel and vascular assessment is limited due to lack of 

IV and oral contrast. 


All CT scans are performed using dose optimization technique as appropriate and 

may include automated exposure control or mA/KV adjustment according to patient 

size. 


FINDINGS:  Small pleural effusions are present.Cardiomegaly is noted with pacer 

wires in place. 


Mild free fluid is seen in the abdomen. Mild splenomegaly is seen. Heavy 

atherosclerosis. No liver lesion or biliary dilatation. Pancreatic atrophy is 

identified. 3 cm fat containing left adrenal mass is unchanged. Right adrenal 

gland is unremarkable. Mild atrophy of both kidneys is seen. Prominent 3 cm 

cyst projects off the inferior left kidney. 


Mild pelvic free fluid.  Heavy atherosclerosis. 


No fracture present. 


IMPRESSION:  Mild ascites and small pleural effusions. 


Heavy atherosclerosis.


 


Medical Problem List: 


Edema, shortness of breath secondary to acute on chronic systolic congestive 

heart failure with ejection fraction 35%.


Mild ascites with small pleural effusions likely related to CHF


Chronic atrial fibrillation not on chronic anti coagulation therapy-Eliquis


History of pacemaker


Hyperlipidemia


Hypertension


Diabetes mellitus type 2 insulin-dependent


Depression with anxiety


Diabetic neuropathy


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


Chronic renal disease, stage 3


History of lung cancer


3 cm left adrenal mass unchanged


Left 3 cm kidney cyst


Anemia of chronic disease


Brief History of Present Illness: 





83-year-old  female presented to the emergency room with edema and 

shortness of breath.  Patient found to be in acute on chronic systolic CHF.  

Patient admitted for treatment.


Hospital Course: 








Patient presented with edema and shortness of breath secondary to acute on 

chronic systolic congestive heart failure with ejection fraction about 35%.  

Patient was treated during her stay.  Patient evaluated by Cardiology.  

Echocardiogram shows ejection fraction 35%.  Medications were adjusted.  Lasix 

was increased and Metalozone was discontinued.  Patient responded to therapy 

well.  Patient found to have mild ascites and pleural effusions.  This 

improved.  Recommendation is to recheck chest x-ray in 2-4 weeks to monitor 

progress.  If ascites persists recommendation is for further evaluation with 

GI.  At discharge she will continue with Lasix 80 mg 1 pill twice daily.  

Metolazone was discontinued She is to continue with a 1500 cc per day fluid 

restriction and low-salt diet.  She will continue with carvedilol 3.125 1 pill 

twice daily.   Recommendation is for the patient to follow up with cardiology 

in 1 week to continue to monitor her care.  Education on CHF will be provided.  

Recommendation to recheck lab-CBC and BMP in 1 week to monitor progress.  Prior 

to discharge home health will be provided for education and monitoring of CHF.





Patient has Chronic renal disease, stage 3.  This remained stable during the 

course of her stay.  Recommendation is to recheck lab-BMP in 1 week to monitor 

her progress.  Recommendation on no further use of nonsteroidal anti-

inflammatories is recommended.  Future medications will need to be renally 

dosed.  Recommendation is to establish care with Nephrology to further monitor.





Patient has Chronic atrial fibrillation on chronic anti coagulation therapy-

Eliquis.   Patient will continue with Eliquis 2.5 mg 1 pill twice daily.  

Patient will continue with carvedilol 3.125 mg 1 pill twice daily.  Patient 

will also continue with amiodarone 200 mg daily





Patient has hypertension.  Medications adjusted during her stay.  Carvedilol 

was decreased.  At discharge Patient will continue with carvedilol 3.125 mg mg 

1 pill twice daily.  Recommendation is to maintain blood pressures less 150/80.

  Further adjustment can be done by her PCP.  Medications may need to be held 

if blood pressure less than 100 systolic.





Patient has Hyperlipidemia.  At discharge she will continue with Lipitor 40 mg 

1 pill daily.





Patient has Diabetes mellitus type 2 insulin dependent.  At discharge she will 

continue with Lantus 30 units subcu daily. Recommendation is to maintain blood 

sugars less 140 fasting and less than 200 after meals.  Further adjustment can 

be done by her PCP.  Education on hypoglycemia will be provided.





Patient has diabetic neuropathy.  She will continue with Neurontin 100 mg 1 

pill twice daily.





Patient has Depression with anxiety.  Patient will continue with Zoloft 50 mg 1 

pill daily and Xanax 1 mg at night.





Patient likely has GERD.  Patient will continue with Prevacid 30 mg 1 pill once 

daily at discharge.





Patient has anemia of chronic disease. Recommendation to recheck lab-CBC in 1 

week to monitor progress.


Vital Signs/Physical Exam: 














Temp Pulse Resp BP Pulse Ox


 


 97.7 F   71   18   129/70   95 


 


 10/01/18 12:00  10/01/18 12:00  10/01/18 12:00  10/01/18 12:00  10/01/18 12:00








General: Alert, In no apparent distress, Oriented x3, Cooperative


HEENT: Atraumatic


Neck: Supple


Respiratory: Clear to auscultation bilaterally, Normal air movement


Cardiovascular: Normal pulses, Regular rate/rhythm


Gastrointestinal: Normal bowel sounds, Soft and benign, Non-distended, No 

tenderness, No masses, No rebound, No guarding


Musculoskeletal: No erythema, No tenderness, No warmth


Integumentary: No tenderness/swelling, No erythema, No warmth, No cyanosis


Neurological: Normal speech, Normal strength at 5/5 x4 extr, Normal tone, 

Normal affect


Laboratory Data at Discharge: 














WBC  3.4 K/uL (4.3-10.9)  L D 09/30/18  04:49    


 


Hgb  10.0 g/dL (12.0-15.0)  L  09/30/18  04:49    


 


Hct  31.3 % (36.0-45.0)  L  09/30/18  04:49    


 


Plt Count  161 K/uL (152-406)  D 09/30/18  04:49    


 


Sodium  141 mmol/L (136-145)   10/01/18  04:50    


 


Potassium  4.7 mmol/L (3.5-5.1)   10/01/18  04:50    


 


BUN  50 mg/dL (7-18)  H  10/01/18  04:50    


 


Creatinine  1.90 mg/dL (0.55-1.3)  H  10/01/18  04:50    


 


Glucose  124 mg/dL ()  H  10/01/18  04:50    


 


Total Bilirubin  0.5 mg/dL (0.2-1.0)   10/01/18  04:50    


 


AST  9 U/L (15-37)  L  10/01/18  04:50    


 


ALT  12 U/L (12-78)   10/01/18  04:50    


 


Alkaline Phosphatase  108 U/L ()   10/01/18  04:50    


 


Lipase  80 U/L ()   09/28/18  12:45    








Home Medications: 








ALPRAZolam [Xanax*] 1 tab PO BEDTIME 09/12/18 


Amiodarone HCl [Cordarone*] 200 mg PO DAILY 09/12/18 


Atorvastatin Calcium [Lipitor] 1 tab PO DAILY 09/12/18 


Docusate [Colace Cap*] 1 tab PO BID 09/12/18 


Gabapentin [Neurontin*] 100 mg PO BID 09/12/18 


Insulin Glargine,Hum.rec.anlog [Lantus] 30 unit SQ BEDTIME 09/12/18 


Lansoprazole [Prevacid] 1 cap PO DAILY 09/12/18 


Sertraline [Zoloft*] 50 mg PO DAILY 09/12/18 


Apixaban [Eliquis *] 2.5 mg PO BID #60 tablet 10/01/18 


Carvedilol [Coreg] 3.125 mg PO BID #60 tab 10/01/18 


Furosemide [Lasix] 80 mg PO DAILY #30 tablet 10/01/18 





New Medications: 


Apixaban [Eliquis *] 2.5 mg PO BID #60 tablet


Carvedilol [Coreg] 3.125 mg PO BID #60 tab


Furosemide [Lasix] 80 mg PO DAILY #30 tablet


Patient Discharge Instructions: 1.  Patient will need to follow up with PCP in 

1 week to follow up this hospitalization.  2.  Patient presented with edema and 

shortness of breath secondary to acute on chronic systolic congestive heart 

failure with ejection fraction about 35%.  Patient was treated during her stay.

  Patient evaluated by Cardiology.  Echocardiogram shows ejection fraction 35%.

  Medications were adjusted.  Lasix was increased and Metalozone was 

discontinued.  Patient responded to therapy well.  Patient found to have mild 

ascites and pleural effusions.  This improved.  Recommendation is to recheck 

chest x-ray in 2-4 weeks to monitor progress.  If ascites persists 

recommendation is for further evaluation with GI.  At discharge she will 

continue with Lasix 80 mg 1 pill twice daily.  Metolazone was discontinued. She 

is to continue with a 1500 cc per day fluid restriction and low-salt diet.  She 

will continue with carvedilol 3.125 1 pill twice daily.   Recommendation is for 

the patient to follow up with cardiology in 1 week to continue to monitor her 

care.  Education on CHF will be provided.  Recommendation to recheck lab-CBC 

and BMP in 1 week to monitor progress.  Prior to discharge home health will be 

provided for education and monitoring of CHF.  3.  Patient has Chronic renal 

disease, stage 3.  This remained stable during the course of her stay.  

Recommendation is to recheck lab-BMP in 1 week to monitor her progress.  

Recommendation on no further use of nonsteroidal anti-inflammatories is 

recommended.  Future medications will need to be renally dosed.  Recommendation 

is to establish care with Nephrology to further monitor.  4.  Patient has 

Chronic atrial fibrillation on chronic anti coagulation therapy-Eliquis.   

Patient will continue with Eliquis 2.5 mg 1 pill twice daily.  Patient will 

continue with carvedilol 3.125 mg 1 pill twice daily.  Patient will also 

continue with amiodarone 200 mg daily.  5.  Patient has hypertension.  

Medications adjusted during her stay.  Carvedilol was decreased.  At discharge 

Patient will continue with carvedilol 3.125 mg mg 1 pill twice daily.  

Recommendation is to maintain blood pressures less 150/80.  Further adjustment 

can be done by her PCP.  Medications may need to be held if blood pressure less 

than 100 systolic.  6.  Patient has Hyperlipidemia.  At discharge she will 

continue with Lipitor 40 mg 1 pill daily.  7.  Patient has Diabetes mellitus 

type 2 insulin dependent.  At discharge she will continue with Lantus 30 units 

subcu daily. Recommendation is to maintain blood sugars less 140 fasting and 

less than 200 after meals.  Further adjustment can be done by her PCP.  

Education on hypoglycemia will be provided.  8.  Patient has diabetic 

neuropathy.  She will continue with Neurontin 100 mg 1 pill twice daily.  9.  

Patient has Depression with anxiety.  Patient will continue with Zoloft 50 mg 1 

pill daily and Xanax 1 mg at night.  10.  Patient likely has GERD.  Patient 

will continue with Prevacid 30 mg 1 pill once daily at discharge.  11.  Patient 

has anemia of chronic disease. Recommendation to recheck lab-CBC in 1 week to 

monitor progress.


Diet: AHA


Activity: Fall precautions


Followup: 


Terry Gay MD [ACTIVE - CAN ADMIT] - 


Time spent managing pt's care (in minutes): 55

## 2018-10-01 NOTE — ECHO
HEIGHT: 4 ft 11 in   WEIGHT: 223 lb 4 oz   DATE OF STUDY: 10/01/18   REFER DR: Nimesh Norris MD

2-DIMENSIONAL: YES

     M.MODE: YES

 DOPPLER: YES

COLOR FLOW: YES



                    TDS:  NO

PORTABLE: NO

 DEFINITY:  NO

BUBBLE STUDY: NO





DIAGNOSIS:  CONGESTIVE HEART FAILURE



CARDIAC HISTORY:  

CATHERIZATION: NO

SURGERY: NO

PROSTHETIC VALVE: NO

PACEMAKER: YES





MEASUREMENTS (cm)

    DIASTOLIC (NORMALS)                 SYSTOLIC (NORMALS)

IVSd                 1.8 (0.6-1.2)                    LA Diam 4.0 (1.9-4.0)     LVEF       
  35%  

LVIDd               4.2 (3.5-5.7)                        LVIDs      3.5 (2.0-3.5)     %FS  
        16%

LVPWd             1.6 (0.6-1.2)

Ao Diam           2.9 (2.0-3.7)



2 DIMENSIONAL ASSESSMENT:

RIGHT ATRIUM:                   DILATED

LEFT ATRIUM:       DILATED



RIGHT VENTRICLE:            PACEMAKER CATHETER

LEFT VENTRICLE: LEFT VENTRICULAR HYPERTROPHY



TRICUSPID VALVE:             NORMAL

MITRAL VALVE:     MITRAL ANNULAR CALCIFICATION



PULMONIC VALVE:             NORMAL

AORTIC VALVE:     SCLEROSIS



PERICARDIAL EFFUSION: NONE

AORTIC ROOT:      NORMAL





LEFT VENTRICULAR WALL MOTION:     GLOBAL HYPOKINESIS.



DOPPLER/COLOR FLOW:     MODERATE TRICUSPID REGURGITATION, ESTIMATED RIGHT VENTRICULAR 
SYSTOLIC PRESSURE 60-65mmHg  (SEVERE PULMONARY HYPERTENSION). MILD AORTIC AND MITRAL 
REGURGITATION. NO AORTIC STENOSIS.



COMMENTS:      DEPRESSED LEFT VENTRICULAR EJECTION FRACTION. LEFT VENTRICULAR HYPERTROPHY. 
DILATED LEFT AND RIGHT ATRIUM. PACEMAKER IN RIGHT VENTRICLE. AORTIC SCLEROSIS WITH NO 
AORTIC STENOSIS. MILD AORTIC AND MITRAL REGURGITATION. MODERATE TRICUSPID REGURGITATION. 
SEVERE PULMONARY HYPERTENSION. MITRAL ANNULAR CALCIFICATION.



TECHNOLOGIST:   EARL BOOTH

## 2019-01-02 ENCOUNTER — HOSPITAL ENCOUNTER (INPATIENT)
Dept: HOSPITAL 97 - ER | Age: 84
LOS: 11 days | Discharge: HOSPICE | DRG: 291 | End: 2019-01-13
Attending: INTERNAL MEDICINE | Admitting: INTERNAL MEDICINE
Payer: COMMERCIAL

## 2019-01-02 VITALS — BODY MASS INDEX: 47.5 KG/M2

## 2019-01-02 DIAGNOSIS — E87.70: ICD-10-CM

## 2019-01-02 DIAGNOSIS — N18.5: ICD-10-CM

## 2019-01-02 DIAGNOSIS — E87.5: ICD-10-CM

## 2019-01-02 DIAGNOSIS — A41.9: ICD-10-CM

## 2019-01-02 DIAGNOSIS — I31.3: ICD-10-CM

## 2019-01-02 DIAGNOSIS — Z95.0: ICD-10-CM

## 2019-01-02 DIAGNOSIS — F41.9: ICD-10-CM

## 2019-01-02 DIAGNOSIS — R65.21: ICD-10-CM

## 2019-01-02 DIAGNOSIS — I13.0: Primary | ICD-10-CM

## 2019-01-02 DIAGNOSIS — E11.22: ICD-10-CM

## 2019-01-02 DIAGNOSIS — I95.9: ICD-10-CM

## 2019-01-02 DIAGNOSIS — L97.229: ICD-10-CM

## 2019-01-02 DIAGNOSIS — Z79.4: ICD-10-CM

## 2019-01-02 DIAGNOSIS — C34.91: ICD-10-CM

## 2019-01-02 DIAGNOSIS — G93.41: ICD-10-CM

## 2019-01-02 DIAGNOSIS — E05.80: ICD-10-CM

## 2019-01-02 DIAGNOSIS — Z66: ICD-10-CM

## 2019-01-02 DIAGNOSIS — J96.00: ICD-10-CM

## 2019-01-02 DIAGNOSIS — E78.5: ICD-10-CM

## 2019-01-02 DIAGNOSIS — Z86.718: ICD-10-CM

## 2019-01-02 DIAGNOSIS — F10.11: ICD-10-CM

## 2019-01-02 DIAGNOSIS — R00.1: ICD-10-CM

## 2019-01-02 DIAGNOSIS — I46.9: ICD-10-CM

## 2019-01-02 DIAGNOSIS — Z88.8: ICD-10-CM

## 2019-01-02 DIAGNOSIS — I50.43: ICD-10-CM

## 2019-01-02 DIAGNOSIS — I48.0: ICD-10-CM

## 2019-01-02 DIAGNOSIS — K74.60: ICD-10-CM

## 2019-01-02 DIAGNOSIS — B95.62: ICD-10-CM

## 2019-01-02 DIAGNOSIS — E44.1: ICD-10-CM

## 2019-01-02 DIAGNOSIS — K21.9: ICD-10-CM

## 2019-01-02 DIAGNOSIS — Z91.048: ICD-10-CM

## 2019-01-02 DIAGNOSIS — F32.9: ICD-10-CM

## 2019-01-02 DIAGNOSIS — N28.1: ICD-10-CM

## 2019-01-02 DIAGNOSIS — N17.0: ICD-10-CM

## 2019-01-02 DIAGNOSIS — J90: ICD-10-CM

## 2019-01-02 DIAGNOSIS — D17.79: ICD-10-CM

## 2019-01-02 LAB
ALBUMIN SERPL BCP-MCNC: 3.1 G/DL (ref 3.4–5)
ALP SERPL-CCNC: 157 U/L (ref 45–117)
ALT SERPL W P-5'-P-CCNC: 11 U/L (ref 12–78)
AST SERPL W P-5'-P-CCNC: 11 U/L (ref 15–37)
BUN BLD-MCNC: 36 MG/DL (ref 7–18)
BUN BLD-MCNC: 38 MG/DL (ref 7–18)
GLUCOSE SERPLBLD-MCNC: 173 MG/DL (ref 74–106)
GLUCOSE SERPLBLD-MCNC: 231 MG/DL (ref 74–106)
HCT VFR BLD CALC: 34.7 % (ref 36–45)
LIPASE SERPL-CCNC: 41 U/L (ref 73–393)
LYMPHOCYTES # SPEC AUTO: 0.8 K/UL (ref 0.7–4.9)
PMV BLD: 8.5 FL (ref 7.6–11.3)
POTASSIUM SERPL-SCNC: 5.1 MMOL/L (ref 3.5–5.1)
POTASSIUM SERPL-SCNC: 6.5 MMOL/L (ref 3.5–5.1)
RBC # BLD: 4.29 M/UL (ref 3.86–4.86)
UA COMPLETE W REFLEX CULTURE PNL UR: (no result)

## 2019-01-02 PROCEDURE — 82977 ASSAY OF GGT: CPT

## 2019-01-02 PROCEDURE — 80202 ASSAY OF VANCOMYCIN: CPT

## 2019-01-02 PROCEDURE — 84100 ASSAY OF PHOSPHORUS: CPT

## 2019-01-02 PROCEDURE — 93306 TTE W/DOPPLER COMPLETE: CPT

## 2019-01-02 PROCEDURE — 76377 3D RENDER W/INTRP POSTPROCES: CPT

## 2019-01-02 PROCEDURE — 70450 CT HEAD/BRAIN W/O DYE: CPT

## 2019-01-02 PROCEDURE — 97530 THERAPEUTIC ACTIVITIES: CPT

## 2019-01-02 PROCEDURE — 36415 COLL VENOUS BLD VENIPUNCTURE: CPT

## 2019-01-02 PROCEDURE — 87077 CULTURE AEROBIC IDENTIFY: CPT

## 2019-01-02 PROCEDURE — 84145 PROCALCITONIN (PCT): CPT

## 2019-01-02 PROCEDURE — 84443 ASSAY THYROID STIM HORMONE: CPT

## 2019-01-02 PROCEDURE — 99285 EMERGENCY DEPT VISIT HI MDM: CPT

## 2019-01-02 PROCEDURE — 87040 BLOOD CULTURE FOR BACTERIA: CPT

## 2019-01-02 PROCEDURE — 80048 BASIC METABOLIC PNL TOTAL CA: CPT

## 2019-01-02 PROCEDURE — 82570 ASSAY OF URINE CREATININE: CPT

## 2019-01-02 PROCEDURE — 96374 THER/PROPH/DIAG INJ IV PUSH: CPT

## 2019-01-02 PROCEDURE — 93005 ELECTROCARDIOGRAM TRACING: CPT

## 2019-01-02 PROCEDURE — 84484 ASSAY OF TROPONIN QUANT: CPT

## 2019-01-02 PROCEDURE — 85025 COMPLETE CBC W/AUTO DIFF WBC: CPT

## 2019-01-02 PROCEDURE — 51702 INSERT TEMP BLADDER CATH: CPT

## 2019-01-02 PROCEDURE — 94660 CPAP INITIATION&MGMT: CPT

## 2019-01-02 PROCEDURE — 71250 CT THORAX DX C-: CPT

## 2019-01-02 PROCEDURE — 84156 ASSAY OF PROTEIN URINE: CPT

## 2019-01-02 PROCEDURE — 97110 THERAPEUTIC EXERCISES: CPT

## 2019-01-02 PROCEDURE — 81003 URINALYSIS AUTO W/O SCOPE: CPT

## 2019-01-02 PROCEDURE — 87088 URINE BACTERIA CULTURE: CPT

## 2019-01-02 PROCEDURE — 83735 ASSAY OF MAGNESIUM: CPT

## 2019-01-02 PROCEDURE — 87070 CULTURE OTHR SPECIMN AEROBIC: CPT

## 2019-01-02 PROCEDURE — 81015 MICROSCOPIC EXAM OF URINE: CPT

## 2019-01-02 PROCEDURE — 74176 CT ABD & PELVIS W/O CONTRAST: CPT

## 2019-01-02 PROCEDURE — 82962 GLUCOSE BLOOD TEST: CPT

## 2019-01-02 PROCEDURE — 82553 CREATINE MB FRACTION: CPT

## 2019-01-02 PROCEDURE — 80076 HEPATIC FUNCTION PANEL: CPT

## 2019-01-02 PROCEDURE — 82805 BLOOD GASES W/O2 SATURATION: CPT

## 2019-01-02 PROCEDURE — 96361 HYDRATE IV INFUSION ADD-ON: CPT

## 2019-01-02 PROCEDURE — 87205 SMEAR GRAM STAIN: CPT

## 2019-01-02 PROCEDURE — 71045 X-RAY EXAM CHEST 1 VIEW: CPT

## 2019-01-02 PROCEDURE — 87186 SC STD MICRODIL/AGAR DIL: CPT

## 2019-01-02 PROCEDURE — 80053 COMPREHEN METABOLIC PANEL: CPT

## 2019-01-02 PROCEDURE — 76770 US EXAM ABDO BACK WALL COMP: CPT

## 2019-01-02 PROCEDURE — 83690 ASSAY OF LIPASE: CPT

## 2019-01-02 PROCEDURE — 87086 URINE CULTURE/COLONY COUNT: CPT

## 2019-01-02 PROCEDURE — 94760 N-INVAS EAR/PLS OXIMETRY 1: CPT

## 2019-01-02 PROCEDURE — 96375 TX/PRO/DX INJ NEW DRUG ADDON: CPT

## 2019-01-02 PROCEDURE — 97163 PT EVAL HIGH COMPLEX 45 MIN: CPT

## 2019-01-02 PROCEDURE — 82140 ASSAY OF AMMONIA: CPT

## 2019-01-02 PROCEDURE — 83970 ASSAY OF PARATHORMONE: CPT

## 2019-01-02 RX ADMIN — Medication SCH ML: at 21:20

## 2019-01-02 RX ADMIN — APIXABAN SCH MG: 5 TABLET, FILM COATED ORAL at 21:19

## 2019-01-02 RX ADMIN — ATORVASTATIN CALCIUM SCH MG: 40 TABLET, FILM COATED ORAL at 21:19

## 2019-01-02 RX ADMIN — ALPRAZOLAM SCH MG: 0.5 TABLET ORAL at 21:33

## 2019-01-02 RX ADMIN — GABAPENTIN SCH MG: 100 CAPSULE ORAL at 14:50

## 2019-01-02 RX ADMIN — GABAPENTIN SCH MG: 100 CAPSULE ORAL at 21:19

## 2019-01-02 RX ADMIN — ACETAMINOPHEN PRN MG: 500 TABLET, FILM COATED ORAL at 16:00

## 2019-01-02 RX ADMIN — ACETAMINOPHEN PRN MG: 500 TABLET, FILM COATED ORAL at 21:32

## 2019-01-02 RX ADMIN — CARVEDILOL SCH MG: 3.12 TABLET, FILM COATED ORAL at 21:19

## 2019-01-02 NOTE — EKG
Test Date:    2019-01-02               Test Time:    06:07:33

Technician:   IRAM                                     

                                                     

MEASUREMENT RESULTS:                                       

Intervals:                                           

Rate:         70                                     

NC:                                                  

QRSD:         198                                    

QT:           482                                    

QTc:          520                                    

Axis:                                                

P:                                                   

NC:                                                  

QRS:          -68                                    

T:            114                                    

                                                     

INTERPRETIVE STATEMENTS:                                       

                                                     

Ventricular-paced rhythm

Abnormal ECG

Compared to ECG 10/01/2018 12:37:24

No significant changes



Electronically Signed On 01-02-19 07:55:26 CST by Nimesh Norris

## 2019-01-02 NOTE — XMS REPORT
Clinical Summary

 Created on:2019



Patient:Lianne Milian

Sex:Female

:1935

External Reference #:[id number]





Demographics







 Address  [address] [place]



   [place] [address]

 

 Home Phone  [phone]

 

 Home Phone  [phone]

 

 Email Address  [email or web address]

 

 Preferred Language  English

 

 Marital Status  

 

 Cheondoism Affiliation  Unknown

 

 Race  White

 

 Ethnic Group  Not  or 









Author







 Organization  [facility]

 

 Address  [address] [place] [address]









Support







 Name  Relationship  Address  Phone

 

 Nayely Milian  Child  [address]  [phone]



     [place] [address]  









Care Team Providers







 Name  Role  Phone

 

 Terry Gay MD  Primary Care Provider  [phone]









Allergies







 Active Allergy  Reactions  Severity  Noted Date  Comments

 

 Propafenone      2016  







Medications







 Medication  Sig  Dispensed  Refills  Start Date  End Date  Status

 

 metFORMIN (GLUCOPHAGE)  Take 500 mg by    0      Active



 500 MG tablet  mouth 2 (two)          



   times a day with          



   meals.          

 

 lisinopril  Take 10 mg by    0      Active



 (PRINIVIL,ZESTRIL) 10 MG  mouth daily.          



 tablet            

 

 apixaban (ELIQUIS) 5 mg  Take 2.5 mg by    0      Active



 tablet  mouth 2 (two)          



   times a day.          

 

 sertraline (ZOLOFT) 50  Take 50 mg by    0      Active



 MG tablet  mouth daily.          

 

 atorvastatin (LIPITOR)  Take 40 mg by    0      Active



 40 MG tablet  mouth daily with          



   dinner.          

 

 lansoprazole (PREVACID)  Take 30 mg by    0      Active



 30 MG capsule  mouth daily          



   before          



   breakfast.          

 

 diphenhydrAMINE  Take 25 mg by    0      Active



 (BENADRYL) 25 mg tablet  mouth nightly as          



   needed for          



   sleep.          

 

 cyanocobalamin 1000 MCG  Take 1,000 mcg    0      Active



 tablet  by mouth daily.          







Active Problems







 Problem  Noted Date

 

 Intertrochanteric fracture of left femur  2017

 

 Systolic heart failure, chronic  2017

 

 Acute kidney injury superimposed on CKD  2017

 

 Type 2 diabetes mellitus with hyperglycemia  2017

 

 Hip fracture requiring operative repair  2017







Social History







 Tobacco Use  Types  Packs/Day  Years Used  Date

 

 Never Smoker        









 Alcohol Use  Drinks/Week  oz/Week  Comments

 

 No      









 Sex Assigned at Birth  Date Recorded

 

 Not on file  









 Job Start Date  Occupation  Industry

 

 Not on file  Not on file  Not on file









 Travel History  Travel Start  Travel End









 No recent travel history available.







Last Filed Vital Signs

Not on file



Plan of Treatment







 Health Maintenance  Due Date  Last Done  Comments

 

 DIABETIC RETINAL EYE EXAM  1935    

 

 DIABETIC FOOT EXAM  1945    

 

 SHINGLES VACCINES (1 of 2)  1985    

 

 PNEUMOCOCCAL POLYSACCHARIDE VACCINE AGE 65 AND OVER  2000    

 

 PNEUMOCOCCAL-13  2000    

 

 INFLUENZA VACCINE  2018    







Implants







 Implanted  Type  Area    Device  Shelf  Model /



         Identifier  Expiration  Serial /



           Date  Lot

 

 T2 F/T Locking Screw 5mmx32.5mm - [id number]  IPM IMPLANT  Left:  SIRENA      
1896 [id number] /



 Implanted: 2017 (Quantity not on file)  DEVICES  Femur  ORTHOPEDICS     
  /

 

 Nail Im Trchntrc W/ Set Scr Ti 130deg [id number] Strl - [id number]  
Orthopedic  Left:  SIRENA    2022  3130 [id number] /



 Implanted: 2017 (Quantity not on file)  Trauma  Femur  ORTHOPEDICS       
/



   Implants          [id number]

 

 Shaft Trnkl Modlr 448mm Strl - [id number]  Orthopedic  N/A: N/A  SIRENA      0227 3000S /



 Implanted: Qty: 1 on 2017 by Woo Bee MD  Trauma    ORTHOPEDICS   
    /



   Implants          [id number]

 

 Screw Bone Lag Ti [id number] - [id number]  Orthopedic  Left:  SIRENA      3060 
0100S /



 Implanted: 2017 (Quantity not on file)  Trauma  Femur  ORTHOPEDICS       
/



   Implants          







Results

Not on fileafter 2018



Insurance







 Payer  Benefit Plan / Group  Subscriber ID  Type  Phone  Address

 

 MEDICARE  MEDICARE PART A AND B  xxxxxxxxxx  Medicare [place], [place]

 

 AETNA  AETNA HMO,POS,EPO, MC/EC  xxxxxx  HMO    









 Guarantor Name  Account Type  Relation to  Date of Birth  Phone  Billing



     Patient      Address

 

 Lianne Milian  Personal/Family  Self  1935  [phone]  [address]







         (Home)  [place] [address]







Advance Directives

Patient has advance care planning documents on file. For more information, 
please contact:[place] [address]

## 2019-01-02 NOTE — ER
Nurse's Notes                                                                                     

 [facility]                                                                

Name: Lianne Milian                                                                               

Age: 83 yrs                                                                                       

Sex: Female                                                                                       

: 1935                                                                                   

MRN: [id number]                                                                                   

Arrival Date: 2019                                                                          

Time: 06:06                                                                                       

Account#: [id number]                                                                            

Bed 6                                                                                             

Private MD:                                                                                       

Diagnosis: Fluid overload;Hyperkalemia;Heart failure, unspecified;Renal insufficiency             

                                                                                                  

Presentation:                                                                                     

                                                                                             

06:07 Presenting complaint: Patient states: gen abd pain, bloating, and burning with          aa1 

      urination for past several months. Reports aching pain 8/10. Denies constipation.           

      Transition of care: patient was not received from another setting of care. Onset of         

      symptoms was 2018. Risk Assessment: Do you want to hurt yourself or someone        

      else? Patient reports no desire to harm self or others. Initial Sepsis Screen: Does the     

      patient meet any 2 criteria? No. Patient's initial sepsis screen is negative. Does the      

      patient have a suspected source of infection? Yes: Acute abdominal pain. Care prior to      

      arrival: IV initiated. 22 GA, in the right hand, Glucose check: 239 Oxygen                  

      administered. via nasal cannula.                                                            

06:07 Method Of Arrival: EMS: [facility]                                                aa1 

06:07 Acuity: LASHAUN 3                                                                           aa1 

                                                                                                  

Historical:                                                                                       

- Allergies:                                                                                      

06:16 Amitriptyline;                                                                          aa1 

06:16 Januvia;                                                                                aa1 

06:16 propafenone HCl;                                                                        aa1 

06:16 rosiglitazone maleate;                                                                  aa1 

06:16 Rythmol;                                                                                aa1 

06:16 sitagliptin phosphate;                                                                  aa1 

06:16 Tape;                                                                                   aa1 

- Home Meds:                                                                                      

06:16 alprazolam 0.5 mg Oral tab as needed [Active]; Lasix 40 mg Oral tab 1 tab once daily    aa1 

      [Active]; gabapentin 100 mg Oral cap [Active]; Eliquis oral oral [Active]; sertraline       

      50 mg Oral tab 1 tab once daily [Active]; lansoprazole 30 mg Oral cpDR 1 cap once daily     

      [Active]; carvedilol 6.25 mg Oral tab 1 tab 2 times per day [Active]; atorvastatin 40       

      mg Oral tab 1 tab once daily [Active];                                                      

- PMHx:                                                                                           

06:16 Anemia; Anxiety; Cancer, Lung; Diabetes - IDDM; GERD; hital hernia; Hypertension; CHF;  aa1 

      Depression;                                                                                 

- PSHx:                                                                                           

06:16 pacemaker;                                                                              aa1 

                                                                                                  

- Immunization history:: Flu vaccine is up to date.                                               

- Social history:: Smoking status: Patient/guardian denies using tobacco.                         

- Ebola Screening: : Patient denies exposure to infectious person Patient denies travel           

  to an Ebola-affected area in the 21 days before illness onset.                                  

                                                                                                  

                                                                                                  

Screenin:18 Abuse screen: Denies threats or abuse. Denies injuries from another. Nutritional        rr5 

      screening: No deficits noted. Tuberculosis screening: No symptoms or risk factors           

      identified. Fall Risk IV access (20 points). Total Kaufman Fall Scale indicates No Risk       

      (0-24 pts).                                                                                 

                                                                                                  

Assessment:                                                                                       

06:07 General: Appears in no apparent distress. uncomfortable, Behavior is calm, cooperative, rr5 

      appropriate for age. Pain: Complains of pain in abdomen Pain does not radiate. Pain         

      currently is 8 out of 10 on a pain scale. Quality of pain is described as aching, Pain      

      began gradually, Is intermittent. Neuro: Level of Consciousness is awake, alert, obeys      

      commands, Oriented to person, place, time, situation, Appropriate for age.                  

      Cardiovascular: Capillary refill < 3 seconds Patient's skin is warm and dry.                

      Respiratory: Airway is patent Respiratory effort is even, unlabored, Respiratory            

      pattern is regular, symmetrical. GI: Abdomen is obese, Bowel sounds present X 4 quads.      

      bloated Reports lower abdominal pain, upper abdominal pain, bloating. : Reports           

      burning with urination. EENT: No signs and/or symptoms were reported regarding the EENT     

      system. Derm: Skin is fragile, is thin, has skin tears on lower extremitites.               

      Musculoskeletal: Capillary refill < 3 seconds, Range of motion: intact in all               

      extremities.                                                                                

07:00 Reassessment: Patient appears in no apparent distress at this time. Patient and/or      rr5 

      family updated on plan of care and expected duration. Pain level reassessed. awaiting       

      for laboratory reports no complaints made, chatting with her .                     

08:12 Reassessment: Patient appears in no apparent distress at this time. Patient and/or      ph  

      family updated on plan of care and expected duration. Pain level reassessed. Patient is     

      alert, oriented x 3, equal unlabored respirations, skin warm/dry/pink. Pt resting           

      quietly, awaiting CT results, family at bedside.                                            

09:30 Reassessment: Patient appears in no apparent distress at this time. Patient and/or      ph  

      family updated on plan of care and expected duration. Pain level reassessed. Patient is     

      alert, oriented x 3, equal unlabored respirations, skin warm/dry/pink.                      

10:30 Reassessment: Patient appears in no apparent distress at this time. Patient and/or      ph  

      family updated on plan of care and expected duration. Pain level reassessed. Pt resting     

      quietly w/ eyes closed, respirations even and unlabored.                                    

11:33 Reassessment: Patient appears in no apparent distress at this time. Patient and/or      ph  

      family updated on plan of care and expected duration. Pain level reassessed. Patient is     

      alert, oriented x 3, equal unlabored respirations, skin warm/dry/pink. Report given to      

      Cami ADLER, pt taken to 2nd floor via stretcher.                                               

                                                                                                  

Vital Signs:                                                                                      

06:10  / 70; Pulse 70; Resp 20; Temp 98.0; Pulse Ox 94% on R/A; Weight 96.16 kg; Height aa1 

      4 ft. 11 in. (149.86 cm); Pain 8/10;                                                        

06:56  / 57 Standing; Pulse 70; Resp 20; Pulse Ox 100% on 3 lpm NC;                     rr5 

08:11  / 53; Pulse 70; Resp 16; Pulse Ox 100% on 3 lpm NC;                              ph  

09:39  / 66; Pulse 65; Resp 18; Pulse Ox 100% on 3 lpm NC;                              ph  

11:00  / 58; Pulse 70; Resp 18; Temp 97.5; Pulse Ox 100% on 3 lpm NC;                   ph  

11:35  / 56; Pulse 70; Resp 18; Pulse Ox 99% on 3 lpm NC;                               ph  

06:10 Body Mass Index 42.82 (96.16 kg, 149.86 cm)                                             aa1 

                                                                                                  

ED Course:                                                                                        

06:06 Patient arrived in ED.                                                                  aa1 

06:07 EKG done, by ED staff, reviewed by Dean Nicholas MD.                                          at1 

06:10 Triage completed.                                                                       aa1 

06:10 Patient has correct armband on for positive identification. Bed in low position. Call   rr5 

      light in reach. Side rails up X2. Cardiac monitor on. Pulse ox on. NIBP on.                 

06:10 Noise minimized. Warm blanket given. Head of bed elevated.                              rr5 

06:10 Arm band placed on.                                                                     rr5 

06:11 Aspen German FNP-C is PHCP.                                                        snw 

06:11 Dean Nicholas MD is Attending Physician.                                                    snw 

06:15 Oxygen administration via nasal cannula \T\ 2L/min.                                       aa1 

06:15 Maintain EMS IV. Dressing intact. Good blood return noted. Site clean \T\ dry. Gauge \T\    rr
5

      site: G22 right hand. IV is intact, Flushed right hand with 5 ml normal saline.             

06:30 Inserted saline lock: 20 gauge in left antecubital area, using aseptic technique. Blood rr5 

      collected. inserted by ED tech fransisco.                                            

06:53 Chris Gary, RN is Primary Nurse.                                                    rr5 

06:55 Cabrera cath inserted, using sterile technique, 16 Fr., by me, balloon inflated, to       rr5 

      gravity drainage, urine specimen collected. returned clear yellow urine. Patient            

      tolerated well.                                                                             

07:20 Notified ED physician of a critical lab result(s). K+ 6.5.                              ss  

07:35 CT completed. Patient tolerated procedure well. Patient moved to CT via stretcher.      sj  

      Patient moved back from CT.                                                                 

07:37 CT Stone Protocol In Process Unspecified.                                               EDMS

08:46 Boby Carlos MD is Hospitalizing Provider.                                            snw 

11:02 Hospitalizing Provider role handed off by Boby Carlos MD                             snw 

11:02 Kristy Carpenter MD is Hospitalizing Provider.                                           snw 

11:04 No provider procedures requiring assistance completed. Patient admitted, IV remains in  ph  

      place.                                                                                      

                                                                                                  

Administered Medications:                                                                         

06:22 Drug: NS 0.9% 1000 ml Route: IV; Rate: 75 ml/hr; Site: right hand;                      ca1 

11:31 Follow up: IV Status: Infusion continued upon admission                                 ph  

07:28 CANCELLED (other intervention used): D50W 50 ml IVP once; (1 amp)                       snw 

07:45 Drug: Albuterol 2.5 mg Route: Inhalation;                                               ph  

07:55 Drug: Calcium Chloride 10% 10 ml Route: IVP; Site: right hand;                          ph  

11:31 Follow up: Response: No adverse reaction                                                ph  

07:58 Drug: D50W 25 ml Route: IVP; Site: right hand;                                          ph  

11:32 Follow up: Response: No adverse reaction                                                ph  

08:00 Drug: Insulin Regular Human 10 units {Co-Signature: la1 (Manohar Giles RN).} Route: IVP;   ph  

      Site: right hand;                                                                           

11:31 Follow up: Response: No adverse reaction                                                ph  

08:03 Drug: Sodium Bicarbonate 1 amp Route: IVP; Site: right hand;                            ph  

11:32 Follow up: Response: No adverse reaction                                                ph  

08:05 Drug: Albuterol 2.5 mg Route: Inhalation;                                               ph  

08:25 Drug: Albuterol 2.5 mg Route: Inhalation;                                               ph  

09:20 Drug: Lasix 20 mg Route: IVP; Site: right hand;                                         ph  

11:32 Follow up: Urine output 275 ml; Response: No adverse reaction                           ph  

                                                                                                  

                                                                                                  

Output:                                                                                           

09:05 Urine: 400ml (Cabrera); Total: 400ml.                                                     ph  

11:32 Urine: 275ml; Total: 675ml.                                                             ph  

                                                                                                  

Outcome:                                                                                          

08:48 Decision to Hospitalize by Provider.                                                    snw 

11:34 Admitted to Tele accompanied by tech, via stretcher, room 219, with oxygen, with chart, ph  

      Report called to  Cami ADLER                                                                   

11:34 Condition: stable                                                                           

11:34 Instructed on the need for admit.                                                           

11:53 Patient left the ED.                                                                    ph  

                                                                                                  

Signatures:                                                                                       

Dispatcher [employer]                           EDMS                                                 

Denise Lee RN                        RN   aa1                                                  

Aspen German, FNP-C                 FNP-Csnw                                                  

Cat Brady Shelby, RN RN   ss                                                   

Nimisha Kiran, EKG Tech              EKG Tat1                                                  

Nayely Knowles RN RN ph Roque, Raymond RN                      RN   rr5                                                  

Lakeshia Orellana RN                        RN   ca1                                                  

Manohar Giles RN                                la1                                                  

                                                                                                  

Corrections: (The following items were deleted from the chart)                                    

07:07 06:30 Inserted saline lock: 20 gauge in left antecubital area, using aseptic technique. rr5 

      Blood collected. rr5                                                                        

                                                                                                  

**************************************************************************************************

## 2019-01-02 NOTE — EDPHYS
Physician Documentation                                                                           

 [facility]                                                                

Name: Lianne Milian                                                                               

Age: 83 yrs                                                                                       

Sex: Female                                                                                       

: 1935                                                                                   

MRN: [id number]                                                                                   

Arrival Date: 2019                                                                          

Time: 06:06                                                                                       

Account#: [id number]                                                                            

Bed 6                                                                                             

Private MD:                                                                                       

ED Physician Dean Nicholas                                                                             

HPI:                                                                                              

                                                                                             

07:27 This 83 yrs old  Female presents to ER via EMS with complaints of Abdominal    snw 

      Pain.                                                                                       

07:27 The patient presents with abdominal pain abdominal distention. Onset: The               snw 

      symptoms/episode began/occurred 1 month(s) ago, and became persistent. The symptoms do      

      not radiate. Associated signs and symptoms: Pertinent positives: dysuria, early satiety     

      . The symptoms are described as vague. Severity of pain: At its worst the pain was          

      moderate. The patient has experienced similar episodes in the past. hospitalized            

      recently.                                                                                   

                                                                                                  

Historical:                                                                                       

- Allergies:                                                                                      

06:16 Amitriptyline;                                                                          aa1 

06:16 Januvia;                                                                                aa1 

06:16 propafenone HCl;                                                                        aa1 

06:16 rosiglitazone maleate;                                                                  aa1 

06:16 Rythmol;                                                                                aa1 

06:16 sitagliptin phosphate;                                                                  aa1 

06:16 Tape;                                                                                   aa1 

- Home Meds:                                                                                      

06:16 alprazolam 0.5 mg Oral tab as needed [Active]; Lasix 40 mg Oral tab 1 tab once daily    aa1 

      [Active]; gabapentin 100 mg Oral cap [Active]; Eliquis oral oral [Active]; sertraline       

      50 mg Oral tab 1 tab once daily [Active]; lansoprazole 30 mg Oral cpDR 1 cap once daily     

      [Active]; carvedilol 6.25 mg Oral tab 1 tab 2 times per day [Active]; atorvastatin 40       

      mg Oral tab 1 tab once daily [Active];                                                      

- PMHx:                                                                                           

06:16 Anemia; Anxiety; Cancer, Lung; Diabetes - IDDM; GERD; hital hernia; Hypertension; CHF;  aa1 

      Depression;                                                                                 

- PSHx:                                                                                           

06:16 pacemaker;                                                                              aa1 

                                                                                                  

- Immunization history:: Flu vaccine is up to date.                                               

- Social history:: Smoking status: Patient/guardian denies using tobacco.                         

- Ebola Screening: : Patient denies exposure to infectious person Patient denies travel           

  to an Ebola-affected area in the 21 days before illness onset.                                  

                                                                                                  

                                                                                                  

ROS:                                                                                              

07:12 Eyes: Negative for injury, pain, redness, and discharge, ENT: Negative for injury,      snw 

      pain, and discharge, Neck: Negative for injury, pain, and swelling, Cardiovascular:         

      Negative for chest pain, palpitations, and edema, Respiratory: Negative for shortness       

      of breath, cough, wheezing, and pleuritic chest pain.                                       

07:12 Back: Negative for injury and pain.                                                         

07:12 MS/Extremity: Negative for injury and deformity, Skin: Negative for injury, rash, and       

      discoloration, Neuro: Negative for headache, weakness, numbness, tingling, and seizure.     

07:12 Constitutional: Positive for malaise, poor PO intake.                                       

07:12 Abdomen/GI: Positive for abdominal pain, nausea, abdominal distension.                      

07:12 : Positive for urinary symptoms, small amounts.                                           

                                                                                                  

Exam:                                                                                             

07:11 Constitutional:  This is a well developed, well nourished patient who is awake, alert,  snw 

      and in no acute distress. Head/Face:  Normocephalic, atraumatic. Eyes:  Pupils equal        

      round and reactive to light, extra-ocular motions intact.  Lids and lashes normal.          

      Conjunctiva and sclera are non-icteric and not injected.  Cornea within normal limits.      

      Periorbital areas with no swelling, redness, or edema. ENT:  Nares patent. No nasal         

      discharge, no septal abnormalities noted.  Tympanic membranes are normal and external       

      auditory canals are clear.  Oropharynx with no redness, swelling, or masses, exudates,      

      or evidence of obstruction, uvula midline.  Mucous membranes moist. Neck:  Trachea          

      midline, no thyromegaly or masses palpated, and no cervical lymphadenopathy.  Supple,       

      full range of motion without nuchal rigidity, or vertebral point tenderness.  No            

      Meningismus. Chest/axilla:  Normal chest wall appearance and motion.  Nontender with no     

      deformity.  No lesions are appreciated. Cardiovascular:  Regular rate and rhythm with a     

      normal S1 and S2.  No gallops, murmurs, or rubs.  Normal PMI, no JVD.  No pulse             

      deficits.                                                                                   

07:11 Back:  No spinal tenderness.  No costovertebral tenderness.  Full range of motion.          

      Skin:  Warm, dry with normal turgor.  Normal color with no rashes, no lesions, and no       

      evidence of cellulitis. MS/ Extremity:  Pulses equal, no cyanosis.  Neurovascular           

      intact.  Full, normal range of motion. Neuro:  Awake and alert, GCS 15, oriented to         

      person, place, time, and situation.  Cranial nerves II-XII grossly intact.  Motor           

      strength 5/5 in all extremities.  Sensory grossly intact.  Cerebellar exam normal.          

      Normal gait. Psych:  Awake, alert, with orientation to person, place and time.              

      Behavior, mood, and affect are within normal limits.                                        

07:11 Respiratory: the patient does not display signs of respiratory distress,  Respirations:     

      normal, Breath sounds: + upper airway congestion.                                           

07:11 Abdomen/GI: Inspection: distension, Bowel sounds: active, Palpation: soft, mild             

      abdominal tenderness, in the abdomen.                                                       

                                                                                                  

Vital Signs:                                                                                      

06:10  / 70; Pulse 70; Resp 20; Temp 98.0; Pulse Ox 94% on R/A; Weight 96.16 kg; Height aa1 

      4 ft. 11 in. (149.86 cm); Pain 8/10;                                                        

06:56  / 57 Standing; Pulse 70; Resp 20; Pulse Ox 100% on 3 lpm NC;                     rr5 

08:11  / 53; Pulse 70; Resp 16; Pulse Ox 100% on 3 lpm NC;                              ph  

09:39  / 66; Pulse 65; Resp 18; Pulse Ox 100% on 3 lpm NC;                              ph  

11:00  / 58; Pulse 70; Resp 18; Temp 97.5; Pulse Ox 100% on 3 lpm NC;                   ph  

11:35  / 56; Pulse 70; Resp 18; Pulse Ox 99% on 3 lpm NC;                               ph  

06:10 Body Mass Index 42.82 (96.16 kg, 149.86 cm)                                             aa1 

                                                                                                  

MDM:                                                                                              

06:14 Patient medically screened.                                                             snw 

08:48 Data reviewed: vital signs, nurses notes. Data interpreted: Pulse oximetry: on room air snw 

      is 100 %. Interpretation: normal. Counseling: I had a detailed discussion with the          

      patient and/or guardian regarding: the historical points, exam findings, and any            

      diagnostic results supporting the discharge/admit diagnosis, lab results, radiology         

      results, the need for further work-up and treatment in the hospital. Physician              

      consultation: Boby Carlos MD was called at 08:30, regarding admission.                     

                                                                                                  

                                                                                             

06:06 Order name: Basic Metabolic Panel; Complete Time: 07:25                                 aa1 

                                                                                             

06:06 Order name: CBC with Diff; Complete Time: 07:35                                         aa1 

                                                                                             

06:06 Order name: Creatinine for Radiology; Complete Time: 07:07                              aa                                                                                             

06:06 Order name: Hepatic Function; Complete Time: 07:25                                      aa                                                                                             

06:06 Order name: Lipase; Complete Time: 07:25                                                aa                                                                                             

06:13 Order name: Urine Microscopic Only; Complete Time: 07:25                                snw 

                                                                                             

06:13 Order name: Urine Culture                                                               w 

                                                                                             

06:37 Order name: Blood Culture Adult (2)                                                     w 

                                                                                             

06:56 Order name: Urine Dipstick--Ancillary (enter results)                                   mw2 

                                                                                             

06:57 Order name: Urine Dipstick-Ancillary                                                    EDMS

                                                                                             

07:08 Order name: CT Stone Protocol; Complete Time: 07:54                                     snw 

                                                                                             

08:40 Order name: Chem 7; Complete Time: 10:08                                                snw 

                                                                                             

06:06 Order name: IV Saline Lock; Complete Time: 06:21                                        aa                                                                                             

06:06 Order name: Labs collected and sent; Complete Time: 06:54                               aa                                                                                             

06:13 Order name: Cath; Complete Time: 06:54                                                  snw 

                                                                                             

07:35 Order name: EKG; Complete Time: 07:35                                                   snw 

                                                                                             

07:35 Order name: EKG - Nurse/Tech; Complete Time: 08:05                                      snw 

                                                                                                  

Administered Medications:                                                                         

06:22 Drug: NS 0.9% 1000 ml Route: IV; Rate: 75 ml/hr; Site: right hand;                      ca1 

11:31 Follow up: IV Status: Infusion continued upon admission                                 ph  

07:28 CANCELLED (other intervention used): D50W 50 ml IVP once; (1 amp)                       snw 

07:45 Drug: Albuterol 2.5 mg Route: Inhalation;                                               ph  

07:55 Drug: Calcium Chloride 10% 10 ml Route: IVP; Site: right hand;                          ph  

11:31 Follow up: Response: No adverse reaction                                                ph  

07:58 Drug: D50W 25 ml Route: IVP; Site: right hand;                                          ph  

11:32 Follow up: Response: No adverse reaction                                                ph  

08:00 Drug: Insulin Regular Human 10 units {Co-Signature: la1 (Manohar Giles RN).} Route: IVP;   ph  

      Site: right hand;                                                                           

11:31 Follow up: Response: No adverse reaction                                                ph  

08:03 Drug: Sodium Bicarbonate 1 amp Route: IVP; Site: right hand;                            ph  

11:32 Follow up: Response: No adverse reaction                                                ph  

08:05 Drug: Albuterol 2.5 mg Route: Inhalation;                                               ph  

08:25 Drug: Albuterol 2.5 mg Route: Inhalation;                                               ph  

09:20 Drug: Lasix 20 mg Route: IVP; Site: right hand;                                         ph  

11:32 Follow up: Urine output 275 ml; Response: No adverse reaction                           ph  

                                                                                                  

                                                                                                  

Disposition:                                                                                      

19 08:48 Hospitalization ordered by Kristy Carpenter for Inpatient Admission. Preliminary     

  diagnosis are Fluid overload, Hyperkalemia, Heart failure, unspecified, Renal                   

  insufficiency.                                                                                  

- Bed requested for Telemetry/MedSurg (Inpatient).                                                

- Status is Inpatient Admission.                                                              ph  

- Condition is Fair.                                                                              

- Problem is an acute exacerbation.                                                               

- Symptoms have worsened.                                                                         

UTI on Admission? No                                                                              

                                                                                                  

                                                                                                  

                                                                                                  

Addendum:                                                                                         

2019                                                                                        

     08:07 Co-signature as Attending Physician, Gideon Pvaon MD I agree with the assessment and  c
ha

           plan of care.                                                                          

                                                                                                  

Signatures:                                                                                       

Dispatcher [employer]                           EDMS                                                 

Denise Lee RN                        RN   aa1                                                  

Gideon Pavon MD MD cha Therrien, Shelly, FNP-C                 FNP-Csnw                                                  

Puala Lucero Patricia, RN RN                                                      

Dina Orlando RN RN                                                      

Lakeshia Orellana RN RN   [facility]                                                  

Manohar Giles RN                                la1                                                  

                                                                                                  

Corrections: (The following items were deleted from the chart)                                    

                                                                                             

07:28 07:27 D50W 50 ml IVP once; (1 amp) ordered. snw                                         snw 

10:45 08:48 Hospitalization Ordered by Boby Carlos MD for Inpatient Admission. Preliminary   ag  

      diagnosis is Fluid overload; Hyperkalemia; Heart failure, unspecified; Renal                

      insufficiency. Bed requested for Telemetry/MedSurg (Inpatient). Status is Inpatient         

      Admission. Condition is Fair. Problem is an acute exacerbation. Symptoms have worsened.     

      UTI on Admission? No. snw                                                                   

11:02 10:45 2019 08:48 Hospitalization Ordered by Boby Carlos MD for Inpatient         snw 

      Admission. Preliminary diagnosis is Fluid overload; Hyperkalemia; Heart failure,            

      unspecified; Renal insufficiency. Bed requested for Telemetry/MedSurg (Inpatient).          

      Status is Inpatient Admission. Condition is Fair. Problem is an acute exacerbation.         

      Symptoms have worsened. UTI on Admission? No. ag                                            

11:37 11:02 2019 08:48 Hospitalization Ordered by Kristy Carpenter MD for Inpatient        df  

      Admission. Preliminary diagnosis is Fluid overload; Hyperkalemia; Heart failure,            

      unspecified; Renal insufficiency. Bed requested for Telemetry/MedSurg (Inpatient).          

      Status is Inpatient Admission. Condition is Fair. Problem is an acute exacerbation.         

      Symptoms have worsened. UTI on Admission? No. snw                                           

11:53 11:37 2019 08:48 Hospitalization Ordered by Kristy Carpenter MD for Inpatient        ph  

      Admission. Preliminary diagnosis is Fluid overload; Hyperkalemia; Heart failure,            

      unspecified; Renal insufficiency. Bed requested for Telemetry/MedSurg (Inpatient).          

      Status is Inpatient Admission. Condition is Fair. Problem is an acute exacerbation.         

      Symptoms have worsened. UTI on Admission? No. df                                            

                                                                                                  

**************************************************************************************************

## 2019-01-02 NOTE — RAD REPORT
EXAM DESCRIPTION:  CT - [facility] Protocol - 1/2/2019 7:37 am

 

CLINICAL HISTORY:  Abdominal pain, dysuria

 

COMPARISON:  September 2018

 

TECHNIQUE:  Axial 5 mm thick CT imaging of the abdomen and pelvis was performed without IV contrast. 
No IV contrast was given because of allergy, abnormal renal function, patient refusal or physician re
quest. Oral contrast was given.

 

All CT scans are performed using dose optimization technique as appropriate and may include automated
 exposure control or mA/KV adjustment according to patient size.

 

FINDINGS:  Cardiomegaly is present with a small pericardial effusion. Small bilateral pleural effusio
ns are present. Dense calcification of the bronchial tree noted. Minimal left lung base atelectasis c
hanges are present. There is a masslike consolidation in the right infrahilar region only partially v
isualized on this study. Air bronchograms are present.

 

Liver and spleen show no focal finding on noncontrast imaging. No clear change from prior examination
. Pancreatic atrophy seen with no acute pancreatic process confirmed. Gallbladder assessment is limit
ed due to motion. No gross abnormality. Gallstones are possibly present. There is dense arterial tree
 calcification adjacent to the gallbladder that limits assessment of gallstones. No biliary tree dila
tation.

 

No hydronephrosis of either kidney. Dense arterial tree calcifications are present. A 3.3 centimeter 
exophytic isodense mass projects from the lower pole left kidney stable from September 2018.  Right a
drenal gland fullness has not changed. A 3.5 centimeter mostly fat attenuation left adrenal mass has 
not changed. Isodense renal masses and pyelonephritis cannot be excluded in the absence of IV contras
t. Urinary bladder is fully contracted around a Cabrera catheter. Uterus has not changed. Prominent jaylon
cifications are noted. Ovaries are obscured.

 

Stomach is decompressed. No dilated large or small bowel. Diverticulosis is present. Active GI proces
s is not suspected. No free air or pneumatosis. Mild to moderate ascites present increased somewhat f
rom September. There is fluid retention in the subcutaneous fatty tissues as well. No mass or bulky l
ymphadenopathy.

 

Disc and bony degenerative changes are present. Left femur postsurgical changes are noted.

 

 

IMPRESSION:  No bowel obstruction, free air or surgically emergent finding.

 

Failure/ volume overload pattern is seen with cardiomegaly, pericardial effusion, pleural effusions, 
increasing ascites and subcutaneous fat fluid retention.

 

Masslike consolidation in the right infrahilar region only partially imaged. In the acute clinical se
tting this is most likely pneumonia. Malignant etiology is not excluded.

 

Approximately 3.3 centimeter exophytic isodense mass lower pole left kidney similar to September 2018
. This could be a complex cyst or solid mass.

 

Full assessment is limited is the absence of IV contrast.

## 2019-01-02 NOTE — XMS REPORT
Continuity of Care Document

 Created on:2017



Patient:VALARIE VALENCIA

Sex:Female

:1935

External Reference #:[id number]





Demographics







 Address  [address] [place] [address]

 

 Phone  [phone]

 

 Preferred Language  Unknown

 

 Marital Status  Unknown

 

 Synagogue Affiliation  Unknown

 

 Race  Unknown

 

 Ethnic Group  Unknown









Author







 Organization  [facility]









Problems







 Problem  Status  Onset  Classification  Date  Comments  Source



     Date    Reported    



             



             

 

 Discharge    2017    [facility]



 Diagnosis:    7        



 Complication of            



 Cabrera catheter            



             



             

 

 CATHETER PAIN  Active          [facility]



     7        [place]



             



             







Medications







 Medication  Details  Route  Status  Patient  Ordering  Order  Source



         Instructions  Provider  Date  



               



               



               

 

 Acetaminophen  1 tab,    Inactive        [facility]



 300 MG / Codeine  Route: PO,          017  [place]



 Phosphate 30 MG  Drug Form:            



 Oral Tablet  TAB,            



 [Tylenol with  Dosing            



 Codeine #3]  Weight            



   81.818,            



   kg, ONCE,            



   STAT,            



   Start            



   date:            



   17            



   21:22:00            



   CDT, Stop            



   date:            



   17            



   21:22:00            



   CDTNotes:            



   Do not            



   exceed            



   4gm/day of            



   acetaminop            



   hen.            



   (Same as:            



   Tylenol            



   with            



   Codeine #            



   3)            



               



               







Allergies, Adverse Reactions, Alerts







 Substance  Category  Reaction  Severity  Reaction  Status  Date  Comments  
Source



         type    Reported    



                 



                 



                 

 

 Rythmol  Assertion      Drug  Active      



         allergy        [place]



                 



                 



                 







Immunizations







 Immunization  Date Given  Site  Status  Last Updated  Comments  Source



             



             







Results







 Order  Results  Value  Reference  Date  Interpretation  Comments  Source



 Name      Range        



               



               







Vital Signs







 Vital Sign  Value  Date  Comments  Source



         

 

 Heart Rate  61  2017    [facility]



         



         

 

 Respitory Rate  18  2017    [facility]



         



         

 

 Temperature Oral (F)  98.2 F  2017    [facility]



         



         

 

 Systolic (mm Hg)  132  2017    [facility]



         



         

 

 Diastolic (mm Hg)  76  2017    [facility]



         



         

 

 Systolic (mm Hg)  142  2017    [facility]



         



         

 

 Diastolic (mm Hg)  72  2017    [facility]



         



         

 

 Respitory Rate  18  2017    [facility]



         



         

 

 Heart Rate  64  2017    [facility]



         



         

 

 Temperature Oral (F)  98.2 F  2017    [facility]



         



         

 

 Heart Rate  62  2017    [facility]



         



         

 

 Systolic (mm Hg)  152  2017    [facility]



         



         

 

 Diastolic (mm Hg)  68  2017    [facility]



         



         

 

 Respitory Rate  18  2017    [facility]



         



         

 

 BMI Calculated  29.11  2017    [facility]



         



         

 

 Height  167.64 cm  2017    [facility]



         



         

 

 Weight  81.818  2017    [facility]



         



         

 

 Temperature Oral (F)  98.0 F  2017    [facility]



         



         







Encounters







 Location  Location  Encounter  Encounter  Reason  Attending  ADM  DC  Status  
Source



   Details  Type  Number  For  Provider  Date  Date    



         Visit          



                   



                   



                   

 

 [facility]  [id number]    Ganesh      MARILUZ Braxton  /2017    [facility]                  



                   



                   







Procedures







 Procedure  Code  Date  Perfomer  Comments  Source



           



           

 

 Hip arthroplasty  [id number]        [facility]

## 2019-01-03 LAB
ALBUMIN SERPL BCP-MCNC: 2.5 G/DL (ref 3.4–5)
ALP SERPL-CCNC: 133 U/L (ref 45–117)
ALT SERPL W P-5'-P-CCNC: 10 U/L (ref 12–78)
AST SERPL W P-5'-P-CCNC: 9 U/L (ref 15–37)
BUN BLD-MCNC: 39 MG/DL (ref 7–18)
BUN BLD-MCNC: 40 MG/DL (ref 7–18)
BUN BLD-MCNC: 42 MG/DL (ref 7–18)
GLUCOSE SERPLBLD-MCNC: 169 MG/DL (ref 74–106)
GLUCOSE SERPLBLD-MCNC: 298 MG/DL (ref 74–106)
GLUCOSE SERPLBLD-MCNC: 302 MG/DL (ref 74–106)
HCT VFR BLD CALC: 32.5 % (ref 36–45)
LYMPHOCYTES # SPEC AUTO: 1 K/UL (ref 0.7–4.9)
MAGNESIUM SERPL-MCNC: 2.8 MG/DL (ref 1.8–2.4)
PMV BLD: 8.5 FL (ref 7.6–11.3)
POTASSIUM SERPL-SCNC: 5.2 MMOL/L (ref 3.5–5.1)
POTASSIUM SERPL-SCNC: 5.6 MMOL/L (ref 3.5–5.1)
POTASSIUM SERPL-SCNC: 5.8 MMOL/L (ref 3.5–5.1)
RBC # BLD: 4.03 M/UL (ref 3.86–4.86)

## 2019-01-03 RX ADMIN — HUMAN INSULIN SCH UNIT: 100 INJECTION, SOLUTION SUBCUTANEOUS at 21:42

## 2019-01-03 RX ADMIN — Medication SCH ML: at 09:00

## 2019-01-03 RX ADMIN — APIXABAN SCH MG: 5 TABLET, FILM COATED ORAL at 21:40

## 2019-01-03 RX ADMIN — GABAPENTIN SCH MG: 100 CAPSULE ORAL at 21:41

## 2019-01-03 RX ADMIN — CARVEDILOL SCH MG: 3.12 TABLET, FILM COATED ORAL at 09:21

## 2019-01-03 RX ADMIN — ATORVASTATIN CALCIUM SCH MG: 40 TABLET, FILM COATED ORAL at 21:41

## 2019-01-03 RX ADMIN — PANTOPRAZOLE SODIUM SCH MG: 40 TABLET, DELAYED RELEASE ORAL at 09:21

## 2019-01-03 RX ADMIN — ACETAMINOPHEN PRN MG: 500 TABLET, FILM COATED ORAL at 05:42

## 2019-01-03 RX ADMIN — ALPRAZOLAM SCH MG: 0.5 TABLET ORAL at 21:53

## 2019-01-03 RX ADMIN — CARVEDILOL SCH MG: 3.12 TABLET, FILM COATED ORAL at 21:41

## 2019-01-03 RX ADMIN — SERTRALINE HYDROCHLORIDE SCH MG: 50 TABLET ORAL at 09:21

## 2019-01-03 RX ADMIN — GABAPENTIN SCH MG: 100 CAPSULE ORAL at 09:21

## 2019-01-03 RX ADMIN — ACETAMINOPHEN PRN MG: 500 TABLET, FILM COATED ORAL at 21:40

## 2019-01-03 RX ADMIN — GABAPENTIN SCH MG: 100 CAPSULE ORAL at 13:00

## 2019-01-03 RX ADMIN — APIXABAN SCH MG: 5 TABLET, FILM COATED ORAL at 09:22

## 2019-01-03 RX ADMIN — HUMAN INSULIN SCH UNIT: 100 INJECTION, SOLUTION SUBCUTANEOUS at 17:32

## 2019-01-03 RX ADMIN — ACETAMINOPHEN PRN MG: 500 TABLET, FILM COATED ORAL at 09:21

## 2019-01-03 RX ADMIN — Medication SCH ML: at 21:46

## 2019-01-03 NOTE — P.PN
Subjective


Date of Service: 01/03/19





Patient seen and examined at bedside with RN.  Chart reviewed.  Currently 

complaining of having some leg cramps at this time.  Currently potassium is 

elevated with no EKG changes noted.





Review of Systems


10-point ROS is otherwise unremarkable





Physical Examination





- Vital Signs


Temperature: 97.2 F


Blood Pressure: 126/59


Pulse: 90


Respirations: 18


Pulse Ox (%): 95





- Physical Exam


General: Alert, In no apparent distress, Obese


HEENT: Atraumatic, PERRLA, EOMI


Neck: Supple, JVD not distended


Respiratory: Normal air movement, Crackles/rales


Cardiovascular: Regular rate/rhythm, Normal S1 S2


Gastrointestinal: Normal bowel sounds, No tenderness


Musculoskeletal: No tenderness


Integumentary: No rashes


Neurological: Normal speech, Normal tone, Normal affect


Lymphatics: No axilla or inguinal lymphadenopathy





- Studies


Medications List Reviewed: Yes





Assessment And Plan





- Current Problems (Diagnosis)


(1) Hyperkalemia


Current Visit: Yes   Status: Acute   


Plan: 


Hyperkalemia most likely secondary to volume overload.  No EKG changes noted


-potassium elevated today.  Will give Kayexalate at this time.


-continue with IV Lasix at this time as well.  Patient placed on a moderate 

insulin sliding scale as well.


-nephrology is been consulted.  Awaiting recommendations at this time.








(2) Abdominal pain


Current Visit: Yes   Status: Chronic   


Plan: 


Abdominal pain most likely secondary to anasarca


-IV Lasix at this time


-abdominal CT negative for any acute abnormality


-encourage patient to ambulate at this time


Qualifiers: 


   Abdominal location: generalized   Qualified Code(s): R10.84 - Generalized 

abdominal pain   





(3) Anasarca


Onset Date: 10/01/18   Current Visit: No   Status: Acute   


Plan: 


Anasarca most likely secondary to congestive heart failure versus CKD


-IV Lasix at this time











(4) Hypervolemia


Onset Date: 10/01/18   Current Visit: No   Status: Acute   


Plan: 


Hyperkalemia most likely secondary to CHF versus CKD


-IV Lasix at this time


-no IV fluids at this time


-hypovolemia with pleural effusion along with pulmonary edema as well


-1 L fluid restriction and educated extensively on diet compliance


Qualifiers: 


   Hypervolemia type: other   Qualified Code(s): E87.79 - Other fluid overload 

  





(5) CKD (chronic kidney disease)


Onset Date: 09/08/15   Current Visit: No   Status: Chronic   


Plan: 


Acute on chronic kidney disease most likely secondary to hypervolemia


-IV Lasix at this time


-nephrology has been consulted.  Awaiting recommendations at this time


Qualifiers: 


   Chronic kidney disease stage: stage 3 (moderate)   Qualified Code(s): N18.3 

- Chronic kidney disease, stage 3 (moderate)   





(6) Atrial fibrillation


Onset Date: 10/12/17   Current Visit: No   Status: Chronic   


Qualifiers: 


   Atrial fibrillation type: paroxysmal   Qualified Code(s): I48.0 - Paroxysmal 

atrial fibrillation   





(7) CHF (congestive heart failure)


Onset Date: 05/15/15   Current Visit: No   Status: Chronic   


Qualifiers: 


   Heart failure type: systolic   Heart failure chronicity: acute on chronic   

Qualified Code(s): I50.23 - Acute on chronic systolic (congestive) heart 

failure   





(8) Depression with anxiety


Onset Date: 09/17/18   Current Visit: No   Status: Chronic   





(9) GERD (gastroesophageal reflux disease)


Onset Date: 10/12/17   Current Visit: No   Status: Chronic   


Qualifiers: 


   Esophagitis presence: without esophagitis   Qualified Code(s): K21.9 - Gastro

-esophageal reflux disease without esophagitis   





(10) Hypertension


Onset Date: 10/12/17   Current Visit: No   Status: Chronic   


Qualifiers: 


   Hypertension type: essential hypertension 





(11) Type 2 diabetes mellitus


Onset Date: 10/12/17   Current Visit: No   Status: Chronic   


Qualifiers: 


   Diabetes mellitus long term insulin use: with long term use   Diabetes 

mellitus complication status: without complication   Qualified Code(s): E11.9 - 

Type 2 diabetes mellitus without complications; Z79.4 - Long term (current) use 

of insulin   


Discharge Plan: Home


Plan to discharge in: 48 Hours





- Code Status/Comfort Care


Code Status Assessed: Yes


Critical Care: No

## 2019-01-04 LAB
ALBUMIN SERPL BCP-MCNC: 2.8 G/DL (ref 3.4–5)
ALP SERPL-CCNC: 137 U/L (ref 45–117)
ALT SERPL W P-5'-P-CCNC: 9 U/L (ref 12–78)
AST SERPL W P-5'-P-CCNC: 14 U/L (ref 15–37)
BUN BLD-MCNC: 41 MG/DL (ref 7–18)
GLUCOSE SERPLBLD-MCNC: 115 MG/DL (ref 74–106)
HCT VFR BLD CALC: 34.9 % (ref 36–45)
LYMPHOCYTES # SPEC AUTO: 0.9 K/UL (ref 0.7–4.9)
MAGNESIUM SERPL-MCNC: 2.8 MG/DL (ref 1.8–2.4)
PMV BLD: 8.3 FL (ref 7.6–11.3)
POTASSIUM SERPL-SCNC: 4.8 MMOL/L (ref 3.5–5.1)
RBC # BLD: 4.33 M/UL (ref 3.86–4.86)

## 2019-01-04 RX ADMIN — HUMAN INSULIN SCH UNIT: 100 INJECTION, SOLUTION SUBCUTANEOUS at 21:18

## 2019-01-04 RX ADMIN — ALPRAZOLAM SCH MG: 0.5 TABLET ORAL at 21:17

## 2019-01-04 RX ADMIN — ALBUMIN (HUMAN) SCH MG: 5 SOLUTION INTRAVENOUS at 09:09

## 2019-01-04 RX ADMIN — SERTRALINE HYDROCHLORIDE SCH MG: 50 TABLET ORAL at 09:08

## 2019-01-04 RX ADMIN — HUMAN INSULIN SCH: 100 INJECTION, SOLUTION SUBCUTANEOUS at 16:30

## 2019-01-04 RX ADMIN — HUMAN INSULIN SCH: 100 INJECTION, SOLUTION SUBCUTANEOUS at 07:30

## 2019-01-04 RX ADMIN — GABAPENTIN SCH MG: 100 CAPSULE ORAL at 09:08

## 2019-01-04 RX ADMIN — ATORVASTATIN CALCIUM SCH MG: 40 TABLET, FILM COATED ORAL at 21:17

## 2019-01-04 RX ADMIN — GABAPENTIN SCH MG: 100 CAPSULE ORAL at 14:44

## 2019-01-04 RX ADMIN — Medication SCH ML: at 21:18

## 2019-01-04 RX ADMIN — CARVEDILOL SCH MG: 3.12 TABLET, FILM COATED ORAL at 09:08

## 2019-01-04 RX ADMIN — HUMAN INSULIN SCH: 100 INJECTION, SOLUTION SUBCUTANEOUS at 11:30

## 2019-01-04 RX ADMIN — APIXABAN SCH MG: 5 TABLET, FILM COATED ORAL at 09:08

## 2019-01-04 RX ADMIN — APIXABAN SCH MG: 5 TABLET, FILM COATED ORAL at 21:17

## 2019-01-04 RX ADMIN — GABAPENTIN SCH MG: 100 CAPSULE ORAL at 21:21

## 2019-01-04 RX ADMIN — CARVEDILOL SCH MG: 3.12 TABLET, FILM COATED ORAL at 21:17

## 2019-01-04 RX ADMIN — PANTOPRAZOLE SODIUM SCH MG: 40 TABLET, DELAYED RELEASE ORAL at 09:08

## 2019-01-04 RX ADMIN — ALBUMIN (HUMAN) SCH MG: 5 SOLUTION INTRAVENOUS at 17:27

## 2019-01-04 RX ADMIN — Medication SCH ML: at 09:10

## 2019-01-04 NOTE — P.PN
Subjective


Date of Service: 01/04/19





Patient seen and examined at bedside with RN.  Chart reviewed.  Case discussed 

with nephrology. No complaints to offer otherwise.  Doing well overall. 





Review of Systems


10-point ROS is otherwise unremarkable





Physical Examination





- Vital Signs


Temperature: 97.0 F


Blood Pressure: 146/69


Pulse: 70


Respirations: 18


Pulse Ox (%): 93





- Physical Exam


General: Alert, Mild distress, Obese


HEENT: Atraumatic, PERRLA, EOMI


Neck: Supple, JVD not distended


Respiratory: Normal air movement, Expiratory wheezes, Inspiratory wheezes


Cardiovascular: Regular rate/rhythm, Normal S1 S2


Gastrointestinal: Normal bowel sounds, No tenderness, Ascites


Musculoskeletal: No tenderness, Swelling


Integumentary: No rashes


Neurological: Normal speech, Normal tone, Normal affect


Lymphatics: No axilla or inguinal lymphadenopathy





- Studies


Laboratory Data (last 24 hrs)





01/04/19 05:09: Sodium 139, Potassium 4.8, BUN 41 H, Creatinine 1.87 H, Glucose 

115 H, Phosphorus 4.6, Magnesium 2.8 H, Total Bilirubin 0.9, AST 14 L, ALT 9 L, 

Alkaline Phosphatase 137 H


01/04/19 05:09: WBC 5.0  D, Hgb 11.0 L, Hct 34.9 L, Plt Count 173


01/03/19 : Sodium Cancelled, Potassium Cancelled, BUN Cancelled, Creatinine 

Cancelled, Glucose Cancelled


01/03/19 18:00: Sodium 135 L, Potassium 5.2 H, BUN 42 H, Creatinine 2.01 H, 

Glucose 298 H


01/03/19 13:50: Sodium 133 L, Potassium 5.8 H*, BUN 40 H, Creatinine 1.95 H, 

Glucose 302 H





Microbiology Data (last 24 hrs): 








01/02/19 06:45   Catheterized Urine   [place] Count - Final


01/02/19 06:45   Catheterized Urine    - Final





Medications List Reviewed: Yes





Assessment And Plan





- Current Problems (Diagnosis)


(1) Hyperkalemia


Current Visit: Yes   Status: Acute   


Plan: 


Hyperkalemia most likely secondary to volume overload.  No EKG changes noted.  

Improved today


-potassium within normal limits today.  Will continue to monitor


-continue with IV Lasix at this time as well.  


-Patient placed on a moderate insulin sliding scale as well.


-nephrology consulted.  Awaiting recommendations at this time.








(2) Abdominal pain


Current Visit: Yes   Status: Chronic   


Plan: 


Abdominal pain most likely secondary to anasarca.  Improved today


-continue with IV Lasix 


-abdominal CT negative for any acute abnormality


-encourage patient to ambulate at this time


Qualifiers: 


   Abdominal location: generalized   Qualified Code(s): R10.84 - Generalized 

abdominal pain   





(3) Anasarca


Onset Date: 10/01/18   Current Visit: No   Status: Acute   


Plan: 


Anasarca most likely secondary to congestive heart failure versus CKD. 


-continue with IV Lasix at this time











(4) Hypervolemia


Onset Date: 10/01/18   Current Visit: No   Status: Acute   


Plan: 


Hyperkalemia most likely secondary to CHF versus CKD


-continue with IV Lasix at this time


-nephrology has been consulted.  Awaiting recommendations at this time


-will follow up with nephrology regarding initiation of dialysis versus 

increasing Lasix dosage at home.


-currently peripheral edema along with pleural effusion improving.


Qualifiers: 


   Hypervolemia type: other   Qualified Code(s): E87.79 - Other fluid overload 

  





(5) CKD (chronic kidney disease)


Onset Date: 09/08/15   Current Visit: No   Status: Chronic   


Plan: 


Acute on chronic kidney disease most likely secondary to hypervolemia


-IV Lasix at this time


-renal ultrasound negative for any acute disease however positive for chronic 

parenchymal disease


-nephrology consulted.  Awaiting recommendations at this time


Qualifiers: 


   Chronic kidney disease stage: stage 3 (moderate)   Qualified Code(s): N18.3 

- Chronic kidney disease, stage 3 (moderate)   





(6) Atrial fibrillation


Onset Date: 10/12/17   Current Visit: No   Status: Chronic   


Qualifiers: 


   Atrial fibrillation type: paroxysmal   Qualified Code(s): I48.0 - Paroxysmal 

atrial fibrillation   





(7) CHF (congestive heart failure)


Onset Date: 05/15/15   Current Visit: No   Status: Chronic   


Qualifiers: 


   Heart failure type: systolic   Heart failure chronicity: acute on chronic   

Qualified Code(s): I50.23 - Acute on chronic systolic (congestive) heart 

failure   





(8) Depression with anxiety


Onset Date: 09/17/18   Current Visit: No   Status: Chronic   





(9) GERD (gastroesophageal reflux disease)


Onset Date: 10/12/17   Current Visit: No   Status: Chronic   


Qualifiers: 


   Esophagitis presence: without esophagitis   Qualified Code(s): K21.9 - Gastro

-esophageal reflux disease without esophagitis   





(10) Hypertension


Onset Date: 10/12/17   Current Visit: No   Status: Chronic   


Qualifiers: 


   Hypertension type: essential hypertension 





(11) Type 2 diabetes mellitus


Onset Date: 10/12/17   Current Visit: No   Status: Chronic   


Qualifiers: 


   Diabetes mellitus long term insulin use: with long term use   Diabetes 

mellitus complication status: without complication   Qualified Code(s): E11.9 - 

Type 2 diabetes mellitus without complications; Z79.4 - Long term (current) use 

of insulin   





- Plan





Pending clinical improvement at this time.

## 2019-01-04 NOTE — P.CNS
Date of Consult: 01/04/19


Reason for Consult: CKD and hyperkalemia 


Chief Complaint: abdominal distension 


History of Present Illness: 





A 83-year-old female with significant PMHX of  DM 2, hypertension, atrial 

fibrillation, Hx of remote alcohol abuse and CHD 


Pt presnted for abdominal distention and SOB 


pt has multiple admission for similar symptoms 


on lasix 40mg po bid at home 


had w/u in the past for cirrhosis 


doesn't f/u with hepatologist or nephrologist 


no chest pain, palpitation, nausea or vomiting  


in ER K was 6.5, Cr 1.8 














Allergies





amitriptyline Allergy (Verified 09/11/18 22:33)


 Unknown


rosiglitazone maleate [From Avandia] Allergy (Verified 09/11/18 22:33)


 Anaphylaxis


sitagliptin phosphate [From Januvia] Allergy (Verified 09/11/18 22:33)


 Nausea/Vomiting


propafenone HCl [From Rythmol] Adverse Reaction (Verified 09/11/18 22:33)


 Shortness of breath


Tape Allergy (Uncoded 09/11/18 22:33)


 Itching/Hives/Rash





Home Medications: 








ALPRAZolam [Alprazolam] 1 tab PO BEDTIME 01/02/19 


Apixaban [Eliquis] 1 tab PO BID 01/02/19 


Atorvastatin Calcium [Lipitor] 1 tab PO BEDTIME 01/02/19 


Carvedilol 1 tab PO BID 01/02/19 


Furosemide 1 tab PO DAILY 01/02/19 


Gabapentin 1 cap PO TID 01/02/19 


Lansoprazole 1 cap PO DAILY 01/02/19 


Sertraline HCl 1 tab PO DAILY 01/02/19 








- Past Medical/Surgical History


Diabetic: Yes


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


-: DM


-: HTN


-: Atrial fibrillation


-: Congestive heart failure


-: hyperlipidemia


-: Kidney disease


-: pneumonia


-: depression/anxiety


-: Pacemaker


-: Cataract sx


-: R. leg fracture


-: left hip surgery





- Family History


  ** Mother


Medical History: Cancer


Notes: cervical cancer





  ** Father


Medical History: Heart disease, Diabetes





- Social History


Smoking Status: Unknown if ever smoked


Alcohol use: No


CD- Drugs: No


Caffeine use: Yes


Place of Residence: Home





Physical Examination














Temp Pulse Resp BP Pulse Ox


 


 97.0 F   70   18   146/69 H  93 


 


 01/04/19 11:23  01/04/19 11:23  01/04/19 11:23  01/04/19 11:23  01/04/19 11:23








General: In no apparent distress, Oriented x3


HEENT: Atraumatic


Neck: Supple, Without JVD or thyroid abnormality


Respiratory: Clear to auscultation bilaterally, Normal air movement


Cardiovascular: No edema, Regular rate/rhythm, Normal S1 S2


Gastrointestinal: Soft and benign, Distended


Integumentary: No rashes


Laboratory Data (last 24 hrs)





01/04/19 05:09: Sodium 139, Potassium 4.8, BUN 41 H, Creatinine 1.87 H, Glucose 

115 H, Phosphorus 4.6, Magnesium 2.8 H, Total Bilirubin 0.9, AST 14 L, ALT 9 L, 

Alkaline Phosphatase 137 H


01/04/19 05:09: WBC 5.0  D, Hgb 11.0 L, Hct 34.9 L, Plt Count 173


01/03/19 18:00: Sodium 135 L, Potassium 5.2 H, BUN 42 H, Creatinine 2.01 H, 

Glucose 298 H


01/03/19 13:50: Sodium 133 L, Potassium 5.8 H*, BUN 40 H, Creatinine 1.95 H, 

Glucose 302 H








- Problems


(1) Hyperkalemia


Current Visit: Yes   Status: Acute   





(2) Hypervolemia


Onset Date: 10/01/18   Current Visit: No   Status: Acute   


Qualifiers: 


   Hypervolemia type: other   Qualified Code(s): E87.79 - Other fluid overload 

  





(3) Pleural effusion


Onset Date: 10/01/18   Current Visit: No   Status: Acute   





(4) CKD (chronic kidney disease)


Onset Date: 09/08/15   Current Visit: No   Status: Chronic   


Qualifiers: 


   Chronic kidney disease stage: stage 3 (moderate)   Qualified Code(s): N18.3 

- Chronic kidney disease, stage 3 (moderate)   


Conclusions/Impression: 





This is a 83-year-old female with significant past medical history of diabetes, 

hypertension, atrial fibrillation, congestive heart failure who presented to 

the ED complaining of abdominal pain and shortness of breath.  Patient stated 

that she has been having this abdominal pain on her left lower quadrant that 

has been bothering her for quite some time.  Patient had multiple hospital 

visits for the similar abdominal pain.  Patient denies having any nausea 

vomiting or any other associated symptoms with the abdominal pain.  States that 

she has been having some leg cramping is along with increase in the edema on 

the bilateral lower extremity as well.  Patient was recently admitted to the 

hospital for similar complaints.  Extensive workup was done at that time and 

patient was then discharged home under stable condition after which was given 

IV Lasix.


Abd CT: pleural , pericardial effusion and ascitis \











CKD IV 


Cr at baseline 


Due to DM 


US: echogenic kidneys 


will change diet to renal 


renal dose meds








hyperkalemia


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 


Mg slightly elevated 


 TSH 1.9


low K diet 


kaeyxalate 15mg po Qother day as an OP 








abdominal distension 


Abd CT : no acute pathology 


need GI f/u 








lt renal cyst 


stabel 








Lt adrenal myelolipoma 








Anasarca


llow alb 


UA: no prot


will order UPC


elevated ALP , will order GGT 








DM 


as per primaery

## 2019-01-04 NOTE — RAD REPORT
EXAM DESCRIPTION:  US - Renal Ultrasound-Complete - 1/4/2019 8:44 am

 

CLINICAL HISTORY:  . Chronic renal disease. Left renal mass

 

COMPARISON:  January 2nd CAT scan

 

FINDINGS:  The right kidney measures 10 cm with an increased echotexture.

 

The left kidney measures cm with an increased echotexture. 3.4 centimeter cyst extends off of the low
er pole

 

Renal cortical thinning. Vascular renal calcifications

 

3.2 centimeter echogenic left adrenal mass consistent with a myelolipoma

 

Hydronephrosis is not seen. A small amount of ascites

 

IMPRESSION:  3.4 centimeter left renal cyst

 

Increased renal echotexture consistent with parenchymal disease

## 2019-01-04 NOTE — RAD REPORT
EXAM DESCRIPTION:  [facility] Single View1/4/2019 6:31 am

 

CLINICAL HISTORY:  Chest pain

 

COMPARISON:  September 2018

 

FINDINGS:   5 centimeter triangular opacity is without significant change from prior exam.

 

Mild bilateral interstitial lung opacities.

 

Heart is moderately enlarged. Pacemaker leads are in place

 

IMPRESSION:  Mild bilateral interstitial lung opacities may represent mild interstitial pulmonary radha
ma

 

Triangular right lung opacity stable from September 2018

## 2019-01-05 LAB
ALBUMIN SERPL BCP-MCNC: 2.7 G/DL (ref 3.4–5)
ALP SERPL-CCNC: 142 U/L (ref 45–117)
ALT SERPL W P-5'-P-CCNC: 10 U/L (ref 12–78)
ANISOCYTOSIS BLD QL: (no result)
AST SERPL W P-5'-P-CCNC: 11 U/L (ref 15–37)
BUN BLD-MCNC: 42 MG/DL (ref 7–18)
GLUCOSE SERPLBLD-MCNC: 162 MG/DL (ref 74–106)
HCT VFR BLD CALC: 33.3 % (ref 36–45)
LYMPHOCYTES # SPEC AUTO: 1 K/UL (ref 0.7–4.9)
MAGNESIUM SERPL-MCNC: 2.4 MG/DL (ref 1.8–2.4)
MORPHOLOGY BLD-IMP: (no result)
PMV BLD: 8 FL (ref 7.6–11.3)
POTASSIUM SERPL-SCNC: 4.5 MMOL/L (ref 3.5–5.1)
RBC # BLD: 4.08 M/UL (ref 3.86–4.86)

## 2019-01-05 PROCEDURE — 5A1935Z RESPIRATORY VENTILATION, LESS THAN 24 CONSECUTIVE HOURS: ICD-10-PCS

## 2019-01-05 PROCEDURE — 0BH17EZ INSERTION OF ENDOTRACHEAL AIRWAY INTO TRACHEA, VIA NATURAL OR ARTIFICIAL OPENING: ICD-10-PCS

## 2019-01-05 PROCEDURE — 5A12012 PERFORMANCE OF CARDIAC OUTPUT, SINGLE, MANUAL: ICD-10-PCS

## 2019-01-05 RX ADMIN — GABAPENTIN SCH MG: 100 CAPSULE ORAL at 21:17

## 2019-01-05 RX ADMIN — ALPRAZOLAM SCH MG: 0.5 TABLET ORAL at 21:17

## 2019-01-05 RX ADMIN — HUMAN INSULIN SCH UNIT: 100 INJECTION, SOLUTION SUBCUTANEOUS at 13:22

## 2019-01-05 RX ADMIN — GABAPENTIN SCH MG: 100 CAPSULE ORAL at 08:49

## 2019-01-05 RX ADMIN — HUMAN INSULIN SCH: 100 INJECTION, SOLUTION SUBCUTANEOUS at 07:30

## 2019-01-05 RX ADMIN — HUMAN INSULIN SCH UNIT: 100 INJECTION, SOLUTION SUBCUTANEOUS at 21:18

## 2019-01-05 RX ADMIN — Medication SCH ML: at 21:24

## 2019-01-05 RX ADMIN — CARVEDILOL SCH MG: 3.12 TABLET, FILM COATED ORAL at 21:17

## 2019-01-05 RX ADMIN — ALBUMIN (HUMAN) SCH MG: 5 SOLUTION INTRAVENOUS at 08:48

## 2019-01-05 RX ADMIN — ALBUMIN (HUMAN) SCH MG: 5 SOLUTION INTRAVENOUS at 17:06

## 2019-01-05 RX ADMIN — GABAPENTIN SCH MG: 100 CAPSULE ORAL at 13:22

## 2019-01-05 RX ADMIN — SERTRALINE HYDROCHLORIDE SCH MG: 50 TABLET ORAL at 08:49

## 2019-01-05 RX ADMIN — PANTOPRAZOLE SODIUM SCH MG: 40 TABLET, DELAYED RELEASE ORAL at 08:48

## 2019-01-05 RX ADMIN — APIXABAN SCH MG: 5 TABLET, FILM COATED ORAL at 08:49

## 2019-01-05 RX ADMIN — Medication SCH ML: at 08:49

## 2019-01-05 RX ADMIN — HUMAN INSULIN SCH UNIT: 100 INJECTION, SOLUTION SUBCUTANEOUS at 17:05

## 2019-01-05 RX ADMIN — CARVEDILOL SCH MG: 3.12 TABLET, FILM COATED ORAL at 08:48

## 2019-01-05 RX ADMIN — ATORVASTATIN CALCIUM SCH MG: 40 TABLET, FILM COATED ORAL at 21:18

## 2019-01-05 RX ADMIN — APIXABAN SCH MG: 2.5 TABLET, FILM COATED ORAL at 21:18

## 2019-01-05 NOTE — P.PN
Subjective


Date of Service: 01/05/19


Chief Complaint: abdominal distension 





Patient seen and examined at bedside with RN.  Chart reviewed.  Case discussed 

with nephrology. No complaints to offer otherwise.  Doing well overall. 





Review of Systems


10-point ROS is otherwise unremarkable





Physical Examination





- Vital Signs


Temperature: 97.6 F


Blood Pressure: 109/58


Pulse: 70


Respirations: 18


Pulse Ox (%): 100





- Physical Exam


General: Alert, In no apparent distress


HEENT: Atraumatic, PERRLA, EOMI


Neck: Supple, JVD not distended


Respiratory: Clear to auscultation bilaterally, Normal air movement


Cardiovascular: Regular rate/rhythm, Normal S1 S2


Gastrointestinal: Normal bowel sounds, No tenderness


Musculoskeletal: No tenderness


Integumentary: No rashes


Neurological: Normal speech, Normal tone, Normal affect


Lymphatics: No axilla or inguinal lymphadenopathy





- Studies


Medications List Reviewed: Yes





Assessment And Plan





- Current Problems (Diagnosis)


(1) Hyperkalemia


Current Visit: Yes   Status: Acute   


Plan: 


Hyperkalemia most likely secondary to volume overload.  No EKG changes noted.  

Improved today


-potassium within normal limits today.  Will continue to monitor


-continue with IV Lasix at this time as well.  


-Patient placed on a moderate insulin sliding scale as well.


-nephrology consulted.  Awaiting recommendations at this time.








(2) Abdominal pain


Current Visit: Yes   Status: Chronic   


Plan: 


Abdominal pain most likely secondary to anasarca.  Improved today


-continue with IV Lasix 


-abdominal CT negative for any acute abnormality


-encourage patient to ambulate at this time


Qualifiers: 


   Abdominal location: generalized   Qualified Code(s): R10.84 - Generalized 

abdominal pain   





(3) Anasarca


Onset Date: 10/01/18   Current Visit: No   Status: Acute   


Plan: 


Anasarca most likely secondary to congestive heart failure versus CKD. 


-continue with IV Lasix at this time











(4) Hypervolemia


Onset Date: 10/01/18   Current Visit: No   Status: Acute   


Plan: 


Hyperkalemia most likely secondary to CHF versus CKD


-continue with IV Lasix at this time


-nephrology has been consulted.  Awaiting recommendations at this time


-will follow up with nephrology regarding initiation of dialysis versus 

increasing Lasix dosage at home.


-currently peripheral edema along with pleural effusion improving.


Qualifiers: 


   Hypervolemia type: other   Qualified Code(s): E87.79 - Other fluid overload 

  





(5) CKD (chronic kidney disease)


Onset Date: 09/08/15   Current Visit: No   Status: Chronic   


Plan: 


Acute on chronic kidney disease most likely secondary to hypervolemia.  BUN and 

creatinine at baseline today.


-IV Lasix at this time


-renal ultrasound negative for any acute disease however positive for chronic 

parenchymal disease


-nephrology consulted.  Recommendations appreciated at this time


Qualifiers: 


   Chronic kidney disease stage: stage 3 (moderate)   Qualified Code(s): N18.3 

- Chronic kidney disease, stage 3 (moderate)   





(6) Atrial fibrillation


Onset Date: 10/12/17   Current Visit: No   Status: Chronic   


Qualifiers: 


   Atrial fibrillation type: paroxysmal   Qualified Code(s): I48.0 - Paroxysmal 

atrial fibrillation   





(7) CHF (congestive heart failure)


Onset Date: 05/15/15   Current Visit: No   Status: Chronic   


Qualifiers: 


   Heart failure type: systolic   Heart failure chronicity: acute on chronic   

Qualified Code(s): I50.23 - Acute on chronic systolic (congestive) heart 

failure   





(8) Depression with anxiety


Onset Date: 09/17/18   Current Visit: No   Status: Chronic   





(9) GERD (gastroesophageal reflux disease)


Onset Date: 10/12/17   Current Visit: No   Status: Chronic   


Qualifiers: 


   Esophagitis presence: without esophagitis   Qualified Code(s): K21.9 - Gastro

-esophageal reflux disease without esophagitis   





(10) Hypertension


Onset Date: 10/12/17   Current Visit: No   Status: Chronic   


Qualifiers: 


   Hypertension type: essential hypertension 





(11) Type 2 diabetes mellitus


Onset Date: 10/12/17   Current Visit: No   Status: Chronic   


Qualifiers: 


   Diabetes mellitus long term insulin use: with long term use   Diabetes 

mellitus complication status: without complication   Qualified Code(s): E11.9 - 

Type 2 diabetes mellitus without complications; Z79.4 - Long term (current) use 

of insulin   





- Plan





Pending clinical improvement at this time.


Discharge Plan: Home


Plan to discharge in: 48 Hours





- Code Status/Comfort Care


Code Status Assessed: Yes


Critical Care: No

## 2019-01-05 NOTE — P.PN
Subjective


Date of Service: 01/05/19


Chief Complaint: abdominal distension 


Subjective: Improving





No new complaints 


K 4.5


Cr stable 


high ALP: F/U GGT , PTh 277 


will check CK and Cortsiol level 








Physical Examination





- Vital Signs


Temperature: 97.6 F


Blood Pressure: 127/59


Pulse: 70


Respirations: 18


Pulse Ox (%): 100





- Physical Exam


General: In no apparent distress, Oriented x3


HEENT: Atraumatic


Neck: Supple, Without JVD or thyroid abnormality


Respiratory: Clear to auscultation bilaterally, Normal air movement


Cardiovascular: No edema, Regular rate/rhythm, Normal S1 S2, No rubs, No murmurs


Gastrointestinal: Soft and benign, Distended





- Studies


Microbiology Data (last 24 hrs): 








01/02/19 06:45   Catheterized Urine   [place] Count - Final


01/02/19 06:45   Catheterized Urine    - Final





Medications List Reviewed: Yes





Assessment And Plan





- Current Problems (Diagnosis)


(1) Hyperkalemia


Current Visit: Yes   Status: Acute   





(2) Hypervolemia


Onset Date: 10/01/18   Current Visit: No   Status: Acute   


Qualifiers: 


   Hypervolemia type: other   Qualified Code(s): E87.79 - Other fluid overload 

  





(3) Pleural effusion


Onset Date: 10/01/18   Current Visit: No   Status: Acute   





(4) CKD (chronic kidney disease)


Onset Date: 09/08/15   Current Visit: No   Status: Chronic   


Qualifiers: 


   Chronic kidney disease stage: stage 3 (moderate)   Qualified Code(s): N18.3 

- Chronic kidney disease, stage 3 (moderate)   





- Plan





This is a 83-year-old female with significant past medical history of diabetes, 

hypertension, atrial fibrillation, congestive heart failure who presented to 

the ED complaining of abdominal pain and shortness of breath.  Patient stated 

that she has been having this abdominal pain on her left lower quadrant that 

has been bothering her for quite some time.  Patient had multiple hospital 

visits for the similar abdominal pain.  Patient denies having any nausea 

vomiting or any other associated symptoms with the abdominal pain.  States that 

she has been having some leg cramping is along with increase in the edema on 

the bilateral lower extremity as well.  Patient was recently admitted to the 

hospital for similar complaints.  Extensive workup was done at that time and 

patient was then discharged home under stable condition after which was given 

IV Lasix.


Abd CT: pleural , pericardial effusion and ascitis \











CKD IV 


Cr at baseline 


Due to DM 


US: echogenic kidneys 


renal dose meds








hyperkalemia


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 


Mg slightly elevated 


 TSH 1.9, will check Cortisol and CK level 


low K diet 


kaeyxalate 15mg po Qother day as an OP 








abdominal distension 


Abd CT : no acute pathology 


need GI f/u as an OP 








lt renal cyst 


stable 








Lt adrenal myelolipoma 








Anasarca


low alb 


UA: no prot


will order UPC


need to increase lasix dose on discharge





elevated ALP , will order GGT 





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  





DM 


as per primaery

## 2019-01-06 LAB
ALBUMIN SERPL BCP-MCNC: 2.7 G/DL (ref 3.4–5)
ALP SERPL-CCNC: 145 U/L (ref 45–117)
ALT SERPL W P-5'-P-CCNC: 12 U/L (ref 12–78)
ANISOCYTOSIS BLD QL: (no result)
AST SERPL W P-5'-P-CCNC: 18 U/L (ref 15–37)
BLD SMEAR INTERP: (no result)
BUN BLD-MCNC: 44 MG/DL (ref 7–18)
COHGB MFR BLDA: 2.5 % (ref 0–1.5)
CREAT UR-SCNC: 70 MG/DL (ref 20–320)
GLUCOSE SERPLBLD-MCNC: 199 MG/DL (ref 74–106)
GLUCOSE SERPLBLD-MCNC: 207 MG/DL (ref 74–106)
GLUCOSE SERPLBLD-MCNC: 213 MG/DL (ref 74–106)
HCT VFR BLD CALC: 34.9 % (ref 36–45)
LYMPHOCYTES # SPEC AUTO: 0.6 K/UL (ref 0.7–4.9)
MAGNESIUM SERPL-MCNC: 2.2 MG/DL (ref 1.8–2.4)
MAGNESIUM SERPL-MCNC: 2.6 MG/DL (ref 1.8–2.4)
MORPHOLOGY BLD-IMP: (no result)
OXYHGB MFR BLDA: 97.1 % (ref 94–97)
PMV BLD: 8.4 FL (ref 7.6–11.3)
POTASSIUM SERPL-SCNC: 3.9 MMOL/L (ref 3.5–5.1)
POTASSIUM SERPL-SCNC: 4.1 MMOL/L (ref 3.5–5.1)
POTASSIUM SERPL-SCNC: 4.2 MMOL/L (ref 3.5–5.1)
PROT UR-MCNC: 15 MG/DL (ref ?–11.9)
RBC # BLD: 4.31 M/UL (ref 3.86–4.86)
SAO2 % BLDA: 99.9 % (ref 92–98.5)
TROPONIN I: 0.06 NG/ML (ref 0–0.04)

## 2019-01-06 PROCEDURE — 5A09557 ASSISTANCE WITH RESPIRATORY VENTILATION, GREATER THAN 96 CONSECUTIVE HOURS, CONTINUOUS POSITIVE AIRWAY PRESSURE: ICD-10-PCS

## 2019-01-06 RX ADMIN — ALBUMIN (HUMAN) SCH MG: 5 SOLUTION INTRAVENOUS at 10:26

## 2019-01-06 RX ADMIN — GABAPENTIN SCH MG: 100 CAPSULE ORAL at 21:37

## 2019-01-06 RX ADMIN — HUMAN INSULIN SCH: 100 INJECTION, SOLUTION SUBCUTANEOUS at 07:30

## 2019-01-06 RX ADMIN — ATORVASTATIN CALCIUM SCH MG: 40 TABLET, FILM COATED ORAL at 21:35

## 2019-01-06 RX ADMIN — HUMAN INSULIN SCH UNIT: 100 INJECTION, SOLUTION SUBCUTANEOUS at 16:59

## 2019-01-06 RX ADMIN — APIXABAN SCH MG: 2.5 TABLET, FILM COATED ORAL at 21:36

## 2019-01-06 RX ADMIN — CARVEDILOL SCH: 3.12 TABLET, FILM COATED ORAL at 21:00

## 2019-01-06 RX ADMIN — CARVEDILOL SCH MG: 3.12 TABLET, FILM COATED ORAL at 10:26

## 2019-01-06 RX ADMIN — GABAPENTIN SCH MG: 100 CAPSULE ORAL at 10:27

## 2019-01-06 RX ADMIN — SERTRALINE HYDROCHLORIDE SCH MG: 50 TABLET ORAL at 10:26

## 2019-01-06 RX ADMIN — ALBUMIN (HUMAN) SCH MG: 5 SOLUTION INTRAVENOUS at 17:03

## 2019-01-06 RX ADMIN — PANTOPRAZOLE SODIUM SCH: 40 TABLET, DELAYED RELEASE ORAL at 07:30

## 2019-01-06 RX ADMIN — HUMAN INSULIN SCH UNIT: 100 INJECTION, SOLUTION SUBCUTANEOUS at 23:43

## 2019-01-06 RX ADMIN — Medication SCH ML: at 10:27

## 2019-01-06 RX ADMIN — APIXABAN SCH MG: 2.5 TABLET, FILM COATED ORAL at 10:26

## 2019-01-06 RX ADMIN — HUMAN INSULIN SCH UNIT: 100 INJECTION, SOLUTION SUBCUTANEOUS at 13:01

## 2019-01-06 RX ADMIN — GABAPENTIN SCH: 100 CAPSULE ORAL at 14:00

## 2019-01-06 RX ADMIN — ALPRAZOLAM SCH MG: 0.5 TABLET ORAL at 21:34

## 2019-01-06 RX ADMIN — Medication SCH ML: at 21:36

## 2019-01-06 NOTE — RAD REPORT
EXAM DESCRIPTION:  CT - Head Brain Wo Cont - 1/6/2019 3:30 am

 

CLINICAL HISTORY:  cardiopulmonary arrest

Drowsiness

 

COMPARISON:  Head Brain Wo Cont dated 8/25/2017; Head Brain Wo Cont dated 5/4/2016

 

TECHNIQUE:  All CT scans are performed using dose optimization technique as appropriate and may inclu
de automated exposure control or mA/KV adjustment according to patient size.

 

FINDINGS:  No intracranial hemorrhage, hydrocephalus or extra-axial fluid collection.Moderate general
ized brain atrophy is present with moderate periventricular and deep white matter chronic microvascul
ar ischemic changes.No areas of brain edema or evidence of midline shift.

 

The paranasal sinuses and mastoids are clear. The calvarium is intact.

 

IMPRESSION:  No acute intracranial abnormality.

## 2019-01-06 NOTE — P.CNS
Date of Consult: 01/06/19


Reason for Consult: Respiratory failure


Chief Complaint: Respiratory failure


History of Present Illness: 


Patient is 83 years of age with multiple medical problems admitted with 

abdominal pain and shortness of breath apparently she became pulseless 

bradycardic intubated transferred here to the ICU is currently alert 

cooperative responsive shallow respirations








Chest x-ray very abnormal there is a progression of for right hilar infiltrate


Allergies





amitriptyline Allergy (Verified 09/11/18 22:33)


 Unknown


rosiglitazone maleate [From Avandia] Allergy (Verified 09/11/18 22:33)


 Anaphylaxis


sitagliptin phosphate [From Januvia] Allergy (Verified 09/11/18 22:33)


 Nausea/Vomiting


propafenone HCl [From Rythmol] Adverse Reaction (Verified 09/11/18 22:33)


 Shortness of breath


Tape Allergy (Uncoded 09/11/18 22:33)


 Itching/Hives/Rash





Home Medications: 








ALPRAZolam [Alprazolam] 1 tab PO BEDTIME 01/02/19 


Apixaban [Eliquis] 1 tab PO BID 01/02/19 


Atorvastatin Calcium [Lipitor] 1 tab PO BEDTIME 01/02/19 


Carvedilol 1 tab PO BID 01/02/19 


Furosemide 1 tab PO DAILY 01/02/19 


Gabapentin 1 cap PO TID 01/02/19 


Lansoprazole 1 cap PO DAILY 01/02/19 


Sertraline HCl 1 tab PO DAILY 01/02/19 








- Past Medical/Surgical History


Diabetic: Yes


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


-: DM


-: HTN


-: Atrial fibrillation


-: Congestive heart failure


-: hyperlipidemia


-: Kidney disease


-: pneumonia


-: depression/anxiety


-: History of lung cancer treated with radiation on the right side


-: Pacemaker


-: Cataract sx


-: R. leg fracture


-: left hip surgery





- Family History


  ** Mother


Medical History: Cancer


Notes: cervical cancer





  ** Father


Medical History: Heart disease, Diabetes





- Social History


Smoking Status: Unknown if ever smoked


Alcohol use: No


CD- Drugs: No


Caffeine use: Yes


Place of Residence: Home





Review of Systems


is unable to be obtained





Physical Examination














Temp Pulse Resp BP Pulse Ox


 


 97.2 F   70   14   127/63   95 


 


 01/06/19 04:00  01/06/19 08:00  01/06/19 08:00  01/06/19 08:00  01/06/19 08:00








General: Alert, Cooperative


Neck: Supple


Respiratory: Clear to auscultation bilaterally, Diminished


Cardiovascular: No edema, Normal S1 S2


Gastrointestinal: Normal bowel sounds, Non-distended


Integumentary: Other (She has an ulcer in the left calf)





- Problems


(1) Respiratory failure


Current Visit: Yes   Status: Acute   


Plan: 


Patient is 83 years of age was found bradycardic unresponsive pulseless 

intubated transferred here to the ICU currently she is alert responsive 

cooperative shallow respirations chronic baseline renal insufficiency history 

of diabetes congestive heart failure no evidence of sepsis over the chest x-

rays very abnormal with his right triangular opacity patient will need a CT 

scan of the chest patient is congestive heart failure Dc IV fluids possible 

wean and extubate today


Qualifiers: 


   Chronicity: acute 





(2) Lung cancer


Current Visit: Yes   Status: Acute   


Plan: 


Patient has a very large right-sided hilar mass suspicious of lung cancer 

according to the patient she was diagnosed and treated in [place] and [place] 

with radiation will try and obtain records and discussed with daughter 

regarding further biopsy


Qualifiers: 


   Laterality: right

## 2019-01-06 NOTE — P.PN
Subjective


Date of Service: 01/06/19


Chief Complaint: Respiratory failure





No new complaints 


K normal 


Cr stable 


high ALP: F/U GGT , PTh 277 


Pt with was unrepsonsive last night, Cpr for 4 minutes, intubated , now 

extubated 


CXR worsening edema/ infiltrate 


Cont Lasix 


F/U chest CT 


UO ~50ml/hr  


 








Physical Examination





- Vital Signs


Temperature: 97.2 F


Blood Pressure: 127/70


Pulse: 70


Respirations: 15


Pulse Ox (%): 96





- Physical Exam


General: In no apparent distress, Oriented x3


HEENT: Atraumatic


Neck: Supple, Without JVD or thyroid abnormality


Respiratory: Clear to auscultation bilaterally, Normal air movement


Cardiovascular: No edema, Regular rate/rhythm, Normal S1 S2


Gastrointestinal: Soft and benign, Distended





- Studies


Medications List Reviewed: Yes





Assessment And Plan





- Current Problems (Diagnosis)


(1) Hyperkalemia


Current Visit: Yes   Status: Acute   





(2) Hypervolemia


Onset Date: 10/01/18   Current Visit: No   Status: Acute   


Qualifiers: 


   Hypervolemia type: other   Qualified Code(s): E87.79 - Other fluid overload 

  





(3) Pleural effusion


Onset Date: 10/01/18   Current Visit: No   Status: Acute   





(4) CKD (chronic kidney disease)


Onset Date: 09/08/15   Current Visit: No   Status: Chronic   


Qualifiers: 


   Chronic kidney disease stage: stage 3 (moderate)   Qualified Code(s): N18.3 

- Chronic kidney disease, stage 3 (moderate)   





- Plan





This is a 83-year-old female with significant past medical history of diabetes, 

hypertension, atrial fibrillation, congestive heart failure who presented to 

the ED complaining of abdominal pain and shortness of breath.  Patient stated 

that she has been having this abdominal pain on her left lower quadrant that 

has been bothering her for quite some time.  Patient had multiple hospital 

visits for the similar abdominal pain.  Patient denies having any nausea 

vomiting or any other associated symptoms with the abdominal pain.  States that 

she has been having some leg cramping is along with increase in the edema on 

the bilateral lower extremity as well.  Patient was recently admitted to the 

hospital for similar complaints.  Extensive workup was done at that time and 

patient was then discharged home under stable condition after which was given 

IV Lasix.


Abd CT: pleural , pericardial effusion and ascitis \











CKD IV 


Cr at baseline 


Due to DM 


US: echogenic kidneys 


renal dose meds








hyperkalemia


resolved 


likely due to increased Po intake and CKD 


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 


Mg slightly elevated 


 TSH 1.9, will check Cortisol and CK level 


low K diet 


kaeyxalate 15mg po Qother day as an OP 








abdominal distension 


Abd CT : no acute pathology 


need GI f/u as an OP 








lt renal cyst 


stable 








Lt adrenal myelolipoma 








Anasarca, improving 


low alb 


UA: no prot


will order UPC








elevated ALP , will order GGT 





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  





DM 


as per primaery

## 2019-01-06 NOTE — RAD REPORT
EXAM DESCRIPTION:  RAD - Chest Single View - 1/6/2019 12:09 am

 

CLINICAL HISTORY:  code 99; chest tube placement

Chest pain.

 

COMPARISON:  Chest Single View dated 1/4/2019; Abdomen 1 View (KUB) dated 9/28/2018; Chest Single Vie
w dated 9/28/2018; Chest Single View dated 9/13/2018

 

FINDINGS:  Portable technique limits examination quality.

 

Tip of the ET tube is above the dilshad. Extensive bilateral pulmonary opacities are noted likely repr
esenting pneumonia or pulmonary edema. The heart is significantly enlarged. Dual lead pacer device pr
esent.

## 2019-01-06 NOTE — P.PN
Subjective


Date of Service: 01/06/19


Chief Complaint: Respiratory failure





Patient seen and examined at bedside with RN.  Chart reviewed.  Case discussed 

with nephrology.  Patient overnight had cardiopulmonary arrest was resuscitated 

successfully and intubated.  Currently in the ICU patient has been extubated 

doing well overall.  On room air saturating well.  No complaints to offer at 

this time as well.





Review of Systems


10-point ROS is otherwise unremarkable





Physical Examination





- Vital Signs


Temperature: 97.2 F


Blood Pressure: 127/70


Pulse: 70


Respirations: 15


Pulse Ox (%): 96





- Physical Exam


General: Alert, In no apparent distress


HEENT: Atraumatic, PERRLA, EOMI


Neck: Supple, JVD not distended


Respiratory: Normal air movement, Crackles/rales, Rhonchi/gurgles


Cardiovascular: Regular rate/rhythm, Normal S1 S2


Gastrointestinal: Normal bowel sounds, No tenderness


Musculoskeletal: No tenderness


Integumentary: No rashes


Neurological: Normal speech, Normal tone, Normal affect


Lymphatics: No axilla or inguinal lymphadenopathy





- Studies


Medications List Reviewed: Yes





Assessment And Plan





- Current Problems (Diagnosis)


(1) Cardiopulmonary arrest with successful resuscitation


Current Visit: Yes   Status: Resolved   


Plan: 


Status post cardiopulmonary arrest with successful resuscitation


-currently extubated doing well overall.  Normal sinus rhythm as well.


-will get echocardiogram at this time.


-cardiology and pulmonology has been consulted.  Appreciated recommendations at 

this time


-most likely patient's cardiopulmonary arrest secondary to syncopal episode and 

bradycardia


-will hold medication that could lower her heart rate.








(2) Hyperkalemia


Current Visit: Yes   Status: Acute   


Plan: 


Hyperkalemia most likely secondary to volume overload


-currently lab work is pending at this time


-on cardiac tele


-continue monitor closely.


-IV Lasix at this time as well








(3) Abdominal pain


Current Visit: Yes   Status: Chronic   


Plan: 


Abdominal pain most likely secondary to anasarca.  Improved today


-continue with IV Lasix 


-abdominal CT negative for any acute abnormality


-encourage patient to ambulate at this time


Qualifiers: 


   Abdominal location: generalized   Qualified Code(s): R10.84 - Generalized 

abdominal pain   





(4) Hypervolemia


Onset Date: 10/01/18   Current Visit: No   Status: Acute   


Plan: 


Hyperkalemia most likely secondary to CHF versus CKD


-continue with IV Lasix at this time


-nephrology has been consulted.  Recommendations appreciated at this time








Qualifiers: 


   Hypervolemia type: other   Qualified Code(s): E87.79 - Other fluid overload 

  





(5) CKD (chronic kidney disease)


Onset Date: 09/08/15   Current Visit: No   Status: Chronic   


Plan: 


Acute on chronic kidney disease most likely secondary to hypervolemia.  


-lab work pending at this time


-IV Lasix at this time


-renal ultrasound negative for any acute disease however positive for chronic 

parenchymal disease


-nephrology consulted.  Recommendations appreciated at this time


Qualifiers: 


   Chronic kidney disease stage: stage 3 (moderate)   Qualified Code(s): N18.3 

- Chronic kidney disease, stage 3 (moderate)   





(6) Atrial fibrillation


Onset Date: 10/12/17   Current Visit: No   Status: Chronic   


Qualifiers: 


   Atrial fibrillation type: paroxysmal   Qualified Code(s): I48.0 - Paroxysmal 

atrial fibrillation   





(7) CHF (congestive heart failure)


Onset Date: 05/15/15   Current Visit: No   Status: Chronic   


Qualifiers: 


   Heart failure type: systolic   Heart failure chronicity: acute on chronic   

Qualified Code(s): I50.23 - Acute on chronic systolic (congestive) heart 

failure   





(8) Depression with anxiety


Onset Date: 09/17/18   Current Visit: No   Status: Chronic   





(9) GERD (gastroesophageal reflux disease)


Onset Date: 10/12/17   Current Visit: No   Status: Chronic   


Qualifiers: 


   Esophagitis presence: without esophagitis   Qualified Code(s): K21.9 - Gastro

-esophageal reflux disease without esophagitis   





(10) Hypertension


Onset Date: 10/12/17   Current Visit: No   Status: Chronic   


Qualifiers: 


   Hypertension type: essential hypertension 





(11) Type 2 diabetes mellitus


Onset Date: 10/12/17   Current Visit: No   Status: Chronic   


Qualifiers: 


   Diabetes mellitus long term insulin use: with long term use   Diabetes 

mellitus complication status: without complication   Qualified Code(s): E11.9 - 

Type 2 diabetes mellitus without complications; Z79.4 - Long term (current) use 

of insulin   





- Plan





Pending clinical improvement at this time.  Will monitor closely here in the 

ICU for next 24 hr


Discharge Plan: Home


Plan to discharge in: 48 Hours





- Code Status/Comfort Care


Code Status Assessed: Yes


Critical Care: Yes

## 2019-01-06 NOTE — RAD REPORT
EXAM DESCRIPTION:  CT - Thorax Wo Con

 

CLINICAL HISTORY:  Chest pain

Abnormal chest x-ray

 

COMPARISON:  THORAX WO CONTRAST dated 5/21/2015

 

FINDINGS:  Bilateral pulmonary opacities are noted most compatible with mild pulmonary edema. Scarrin
g is present in the right parahilar region with traction bronchiectasis. Mild linear atelectasis also
 suspected left lung base. Minimal left and small right pleural effusion noted. No pneumothorax.

 

Moderate cardiomegaly is seen with a mild volume of pericardial fluid. Pacer device is in place. Mode
rate aortic arch atherosclerosis.

 

No concerning bony finding. Mild ascites noted in the upper abdomen. Splenomegaly is also seen. Low-d
ensity left adrenal mass is unchanged since comparative study.

 

All CT scans are performed using dose optimization technique as appropriate and may include automated
 exposure control or mA/KV adjustment according to patient size.

 

IMPRESSION:  Mild to moderate CHF/ volume overload pattern is noted.

## 2019-01-06 NOTE — P.PN
Subjective


Date of Service: 01/05/19





Patient had a cardiac arrest.  Patient was out of bed and getting ready to take 

a shower.  She became faint and collapse.  She was slowly moved down to the 

floor.  The CNA and charge nurse were with her.  Code blue was called.  Patient 

was resuscitated.  Patient's CT of the head which did not reveal any acute 

abnormality.  Patient started waking not we sedated her with fentanyl and 

Ativan.  However she is more awake and following commands will go ahead and see 

how she does in a.m. if she is doing well and will go ahead and do a 

spontaneous breathing trial and we will plan to extubate.





Review of Systems


is unable to be obtained





Physical Examination





- Vital Signs


Temperature: 97.2 F


Blood Pressure: 127/63


Pulse: 70


Respirations: 14


Pulse Ox (%): 95





- Physical Exam


General: Other (Intubated and sedated)


HEENT: Atraumatic, Normocephalic


Respiratory: Diminished


Cardiovascular: Regular rate/rhythm, Normal S1 S2, No murmurs


Gastrointestinal: Soft and benign, Non-distended


Musculoskeletal: No clubbing


Neurological: Normal tone, Sensation intact, Cranial nerves 3-12 intact, 

Abnormal gait, Abnormal speech, Abnormal strength





- Studies


Medications List Reviewed: Yes





Assessment & Plan





- Plan





Assessment:


1. Cardiac arrest


2. Respiratory failure





 Plan:


1. Continue mechanical ventilation and sedation as needed


2. Will hold in the morning and allow patient wake up and do a spontaneous 

breathing trial


3. Monitor electrolytes


4. Physical therapy once patient is extubated


5. Repeat chest x-ray


Discharge Plan: Home


Plan to discharge in: Greater than 2 days





- Advance Directives


Does patient have a Living Will: No


Does patient have a Durable POA for Healthcare: No





- Code Status/Comfort Care


Code Status Assessed: Yes


Code Status: Full Code


Critical Care: Yes


Time Spent Managing PTS Care (In Minutes): 60

## 2019-01-07 LAB
ALBUMIN SERPL BCP-MCNC: 2.5 G/DL (ref 3.4–5)
ALP SERPL-CCNC: 125 U/L (ref 45–117)
ALT SERPL W P-5'-P-CCNC: 14 U/L (ref 12–78)
AST SERPL W P-5'-P-CCNC: 18 U/L (ref 15–37)
BUN BLD-MCNC: 48 MG/DL (ref 7–18)
GLUCOSE SERPLBLD-MCNC: 206 MG/DL (ref 74–106)
HCT VFR BLD CALC: 32.3 % (ref 36–45)
LYMPHOCYTES # SPEC AUTO: 0.9 K/UL (ref 0.7–4.9)
MAGNESIUM SERPL-MCNC: 2.4 MG/DL (ref 1.8–2.4)
PMV BLD: 8.3 FL (ref 7.6–11.3)
POTASSIUM SERPL-SCNC: 3.9 MMOL/L (ref 3.5–5.1)
RBC # BLD: 3.97 M/UL (ref 3.86–4.86)

## 2019-01-07 RX ADMIN — SPIRONOLACTONE SCH MG: 25 TABLET, FILM COATED ORAL at 11:15

## 2019-01-07 RX ADMIN — HUMAN INSULIN SCH UNIT: 100 INJECTION, SOLUTION SUBCUTANEOUS at 17:35

## 2019-01-07 RX ADMIN — CARVEDILOL SCH MG: 3.12 TABLET, FILM COATED ORAL at 20:36

## 2019-01-07 RX ADMIN — ALPRAZOLAM SCH MG: 0.5 TABLET ORAL at 20:37

## 2019-01-07 RX ADMIN — ATORVASTATIN CALCIUM SCH MG: 40 TABLET, FILM COATED ORAL at 20:36

## 2019-01-07 RX ADMIN — APIXABAN SCH MG: 2.5 TABLET, FILM COATED ORAL at 09:36

## 2019-01-07 RX ADMIN — ALBUMIN (HUMAN) SCH MG: 5 SOLUTION INTRAVENOUS at 17:34

## 2019-01-07 RX ADMIN — ALBUMIN (HUMAN) SCH MG: 5 SOLUTION INTRAVENOUS at 09:36

## 2019-01-07 RX ADMIN — GABAPENTIN SCH MG: 100 CAPSULE ORAL at 13:54

## 2019-01-07 RX ADMIN — GABAPENTIN SCH MG: 100 CAPSULE ORAL at 09:35

## 2019-01-07 RX ADMIN — APIXABAN SCH MG: 2.5 TABLET, FILM COATED ORAL at 20:36

## 2019-01-07 RX ADMIN — HUMAN INSULIN SCH UNIT: 100 INJECTION, SOLUTION SUBCUTANEOUS at 08:06

## 2019-01-07 RX ADMIN — Medication SCH ML: at 20:36

## 2019-01-07 RX ADMIN — HUMAN INSULIN SCH UNIT: 100 INJECTION, SOLUTION SUBCUTANEOUS at 23:00

## 2019-01-07 RX ADMIN — CARVEDILOL SCH MG: 3.12 TABLET, FILM COATED ORAL at 09:35

## 2019-01-07 RX ADMIN — SPIRONOLACTONE SCH MG: 25 TABLET, FILM COATED ORAL at 20:37

## 2019-01-07 RX ADMIN — GABAPENTIN SCH MG: 100 CAPSULE ORAL at 20:36

## 2019-01-07 RX ADMIN — PANTOPRAZOLE SODIUM SCH MG: 40 TABLET, DELAYED RELEASE ORAL at 09:35

## 2019-01-07 RX ADMIN — Medication SCH ML: at 09:36

## 2019-01-07 RX ADMIN — SERTRALINE HYDROCHLORIDE SCH MG: 50 TABLET ORAL at 09:35

## 2019-01-07 RX ADMIN — HUMAN INSULIN SCH UNIT: 100 INJECTION, SOLUTION SUBCUTANEOUS at 13:51

## 2019-01-07 NOTE — ECHO
HEIGHT: 4 ft 11 in   WEIGHT: 227 lb 0 oz   DATE OF STUDY: 01/07/2019   REFER DR: Kristy Carpenter MD

2-DIMENSIONAL: YES

     M.MODE: YES

 DOPPLER: YES

COLOR FLOW: YES



                    TDS:  NO

PORTABLE: NO

 DEFINITY:  NO

BUBBLE STUDY: NO





DIAGNOSIS:  CARDIOPULMONARY ARREST



CARDIAC HISTORY:  

CATHERIZATION: NO

SURGERY: YES

PROSTHETIC VALVE: NO

PACEMAKER: YES





MEASUREMENTS (cm)

    DIASTOLIC (NORMALS)                 SYSTOLIC (NORMALS)

IVSd                 1.8 (0.6-1.2)                    LA Diam 3.9 (1.9-4.0)     LVEF       
  45-49%  

LVIDd               4.5 (3.5-5.7)                        LVIDs      3.7 (2.0-3.5)     %FS  
        16%

LVPWd             1.7 (0.6-1.2)

Ao Diam           2.8 (2.0-3.7)



2 DIMENSIONAL ASSESSMENT:

RIGHT ATRIUM:                   DILATED

LEFT ATRIUM:       DILATED



RIGHT VENTRICLE:            PACEMAKER CATHETER

LEFT VENTRICLE: LEFT VENTRICULAR HYPERTROPHY CONCENTRIC, SEVERE



TRICUSPID VALVE:             NORMAL

MITRAL VALVE:     NORMAL



PULMONIC VALVE:             NORMAL

AORTIC VALVE:     SCLEROSIS



PERICARDIAL EFFUSION: SMALL

AORTIC ROOT:      NORMAL





LEFT VENTRICULAR WALL MOTION:     MILD GLOBAL HYPOKINESIS.



DOPPLER/COLOR FLOW:     NO AORTIC STENOSIS OR AORTIC REGURGITATION. MILD TRICUSPID 
REGURGITATION, ESTIMATED RIGHT VENTRICULAR SYSTOLIC PRESSURE 63 MMHG. ESTIMATED RIGHT 
ATRIAL PRESSURE 10 MMHG. SEVERE PULMONARY HYPERTENSION. 



COMMENTS:      MILDLY DEPRESSED LEFT VENTRICULAR EJECTION FRACTION. LEFT VENTRICULAR 
HYPERTROPHY. DILATED LEFT ATRIUM AND RIGHT ATRIUM. PACEMAKER IN RIGHT VENTRICLE. AORTIC 
SCLEROSIS WITH NO AORTIC STENOSIS OR REGURGITATION. MILD TRICUSPID REGURGITATION. SEVERE 
PULMONARY HYPERTENSION. SMALL PERICARDIAL EFFUSION. 



TECHNOLOGIST:   LEAH JACKSON RDCS

## 2019-01-07 NOTE — P.PN
Subjective


Date of Service: 01/07/19


Chief Complaint: Abnormal chest x-ray





Patient seen and examined at bedside with RN.  Chart reviewed.  Case discussed 

with nephrology and pulmonology.  Patient did well overall overnight.  No 

complaints to offer.  Chest CT was done consistent with extensive lung disease.

  Possibility of a lung mass.  Patient states that she has been diagnosed with 

lung cancer in the past and has gotten radiation.  All the treatments were done 

at [facility].  Patient is a poor historian.  Most of the history is 

collected by the daughter at bedside.





Review of Systems


10-point ROS is otherwise unremarkable





Physical Examination





- Vital Signs


Temperature: 99.1 F


Blood Pressure: 98/55


Pulse: 70


Respirations: 18


Pulse Ox (%): 95





- Physical Exam


General: Alert, In no apparent distress, Oriented x3


HEENT: Atraumatic, PERRLA, EOMI


Neck: Supple, JVD not distended


Respiratory: Normal air movement, Expiratory wheezes, Inspiratory wheezes, 

Rhonchi/gurgles


Cardiovascular: Regular rate/rhythm, Normal S1 S2


Gastrointestinal: Normal bowel sounds, No tenderness


Musculoskeletal: No tenderness


Integumentary: No rashes


Neurological: Normal speech, Normal tone, Normal affect


Lymphatics: No axilla or inguinal lymphadenopathy





- Studies


Microbiology Data (last 24 hrs): 








01/02/19 12:00   Blood  - Blood   Aerobic Blood Culture - Final


                            No growth in 5 days.


01/02/19 12:00   Blood  - Blood   Anaerobic Blood Culture - Final


                            No growth in 5 days.


01/02/19 06:55   Blood  - Blood   Aerobic Blood Culture - Final


                            No growth in 5 days.


01/02/19 06:55   Blood  - Blood   Anaerobic Blood Culture - Final


                            No growth in 5 days.





Medications List Reviewed: Yes





Assessment And Plan





- Current Problems (Diagnosis)


(1) Lung cancer


Current Visit: Yes   Status: Acute   


Plan: 


History of lung cancer in the past.  Status post radiation.  Patient has been 

seen in [facility] by Dr. KNOX


-CT scan at this time abnormal with possible mass versus extensive 

bronchiectasis


-pulmonology consulted.  Recommendations appreciated at this time.


-records have been requested from [facility] at this time.


-bronchoscopy possible at this time after records have been obtained.


Qualifiers: 


   Laterality: right   Lung location: unspecified part of lung   Qualified Code(

s): C34.91 - Malignant neoplasm of unspecified part of right bronchus or lung   





(2) Cardiopulmonary arrest with successful resuscitation


Current Visit: Yes   Status: Resolved   


Plan: 


Status post cardiopulmonary arrest with successful resuscitation


-currently extubated doing well overall.  Normal sinus rhythm as well.


-echocardiogram done consistent with congestive heart failure with depressed EF 

and diastolic dysfunction.


-small amount of pericardial effusion noted as well.


-cardiology and pulmonology has been consulted.  Appreciated recommendations at 

this time


-most likely patient's cardiopulmonary arrest secondary to syncopal episode and 

bradycardia


-will hold medication that could lower her heart rate.








(3) Abdominal pain


Current Visit: Yes   Status: Chronic   


Plan: 


Abdominal pain most likely secondary to anasarca.  Improved today


-continue with IV Lasix 


-abdominal CT negative for any acute abnormality


-encourage patient to ambulate at this time


Qualifiers: 


   Abdominal location: generalized   Qualified Code(s): R10.84 - Generalized 

abdominal pain   





(4) Hypervolemia


Onset Date: 10/01/18   Current Visit: No   Status: Acute   


Plan: 


Hyperkalemia most likely secondary to CHF versus CKD.  Patient with pericardial 

effusion along with pleural effusion noted on the chest CT.


-continue with IV Lasix at this time


-nephrology has been consulted.  Recommendations appreciated at this time


-cardiology has been consulted.  Recommendation appreciated at this time


-pulmonology has been consulted recommendations appreciated at this time








Qualifiers: 


   Hypervolemia type: other   Qualified Code(s): E87.79 - Other fluid overload 

  





(5) CKD (chronic kidney disease)


Onset Date: 09/08/15   Current Visit: No   Status: Chronic   


Plan: 


Acute on chronic kidney disease most likely secondary to hypervolemia.  


-BUN and creatinine elevated today.  Most likely secondary to Lasix


-IV Lasix at this time


-renal ultrasound negative for any acute disease however positive for chronic 

parenchymal disease


-nephrology consulted.  Recommendations appreciated at this time


Qualifiers: 


   Chronic kidney disease stage: stage 3 (moderate)   Qualified Code(s): N18.3 

- Chronic kidney disease, stage 3 (moderate)   





(6) CHF (congestive heart failure)


Onset Date: 05/15/15   Current Visit: No   Status: Chronic   


Plan: 


History of congestive heart failure, systolic and diastolic


-echo with ejection fraction of 45-49% with global hypokinesis.


-NHYclass 4 heart failure.


-started on spironolactone at this time.


-currently on beta-blocker and Lasix.  Lisinopril and losartan have been on 

hold due to worsening kidney function.


Qualifiers: 


   Heart failure type: systolic   Heart failure chronicity: acute on chronic   

Qualified Code(s): I50.23 - Acute on chronic systolic (congestive) heart 

failure   





(7) Atrial fibrillation


Onset Date: 10/12/17   Current Visit: No   Status: Chronic   


Qualifiers: 


   Atrial fibrillation type: paroxysmal   Qualified Code(s): I48.0 - Paroxysmal 

atrial fibrillation   





(8) Hyperkalemia


Current Visit: Yes   Status: Acute   


Plan: 


Hyperkalemia most likely secondary to volume overload


-potassium in the normal ranges today.


-on cardiac tele


-continue monitor closely.


-IV Lasix at this time as well








(9) Depression with anxiety


Onset Date: 09/17/18   Current Visit: No   Status: Chronic   





(10) GERD (gastroesophageal reflux disease)


Onset Date: 10/12/17   Current Visit: No   Status: Chronic   


Qualifiers: 


   Esophagitis presence: without esophagitis   Qualified Code(s): K21.9 - Gastro

-esophageal reflux disease without esophagitis   





(11) Hypertension


Onset Date: 10/12/17   Current Visit: No   Status: Chronic   


Qualifiers: 


   Hypertension type: essential hypertension 





(12) Type 2 diabetes mellitus


Onset Date: 10/12/17   Current Visit: No   Status: Chronic   


Qualifiers: 


   Diabetes mellitus long term insulin use: with long term use   Diabetes 

mellitus complication status: without complication   Qualified Code(s): E11.9 - 

Type 2 diabetes mellitus without complications; Z79.4 - Long term (current) use 

of insulin   





- Plan





Pending clinical improvement at this time.  Will monitor closely here in the 

ICU for next 24 hr

## 2019-01-07 NOTE — CON
Reason For Consult:  Evaluate postsyncopal event.



History Of Present Illness:  Mrs. Milian is a woman who has severe underlying medical problems.  She i
s in the hospital because of needing a lot of diuresis.  She had anasarca and she had been in the [facility] for at least 2 days, probably closer to 72 hours.  She was asking for assistance to get from he
r bed to the bathroom.  When she stood up, she lost consciousness.  She was eased to the floor.  Some
 CPR was done at some point.  Somebody believed there was no pulse, a few moments later without any s
pecific intervention a pulse was seen, and the blood pressure was in the normal range.  The patient w
kelli up about then.  She was not on telemetry when that happened, but she does have a pacemaker.  Her 
pacemaker is present mostly because of atrial fibrillation and sick sinus syndrome.  She has had atri
al fibrillation and flutter.  She has had lung cancer before.  She has an almost normal ejection frac
tion in the 45-49% range, but she gets edema because of diastolic dysfunction and other things that c
ause the edema.  She has a pacemaker made by [employer].  We can interrogate it and look exact
ly what her rhythm was, but all the rhythm strips show the pacemaker working fine.  She has not had a
ny documented arrhythmia while on telemetry.  Her heart rate is always 70.  Blood pressures of all be
en close to the normal range, but some of them get a little on the low side, 92/66 is one.  She has h
ad one mild temperature elevation of 99.1.



Physical Examination:

General:  She is alert, oriented, pleasant.  She is not in distress. 

Lungs:  Clear. 

Heart:  Exam is within normal limits. 

Extremities:  No edema.  Distal pulses palpable. 



Chest x-ray shows possible multiple areas of pneumonia.  I wonder if she aspirated while she was unco
nscious.



Impression:  The patient probably did not have an actual cardiac arrest.  I think her blood pressure 
got so low somebody could not feel a pulse.  We will wait and see what the pacemaker interrogation te
lls us.  We can look at the rhythm to the minute of when this event happened to see if she ever actua
lly had pacemaker failure around the time of her cardiac arrest.  Potassium was normal.  Creatinine 1
.9.  Blood sugars were around 257.  I think we had transient hypotension, probably from a change in p
osture that caused this.  I will reserve judgment until we see what the pacemaker interrogation shows
.  An echocardiogram has been discussed.  She has LVH, tiny pericardial effusion, almost normal eject
ion fraction.





ADIEL/NATIVIDAD

DD:  01/07/2019 13:00:15Voice ID:  [id number]

DT:  01/07/2019 18:29:03Report ID:  [id number]

## 2019-01-08 LAB
ALBUMIN SERPL BCP-MCNC: 2.8 G/DL (ref 3.4–5)
ALP SERPL-CCNC: 135 U/L (ref 45–117)
ALT SERPL W P-5'-P-CCNC: 21 U/L (ref 12–78)
AST SERPL W P-5'-P-CCNC: 35 U/L (ref 15–37)
BUN BLD-MCNC: 54 MG/DL (ref 7–18)
GLUCOSE SERPLBLD-MCNC: 219 MG/DL (ref 74–106)
HCT VFR BLD CALC: 34 % (ref 36–45)
LYMPHOCYTES # SPEC AUTO: 1 K/UL (ref 0.7–4.9)
MAGNESIUM SERPL-MCNC: 2.5 MG/DL (ref 1.8–2.4)
PMV BLD: 8.6 FL (ref 7.6–11.3)
POTASSIUM SERPL-SCNC: 4.2 MMOL/L (ref 3.5–5.1)
RBC # BLD: 4.17 M/UL (ref 3.86–4.86)

## 2019-01-08 RX ADMIN — TRAMADOL HYDROCHLORIDE PRN MG: 50 TABLET, COATED ORAL at 20:44

## 2019-01-08 RX ADMIN — SPIRONOLACTONE SCH MG: 25 TABLET, FILM COATED ORAL at 20:14

## 2019-01-08 RX ADMIN — ALBUMIN (HUMAN) SCH MLS: 5 SOLUTION INTRAVENOUS at 15:12

## 2019-01-08 RX ADMIN — TRAMADOL HYDROCHLORIDE PRN MG: 50 TABLET, COATED ORAL at 14:41

## 2019-01-08 RX ADMIN — PANTOPRAZOLE SODIUM SCH MG: 40 TABLET, DELAYED RELEASE ORAL at 07:56

## 2019-01-08 RX ADMIN — ACETAMINOPHEN PRN MG: 500 TABLET, FILM COATED ORAL at 07:54

## 2019-01-08 RX ADMIN — ALPRAZOLAM SCH MG: 0.5 TABLET ORAL at 20:11

## 2019-01-08 RX ADMIN — HUMAN INSULIN SCH UNIT: 100 INJECTION, SOLUTION SUBCUTANEOUS at 16:19

## 2019-01-08 RX ADMIN — GABAPENTIN SCH MG: 100 CAPSULE ORAL at 20:11

## 2019-01-08 RX ADMIN — Medication SCH ML: at 20:12

## 2019-01-08 RX ADMIN — GABAPENTIN SCH MG: 100 CAPSULE ORAL at 08:02

## 2019-01-08 RX ADMIN — Medication SCH ML: at 08:03

## 2019-01-08 RX ADMIN — CARVEDILOL SCH MG: 3.12 TABLET, FILM COATED ORAL at 08:02

## 2019-01-08 RX ADMIN — GABAPENTIN SCH MG: 100 CAPSULE ORAL at 14:41

## 2019-01-08 RX ADMIN — HUMAN INSULIN SCH UNIT: 100 INJECTION, SOLUTION SUBCUTANEOUS at 07:55

## 2019-01-08 RX ADMIN — APIXABAN SCH MG: 2.5 TABLET, FILM COATED ORAL at 08:05

## 2019-01-08 RX ADMIN — SERTRALINE HYDROCHLORIDE SCH MG: 50 TABLET ORAL at 08:02

## 2019-01-08 RX ADMIN — HUMAN INSULIN SCH UNIT: 100 INJECTION, SOLUTION SUBCUTANEOUS at 11:19

## 2019-01-08 RX ADMIN — ALBUMIN (HUMAN) SCH MG: 5 SOLUTION INTRAVENOUS at 08:02

## 2019-01-08 RX ADMIN — SPIRONOLACTONE SCH MG: 25 TABLET, FILM COATED ORAL at 08:02

## 2019-01-08 RX ADMIN — APIXABAN SCH MG: 2.5 TABLET, FILM COATED ORAL at 20:11

## 2019-01-08 RX ADMIN — SPIRONOLACTONE SCH: 25 TABLET, FILM COATED ORAL at 15:30

## 2019-01-08 RX ADMIN — HUMAN INSULIN SCH: 100 INJECTION, SOLUTION SUBCUTANEOUS at 21:00

## 2019-01-08 RX ADMIN — ATORVASTATIN CALCIUM SCH MG: 40 TABLET, FILM COATED ORAL at 20:11

## 2019-01-08 RX ADMIN — ACETAMINOPHEN PRN MG: 500 TABLET, FILM COATED ORAL at 12:30

## 2019-01-08 NOTE — PN
Date of Progress Note:  01/08/2019



Subjective:  The patient is still complaining from shortness of breath, blood pressure on the lower s
hali.  The patient had Cabrera been removed yesterday.  After that, her urine output has been decreased.
  Straight cath was placed, obtained 250.



Physical Examination:

Vital Signs:  When I saw the patient, blood pressure 123/66, pulse of 70.  As I mentioned, decreased 
urine output. 

Chest:  Crackles bilateral. 

Heart:  S1, S2.  Regular.  Systolic murmur. 

Abdomen:  Soft.  Dullness on the suprapubic area. 

Extremities:  +1 edema.



Laboratory Data:  WBC 9, H and H 10.5/34, platelet of 165.  Sodium 137, potassium 4.2, bicarb 25, BUN
 54, creatinine 2.5, GFR of 18.  Calcium 8.1, phosphorus 4.4, , protein creatinine 0.2.  Renal
 ultrasound showing 10/11 with renal cyst 3.4 cm on the left.  Repeated chest x-ray showing congestio
n bilateral with cardiomegaly.



Current Medications:  The patient on its include carvedilol 3.125, atorvastatin, gabapentin 100 t.i.d
., Zoloft 50, Lasix p.o., pantoprazole, insulin, and tramadol.



Assessment And Plan:  

1.Acute kidney injury, normal size kidney, multifactorial, cardiorenal/hepatorenal with obstructive 
uropathy secondary to possible neurogenic bladder, worsening kidney function secondary to poor perfus
ion, acute tubular necrosis/cardiorenal over volume with marginal low blood pressure.  I am going to 
go ahead and place the Cabrera back.  I going to place the patient on Lasix drip over the night to avoi
d any low blood pressure and we will follow up with the patient.

2.Hypertension, currently low blood pressure.  I will hold all blood pressure medication and we will
 monitor.

3.Lung cancer, status post radiation.  Follow up with Pulmonary.

4.Cardiac arrest, status post resuscitation.  Follow up with Cardiology.





MA/NATIVIDAD

DD:  01/08/2019 14:21:26Voice ID:  [id number]

DT:  01/08/2019 20:01:51Report ID:  [id number]

## 2019-01-08 NOTE — RAD REPORT
EXAM DESCRIPTION:  Aimee Single View1/8/2019 1:12 pm

 

CLINICAL HISTORY:  Shortness breath

 

COMPARISON:  [facility]

 

FINDINGS:  Right lung opacity has mildly improved.

 

Minimal left lung opacities are unchanged. Heart remains enlarged. Pacemaker leads are in place.

 

Small right pleural effusion is present

## 2019-01-08 NOTE — P.PN
Subjective


Date of Service: 01/08/19


Chief Complaint: Abnormal chest x-ray





Patient seen and examined at bedside with RN.  Chart reviewed.  Case discussed 

with nephrology and pulmonology.  Patient having nightmares overnight.  

Sleeping this morning.  No complaints to offer this morning.  Currently 

awaiting records from [facility] regarding her past radiation and CT 

report along with pathology.





Review of Systems


10-point ROS is otherwise unremarkable





Physical Examination





- Vital Signs


Temperature: 98 F


Blood Pressure: 122/67


Pulse: 70


Respirations: 15


Pulse Ox (%): 95





- Physical Exam


General: Alert, In no apparent distress, Oriented x3


HEENT: Atraumatic


Neck: Supple, JVD distended


Respiratory: Normal air movement, Expiratory wheezes, Inspiratory wheezes, 

Rhonchi/gurgles


Cardiovascular: Regular rate/rhythm, Normal S1 S2


Gastrointestinal: Normal bowel sounds, No tenderness, Ascites


Musculoskeletal: No tenderness


Integumentary: No rashes


Neurological: Normal speech, Normal tone, Normal affect


Lymphatics: No axilla or inguinal lymphadenopathy





- Studies


Microbiology Data (last 24 hrs): 








01/02/19 12:00   Blood  - Blood   Aerobic Blood Culture - Final


                            No growth in 5 days.


01/02/19 12:00   Blood  - Blood   Anaerobic Blood Culture - Final


                            No growth in 5 days.


01/02/19 06:55   Blood  - Blood   Aerobic Blood Culture - Final


                            No growth in 5 days.


01/02/19 06:55   Blood  - Blood   Anaerobic Blood Culture - Final


                            No growth in 5 days.





Medications List Reviewed: Yes





Assessment And Plan





- Current Problems (Diagnosis)


(1) Lung cancer


Current Visit: Yes   Status: Acute   


Plan: 


History of lung cancer in the past.  Status post radiation.  Patient has been 

seen in [facility] by Dr. KNOX


-CT scan at this time abnormal with possible mass versus extensive 

bronchiectasis


-pulmonology consulted.  Recommendations appreciated at this time.


-records have been requested from [facility] at this time.


-bronchoscopy possible at this time after records have been obtained.


Qualifiers: 


   Laterality: right   Lung location: unspecified part of lung   Qualified Code(

s): C34.91 - Malignant neoplasm of unspecified part of right bronchus or lung   





(2) Cardiopulmonary arrest with successful resuscitation


Current Visit: Yes   Status: Resolved   


Plan: 


Status post cardiopulmonary arrest with successful resuscitation


-currently extubated doing well overall.  Normal sinus rhythm as well.


-echocardiogram done consistent with congestive heart failure with depressed EF 

49-45% and and diastolic dysfunction.


-small amount of pericardial effusion noted as well.


-cardiology and pulmonology has been consulted.  Appreciated recommendations at 

this time


-most likely patient's cardiopulmonary arrest secondary to syncopal episode and 

bradycardia


-pacemaker has been interrogated with no events recorded.


-stable hemodynamic status at this time.








(3) Abdominal pain


Current Visit: Yes   Status: Chronic   


Plan: 


Abdominal pain most likely secondary to anasarca.  Improved today


-continue with IV Lasix 


-abdominal CT negative for any acute abnormality


-encourage patient to ambulate at this time


Qualifiers: 


   Abdominal location: generalized   Qualified Code(s): R10.84 - Generalized 

abdominal pain   





(4) Hypervolemia


Onset Date: 10/01/18   Current Visit: No   Status: Acute   


Plan: 


Hyperkalemia most likely secondary to CHF versus CKD.  Patient with pericardial 

effusion along with pleural effusion noted on the chest CT.


-continue with IV Lasix at this time


-nephrology has been consulted.  Recommendations appreciated at this time


-cardiology has been consulted.  Recommendation appreciated at this time


-pulmonology has been consulted recommendations appreciated at this time








Qualifiers: 


   Hypervolemia type: other   Qualified Code(s): E87.79 - Other fluid overload 

  





(5) CKD (chronic kidney disease)


Onset Date: 09/08/15   Current Visit: No   Status: Chronic   


Plan: 


Acute on chronic kidney disease most likely secondary to hypervolemia.  


-BUN and creatinine continues to be elevated here in the hospital.  Most likely 

secondary to Lasix


-IV Lasix 40 daily g b.i.d. at this time. 


-renal ultrasound negative for any acute disease however positive for chronic 

parenchymal disease


-nephrology consulted.  Recommendations appreciated at this time


-will follow up with nephrology regarding further Lloyd on changing Lasix


Qualifiers: 


   Chronic kidney disease stage: stage 3 (moderate)   Qualified Code(s): N18.3 

- Chronic kidney disease, stage 3 (moderate)   





(6) CHF (congestive heart failure)


Onset Date: 05/15/15   Current Visit: No   Status: Chronic   


Plan: 


History of congestive heart failure, systolic and diastolic


-echo with ejection fraction of 45-49% with global hypokinesis.


-NHYclass 4 heart failure


-started on spironolactone at this time.


-currently on beta-blocker and Lasix.  Lisinopril and losartan have been on 

hold due to worsening kidney function.





Qualifiers: 


   Heart failure type: systolic   Heart failure chronicity: acute on chronic   

Qualified Code(s): I50.23 - Acute on chronic systolic (congestive) heart 

failure   





(7) Atrial fibrillation


Onset Date: 10/12/17   Current Visit: No   Status: Chronic   


Plan: 


Currently on Eliquis and Coreg for atrial fibrillation


Qualifiers: 


   Atrial fibrillation type: paroxysmal   Qualified Code(s): I48.0 - Paroxysmal 

atrial fibrillation   





(8) Hyperkalemia


Current Visit: Yes   Status: Acute   


Plan: 


Hyperkalemia most likely secondary to volume overload


-potassium in the normal ranges today.


-on cardiac tele


-continue monitor closely.


-IV Lasix at this time as well








(9) Depression with anxiety


Onset Date: 09/17/18   Current Visit: No   Status: Chronic   





(10) GERD (gastroesophageal reflux disease)


Onset Date: 10/12/17   Current Visit: No   Status: Chronic   


Qualifiers: 


   Esophagitis presence: without esophagitis   Qualified Code(s): K21.9 - Gastro

-esophageal reflux disease without esophagitis   





(11) Hypertension


Onset Date: 10/12/17   Current Visit: No   Status: Chronic   


Qualifiers: 


   Hypertension type: essential hypertension 





(12) Type 2 diabetes mellitus


Onset Date: 10/12/17   Current Visit: No   Status: Chronic   


Qualifiers: 


   Diabetes mellitus long term insulin use: with long term use   Diabetes 

mellitus complication status: without complication   Qualified Code(s): E11.9 - 

Type 2 diabetes mellitus without complications; Z79.4 - Long term (current) use 

of insulin   





- Plan





Pending clinical improvement at this time.  Will follow up with medical records 

from [facility] regarding the pathology and the CT scan report.  Will 

follow up with pulmonology regarding the decision for further care for the 

patient's lung mass.  Most likely the changes on the CT scanner consistent with 

radiation into scarring and extensive bronchiectasis.  However will await 

medical records from [place].  Patient can be transferred out of the ICU today.


Discharge Plan: Home


Plan to discharge in: Greater than 2 days





- Code Status/Comfort Care


Code Status Assessed: Yes


Critical Care: Yes

## 2019-01-08 NOTE — PN
Date of Progress Note:  01/07/2019



Chief Complaint:  Fluid overload, chronic kidney disease, hyperkalemia.



History Of Present Illness:  The patient has multiple medical problems 
including history of diabetes mellitus, hypertension, atrial fibrillation, 
congestive heart failure.  She presented to emergency room complaining of 
abdominal pain and shortness of breath.  She required intubation during this 
admission, was extubated yesterday.  She is on IV diuretics for fluid overload.
  She was found to have pleural effusion.



Review of Systems:

The patient is feeling better today.



Physical Examination:

Lungs:  Diminished breath sounds at bases. 

Heart:  S1, S2. 

Abdomen:  Soft, benign. 

Extremities:  Edema present in both legs.



Laboratory Data:  Hemoglobin 10.1, WBC 6.4, platelet count is 143,000.  Sodium 
140, potassium 3.9, chloride 102, CO2 of 30, BUN 48, creatinine 2.43, glucose 
206, calcium 7.8, phosphorus 4.5, albumin 2.5.



Impression And Plan:  

1.   Acute kidney injury on chronic kidney disease, prerenal azotemia, 
nonoliguric acute tubular necrosis.  Serum creatinine level has risen from 1.8 
to 2.4.  Monitor electrolytes closely and urine output.

2.   Status post cardiac arrest.  The patient was intubated.  Cardiology 
consultation was obtained.

3.   Hyperkalemia, resolved.  Monitor electrolytes.  Monitor for any evidence 
of metabolic acidosis.

4.   The patient was found to have left kidney cyst, which appears to be 
stable.  The patient will follow up with outpatient nephrologist.

5.   Fluid overload, anasarca.  Continue Lasix.  Monitor electrolytes including 
magnesium level.

I spent total 36 min including 26 min to coordinate care plan.



ALBERT/NATIVIDAD

DD:  01/07/2019 21:23:17   Voice ID:  [id number]

DT:  01/08/2019 01:16:58   Report ID:  [id number]

MTDD

## 2019-01-09 LAB
ALBUMIN SERPL BCP-MCNC: 2.7 G/DL (ref 3.4–5)
ALP SERPL-CCNC: 127 U/L (ref 45–117)
ALT SERPL W P-5'-P-CCNC: 27 U/L (ref 12–78)
ANISOCYTOSIS BLD QL: (no result)
AST SERPL W P-5'-P-CCNC: 34 U/L (ref 15–37)
BLD SMEAR INTERP: (no result)
BUN BLD-MCNC: 61 MG/DL (ref 7–18)
COHGB MFR BLDA: 3.2 % (ref 0–1.5)
GLUCOSE SERPLBLD-MCNC: 191 MG/DL (ref 74–106)
HCT VFR BLD CALC: 34.2 % (ref 36–45)
LYMPHOCYTES # SPEC AUTO: 0.5 K/UL (ref 0.7–4.9)
MAGNESIUM SERPL-MCNC: 2.2 MG/DL (ref 1.8–2.4)
MORPHOLOGY BLD-IMP: (no result)
OXYHGB MFR BLDA: 93.8 % (ref 94–97)
PMV BLD: 8.4 FL (ref 7.6–11.3)
POTASSIUM SERPL-SCNC: 4.3 MMOL/L (ref 3.5–5.1)
RBC # BLD: 4.21 M/UL (ref 3.86–4.86)
SAO2 % BLDA: 97.3 % (ref 92–98.5)

## 2019-01-09 PROCEDURE — B548ZZA ULTRASONOGRAPHY OF SUPERIOR VENA CAVA, GUIDANCE: ICD-10-PCS

## 2019-01-09 PROCEDURE — 05H533Z INSERTION OF INFUSION DEVICE INTO RIGHT SUBCLAVIAN VEIN, PERCUTANEOUS APPROACH: ICD-10-PCS

## 2019-01-09 RX ADMIN — ATORVASTATIN CALCIUM SCH: 40 TABLET, FILM COATED ORAL at 21:00

## 2019-01-09 RX ADMIN — APIXABAN SCH: 2.5 TABLET, FILM COATED ORAL at 21:00

## 2019-01-09 RX ADMIN — ALBUMIN (HUMAN) SCH: 5 SOLUTION INTRAVENOUS at 00:36

## 2019-01-09 RX ADMIN — SODIUM CHLORIDE PRN MLS: 0.9 INJECTION, SOLUTION INTRAVENOUS at 23:30

## 2019-01-09 RX ADMIN — TAZOBACTAM SODIUM AND PIPERACILLIN SODIUM SCH MLS: 250; 2 INJECTION, SOLUTION INTRAVENOUS at 09:32

## 2019-01-09 RX ADMIN — HUMAN INSULIN SCH UNIT: 100 INJECTION, SOLUTION SUBCUTANEOUS at 16:54

## 2019-01-09 RX ADMIN — HUMAN INSULIN SCH: 100 INJECTION, SOLUTION SUBCUTANEOUS at 07:30

## 2019-01-09 RX ADMIN — Medication SCH ML: at 21:00

## 2019-01-09 RX ADMIN — HUMAN INSULIN SCH: 100 INJECTION, SOLUTION SUBCUTANEOUS at 21:00

## 2019-01-09 RX ADMIN — PANTOPRAZOLE SODIUM SCH: 40 TABLET, DELAYED RELEASE ORAL at 07:30

## 2019-01-09 RX ADMIN — DEXTROSE MONOHYDRATE SCH MLS: 50 INJECTION, SOLUTION INTRAVENOUS at 20:41

## 2019-01-09 RX ADMIN — Medication SCH ML: at 09:33

## 2019-01-09 RX ADMIN — SODIUM CHLORIDE SCH MG: 0.9 INJECTION, SOLUTION INTRAVENOUS at 09:34

## 2019-01-09 RX ADMIN — APIXABAN SCH: 2.5 TABLET, FILM COATED ORAL at 08:42

## 2019-01-09 RX ADMIN — SERTRALINE HYDROCHLORIDE SCH: 50 TABLET ORAL at 08:41

## 2019-01-09 RX ADMIN — SPIRONOLACTONE SCH: 25 TABLET, FILM COATED ORAL at 08:42

## 2019-01-09 RX ADMIN — SODIUM CHLORIDE SCH MLS: 9 INJECTION, SOLUTION INTRAVENOUS at 09:32

## 2019-01-09 RX ADMIN — TAZOBACTAM SODIUM AND PIPERACILLIN SODIUM SCH MLS: 250; 2 INJECTION, SOLUTION INTRAVENOUS at 16:54

## 2019-01-09 RX ADMIN — TRAMADOL HYDROCHLORIDE PRN MG: 50 TABLET, COATED ORAL at 03:01

## 2019-01-09 RX ADMIN — HUMAN INSULIN SCH: 100 INJECTION, SOLUTION SUBCUTANEOUS at 11:30

## 2019-01-09 RX ADMIN — DEXTROSE MONOHYDRATE SCH: 50 INJECTION, SOLUTION INTRAVENOUS at 14:00

## 2019-01-09 RX ADMIN — SODIUM CHLORIDE PRN MLS: 0.9 INJECTION, SOLUTION INTRAVENOUS at 08:47

## 2019-01-09 NOTE — RAD REPORT
EXAM DESCRIPTION:  RAD - Chest Single View - 1/9/2019 9:31 pm

 

CLINICAL HISTORY:  PICC Placement

 

COMPARISON:  Chest Single View dated 1/9/2019; Chest Single View dated 1/8/2019; Chest Single View da
[place] 1/5/2019; Chest Single View dated 1/4/2019

 

FINDINGS:  Portable chest was obtained following placement of a right upper extremity PICC line. The 
catheter tip projects over the proximal SVC..

## 2019-01-09 NOTE — P.PN
Subjective


Date of Service: 01/09/19


Primary Care Provider: unknown


Chief Complaint: Respiratory distress





Code yellow was called this morning.  Patient found to be hypoxic with low 

blood pressure.  Patient was able to follow commands.  Patient appeared ill.  

Patient was sent to the ICU for treatment.





Physical Examination





- Vital Signs


Temperature: 99.1 F


Blood Pressure: 113/57


Pulse: 70


Respirations: 13


Pulse Ox (%): 98





- Physical Exam


General: Moderate distress, Other (Patient was able to cooperate with 

responses.  Skin appears ashy in color.  Patient appears hypoxic.  Patient was 

hypoxic initially but now improved with oxygen.)


HEENT: Atraumatic


Neck: Supple


Respiratory: Crackles/rales (Bilateral), Other (Poor inspiration and expiration.

)


Cardiovascular: Normal pulses, Regular rate/rhythm


Gastrointestinal: Normal bowel sounds, Soft and benign, Non-distended


Musculoskeletal: No tenderness, No warmth


Neurological: Other (Patient with increased sedation.)





- Studies


Medications List Reviewed: Yes





Assessment & Plan


Discharge Plan: Home


Plan to discharge in: Greater than 2 days


Physician Review Additional Text: 





Impression:


Encephalopathy likely related to underlying infection verses cardiac in nature, 

noted elevated troponin with hypotension requiring vasopressor


Acute on chronic systolic and diastolic CHF


Recent cardiopulmonary arrest with successful resuscitation


Lung cancer with history of radiation


Atrial fibrillation on chronic anti coagulation therapy


Depression with anxiety


GERD


Hypertension


Diabetes mellitus type 2


Wound culture positive for MRSA:





Plan:


Encephalopathy likely related to underlying infection verses pulmonary vs 

cardiac in nature with noted elevated troponin with hypotension requiring 

vasopressor, hypoxia and poor inspiration/expiration:  Patient currently in 

ICU.  Patient now on Levophed.  Will continue with Levophed to maintain 

adequate perfusion.  Will continue with oxygen to maintain sats above 90%.  

Patient also on IV Lasix drip.  Case discussed at length with nephrology.  

Patient to be monitored closely.  Case also discussed with pulmonology.  Will 

need to rule out infectious cause.  Patient now on IV vancomycin and Zosyn.  

Blood cultures obtained.  Will monitor CBC and BMP.  Recheck x-ray.  Cardiac 

enzymes elevated.  Case discussed with family and medical power of .  

Advanced directives addressed.  Family and medical power of  understand 

her condition.  They wish to place the patient as DNR.  Will continue with 

current treatment.  Advanced directives changed.


Acute on chronic systolic and diastolic CHF:  Patient now on IV Lasix drip.


Recent cardiopulmonary arrest with successful resuscitation:  Stable this time.

  Continue as above.


Lung cancer with history of radiation:  Will discuss with pulmonology.


Atrial fibrillation on chronic anti coagulation therapy:  Patient on Eliquis.  

Will continue with Eliquis if able to take oral intake.  If not patient will 

require Lovenox.


Depression with anxiety:  Will monitor and address appropriately.


GERD:  Will continue with medication.


Hypertension:  Will monitor closely.  Patient now on Levophed.


Diabetes mellitus type 2:  Will monitor closely.


Wound culture positive for MRSA:  Patient now on vancomycin.


Time Spent Managing Pts Care (In Minutes): 70

## 2019-01-09 NOTE — P.PN
Subjective


Date of Service: 01/09/19


Chief Complaint: Respiratory distress





Patient was transferred from the floor she developed respiratory distress 

became little diaphoretic currently alert responsive cooperative on BiPAP





Review of Systems


is unable to be obtained





Physical Examination





- Vital Signs


Temperature: 99.1 F


Blood Pressure: 113/57


Pulse: 70


Respirations: 13


Pulse Ox (%): 98





- Physical Exam


General: Alert, Moderate distress


HEENT: Atraumatic


Neck: Supple


Respiratory: Clear to auscultation bilaterally, Diminished





- Studies


Medications List Reviewed: Yes





Assessment & Plan





- Problems (Diagnosis)


(1) Respiratory failure


Current Visit: Yes   Status: Resolved   


Plan: 


Patient is 83 years of age admitted with respiratory distress he was already 

anti coagulated white count elevated possible sepsis renal function is worse 

blood gases reviewed patient is very hypoxic with a PO2 89 on 100% oxygen blood 

pressure stable family members made the patient DNR prognosis very poor the 

Lasix IV drip started including a broad-spectrum antibiotics Mr LESLIE isolated 

from a wound in the leg echocardiogram shows diminished ejection fraction 

severe pulmonary hypertension patient also has is hilar mass as per radiology 

possible radiation induced change in any case patient is not a candidate for 

bronchoscopy chemo radiation if is recurrent cancer family members 0


Qualifiers: 


   Chronicity: acute 





(2) Lung cancer


Current Visit: Yes   Status: Acute   


Plan: 


Patient has a very large right-sided hilar mass suspicious of lung cancer 

according to the patient she was diagnosed and treated in [place] and [place] 

with radiation will try and obtain records and discussed with daughter 

regarding further biopsy


Qualifiers: 


   Laterality: right   Lung location: unspecified part of lung   Qualified Code(

s): C34.91 - Malignant neoplasm of unspecified part of right bronchus or lung

## 2019-01-09 NOTE — PN
Date of Progress Note:  01/09/2019



NEPHROLOGY FOLLOWUP



Subjective:  The patient over the night transferred to the floor.  There has been altered mental stat
us, Cabrera has been inserted, had urine output of around 450.  The patient in the morning blood pressu
re dropped down, transferred back to the ICU.  The patient has been altered and sleepy.



Physical Examination:

General:  When I saw the patient, the patient is lying in bed, on BiPAP. 

Vital Signs:  Blood pressure 113/57, pulse of 70, afebrile.  The patient as I mentioned, had urine ou
tput of 450. 

Chest:  Crackles, bilateral. 

Heart:  S1 and S2, regular. 

Abdomen:  Soft, nontender. 

Extremities:  Trace edema.



Laboratory Data:  WBC 14.6, H and H of 10.7 and 34.2, platelets 164.  Sodium 135, potassium 4.3, bica
rb 26, BUN 61, creatinine 2.9, GFR down to 15, calcium 8.1, phosphorus 4.9.



Current Medications:  The patient on include the patient was started on Levophed, Zosyn 2.25 q.8, van
comycin, and the patient on Lasix drip, Eliquis, Tylenol, Zoloft, Zofran, pantoprazole.



Assessment And Plan:  

1.Acute kidney injury secondary to cardiorenal over-volume.  I am going to go ahead and continue on 
the Lasix drip, in fact, I am going to increase the Lasix to 20 mg per hour and will follow up the pantera jauregui.

2.Shock, questionable septic shock/cardiogenic.  We will go ahead and send procalcitonin.  I agree w
ith the Levophed.  Continue current antibiotic.  We will follow up the culture.

3.Wound infection, methicillin-resistant Staphylococcus aureus.  Continue vancomycin.  We will follo
w up vancomycin trough.

4.Altered mental status, encephalopathy.  Hypercapnic encephalopathy has been ruled out.  Continue s
upportive care.  Continue antibiotic.  We will maintain the blood pressure.  We will follow up with t
donte primary.  We will send for ammonia level.





MA/NATIVIDAD

DD:  01/09/2019 12:51:38Voice ID:  [id number]

DT:  01/09/2019 17:14:35Report ID:  [id number]

## 2019-01-09 NOTE — RAD REPORT
EXAM DESCRIPTION:  CT - Head Brain Wo Cont - 1/9/2019 8:11 pm

 

CLINICAL HISTORY:  r/o stroke

CVA symptomology.

 

COMPARISON:  Head Brain Wo Cont dated 1/6/2019; Head Brain Wo Cont dated 8/25/2017; Thorax Wo Con vikas
ed 1/6/2019

 

TECHNIQUE:  All CT scans are performed using dose optimization technique as appropriate and may inclu
de automated exposure control or mA/KV adjustment according to patient size.

 

FINDINGS:  No intracranial hemorrhage, hydrocephalus or extra-axial fluid collection.Mild generalized
 brain atrophy.No areas of brain edema or evidence of midline shift. The vertebral arteries are heavi
ly calcified.

 

The paranasal sinuses and mastoids are clear. The calvarium is intact.

 

IMPRESSION:  No acute intracranial abnormality.

## 2019-01-09 NOTE — PN
Ms. Milian had another sudden deterioration.  There is a lot of neurological damage.  Her mental statu
s is obtunded.  She follows our command like stick out her tongue.  No spontaneous motion of the arms
 or legs.  So it could well be a brainstem stroke, she has had.  Of course, she is at risk for a stro
ke with her arrhythmia and age.  Family has decided to make her DNR.  I think that is a chang choice. 
 There was no pacemaker malfunction during any of this, so I think we do not want to entertain the th
ought again that her pacemaker is the cause of any of this.





ADIEL/NATIVIDAD

DD:  01/09/2019 10:03:55Voice ID:  [id number]

DT:  01/09/2019 14:37:24Report ID:  [id number]

## 2019-01-09 NOTE — RAD REPORT
EXAM DESCRIPTION:  RAD - Chest Single View - 1/9/2019 9:07 am

 

CLINICAL HISTORY:  Dyspnea

 

COMPARISON:  January 8

 

TECHNIQUE:  AP portable chest image was obtained 0903 hours .

 

FINDINGS:  Lung volumes remain low. Right perihilar opacification has improved only fractionally. No 
progressive lung parenchymal process seen. Cardiac silhouette remains enlarged. Vasculature has dimin
ished in prominence. Pleural effusions evident. No pneumothorax. No acute bony abnormality seen. No a
cute aortic findings suspected.

 

IMPRESSION:  Limited portable chest examination not substantially different from prior imaging. Right
 perihilar opacification is probably fractionally improved.

## 2019-01-10 LAB
BUN BLD-MCNC: 72 MG/DL (ref 7–18)
GLUCOSE SERPLBLD-MCNC: 232 MG/DL (ref 74–106)
HCT VFR BLD CALC: 32.3 % (ref 36–45)
LYMPHOCYTES # SPEC AUTO: 0.5 K/UL (ref 0.7–4.9)
MAGNESIUM SERPL-MCNC: 2.3 MG/DL (ref 1.8–2.4)
PMV BLD: 8.7 FL (ref 7.6–11.3)
POTASSIUM SERPL-SCNC: 4.1 MMOL/L (ref 3.5–5.1)
RBC # BLD: 3.97 M/UL (ref 3.86–4.86)

## 2019-01-10 RX ADMIN — HUMAN INSULIN SCH UNIT: 100 INJECTION, SOLUTION SUBCUTANEOUS at 08:15

## 2019-01-10 RX ADMIN — Medication SCH ML: at 21:00

## 2019-01-10 RX ADMIN — ATORVASTATIN CALCIUM SCH MG: 40 TABLET, FILM COATED ORAL at 21:55

## 2019-01-10 RX ADMIN — DEXTROSE MONOHYDRATE SCH MLS: 50 INJECTION, SOLUTION INTRAVENOUS at 05:23

## 2019-01-10 RX ADMIN — HUMAN INSULIN SCH UNIT: 100 INJECTION, SOLUTION SUBCUTANEOUS at 21:00

## 2019-01-10 RX ADMIN — DEXTROSE MONOHYDRATE SCH MLS: 50 INJECTION, SOLUTION INTRAVENOUS at 18:45

## 2019-01-10 RX ADMIN — HUMAN INSULIN SCH UNIT: 100 INJECTION, SOLUTION SUBCUTANEOUS at 12:38

## 2019-01-10 RX ADMIN — HUMAN INSULIN SCH UNIT: 100 INJECTION, SOLUTION SUBCUTANEOUS at 16:59

## 2019-01-10 RX ADMIN — TAZOBACTAM SODIUM AND PIPERACILLIN SODIUM SCH MLS: 250; 2 INJECTION, SOLUTION INTRAVENOUS at 08:14

## 2019-01-10 RX ADMIN — APIXABAN SCH MG: 2.5 TABLET, FILM COATED ORAL at 08:53

## 2019-01-10 RX ADMIN — DEXTROSE MONOHYDRATE SCH MLS: 50 INJECTION, SOLUTION INTRAVENOUS at 00:58

## 2019-01-10 RX ADMIN — Medication SCH PATCH: at 10:05

## 2019-01-10 RX ADMIN — DEXTROSE MONOHYDRATE SCH MLS: 50 INJECTION, SOLUTION INTRAVENOUS at 12:39

## 2019-01-10 RX ADMIN — SODIUM CHLORIDE PRN MLS: 0.9 INJECTION, SOLUTION INTRAVENOUS at 20:43

## 2019-01-10 RX ADMIN — TAZOBACTAM SODIUM AND PIPERACILLIN SODIUM SCH MLS: 250; 2 INJECTION, SOLUTION INTRAVENOUS at 16:58

## 2019-01-10 RX ADMIN — Medication SCH ML: at 08:15

## 2019-01-10 RX ADMIN — TAZOBACTAM SODIUM AND PIPERACILLIN SODIUM SCH MLS: 250; 2 INJECTION, SOLUTION INTRAVENOUS at 01:00

## 2019-01-10 RX ADMIN — SODIUM CHLORIDE SCH MG: 0.9 INJECTION, SOLUTION INTRAVENOUS at 08:15

## 2019-01-10 RX ADMIN — APIXABAN SCH MG: 2.5 TABLET, FILM COATED ORAL at 21:55

## 2019-01-10 NOTE — P.PN
Subjective


Date of Service: 01/10/19


Primary Care Provider: unknown


Chief Complaint: Respiratory distress


Subjective: Other (Patient remains on Levophed.  Patient with lethargy last 

night.  Slight improvement since yesterday clinically.)





Physical Examination





- Vital Signs


Temperature: 97.3 F


Blood Pressure: 102/47


Pulse: 70


Respirations: 16


Pulse Ox (%): 97





- Physical Exam


General: Alert, Cooperative, Other (Increased tiredness but appropriate and 

cooperative.)


HEENT: Atraumatic


Neck: Supple


Respiratory: Crackles/rales (Bilateral)


Cardiovascular: Normal pulses, Regular rate/rhythm


Gastrointestinal: Normal bowel sounds, Soft and benign, Non-distended, No 

tenderness, No masses, No rebound, No guarding


Musculoskeletal: No warmth


Integumentary: Tenderness/swelling (Edema to the upper and lower extremities.)


Neurological: Normal speech, Normal strength at 5/5 x4 extr, Normal tone, 

Normal affect





- Studies


Medications List Reviewed: Yes





Assessment & Plan


Discharge Plan: Other (Home versus skilled placement)


Plan to discharge in: Greater than 2 days


Physician Review Additional Text: 





Impression:


Encephalopathy likely related to underlying infection verses pulmonary vs 

cardiac in nature with noted elevated troponin with hypotension requiring 

vasopressor, hypoxia, bacteremia and poor inspiration/expiration:


Acute on chronic systolic and diastolic CHF


Acute on chronic renal failure stage 5


Recent cardiopulmonary arrest with successful resuscitation


Lung cancer with history of radiation


Atrial fibrillation on chronic anti coagulation therapy


Depression with anxiety


GERD


Hypertension now with hypotension on vasopressor


Diabetes mellitus type 2


Wound culture positive for MRSA:





Plan:


Encephalopathy likely related to underlying infection verses pulmonary vs 

cardiac in nature with noted elevated troponin with hypotension requiring 

vasopressor, hypoxia, bacteremia and poor inspiration/expiration:  Patient 

stable at this time.  Patient cooperative but still sluggish.  Patient 

continues on Levophed.  Poor output noted. Patient remains on IV Lasix drip.  

Patient on IV vancomycin and Zosyn.  Blood cultures positive.  Await findings.  

Recent wound culture positive for methicillin-resistant Staph aureus.  Renal 

function has declined.  Troponin elevated likely from stress related to 

multifactorial issues.  No need for cardiac intervention at this time.  Case 

discussed at length with family yesterday.  Will continue to monitor closely.  

Will check to see if there is any improvement over the next 12:48 p.m..  Family 

is hesitant consider dialysis at this time.  Will continue to monitor closely.  

Will have patient evaluated for swallowing.  If stable will advance her diet.  

Patient remains DNR.  Family understands long-term care may require skilled 

placement if improved or hospice if no significant improvement or change.  Will 

discuss with nephrology, cardiology and pulmonology.


Acute on chronic systolic and diastolic CHF:  Patient currently on a Lasix 

drip.  Poor output noted. Will discuss with nephrology.


Acute on chronic renal failure stage 5:  Renal function continues to decline.  

Poor urine output noted. Will discuss further with nephrology.  Patient and 

family likely not desiring dialysis at this time.  Will continue to reassess.


Recent cardiopulmonary arrest with successful resuscitation:  Stable this time.

  Continue as above.


Lung cancer with history of radiation:  Will discuss with pulmonology.  No 

future intervention due to current and long-term status.


Atrial fibrillation on chronic anti coagulation therapy:  Patient on Eliquis.  

Will continue with Eliquis if able to take oral intake.  If not patient will 

require Lovenox.


Depression with anxiety:  Will monitor and address appropriately.


GERD:  Will continue with medication.


Hypertension now with hypotension on vasopressor:  Continue to hold blood 

pressure medication.  Will monitor closely.  Patient now on Levophed.


Diabetes mellitus type 2:  Will monitor closely.


Wound culture positive for MRSA:  Patient now on vancomycin.


Time Spent Managing Pts Care (In Minutes): 55

## 2019-01-11 LAB
BUN BLD-MCNC: 81 MG/DL (ref 7–18)
GLUCOSE SERPLBLD-MCNC: 221 MG/DL (ref 74–106)
HCT VFR BLD CALC: 30.2 % (ref 36–45)
LYMPHOCYTES # SPEC AUTO: 0.3 K/UL (ref 0.7–4.9)
MAGNESIUM SERPL-MCNC: 2.4 MG/DL (ref 1.8–2.4)
PMV BLD: 8.4 FL (ref 7.6–11.3)
POTASSIUM SERPL-SCNC: 4.3 MMOL/L (ref 3.5–5.1)
RBC # BLD: 3.77 M/UL (ref 3.86–4.86)

## 2019-01-11 RX ADMIN — DEXTROSE MONOHYDRATE SCH MLS: 50 INJECTION, SOLUTION INTRAVENOUS at 00:21

## 2019-01-11 RX ADMIN — TAZOBACTAM SODIUM AND PIPERACILLIN SODIUM SCH MLS: 250; 2 INJECTION, SOLUTION INTRAVENOUS at 16:24

## 2019-01-11 RX ADMIN — HUMAN INSULIN SCH UNIT: 100 INJECTION, SOLUTION SUBCUTANEOUS at 12:21

## 2019-01-11 RX ADMIN — TAZOBACTAM SODIUM AND PIPERACILLIN SODIUM SCH MLS: 250; 2 INJECTION, SOLUTION INTRAVENOUS at 09:12

## 2019-01-11 RX ADMIN — SODIUM CHLORIDE SCH MLS: 9 INJECTION, SOLUTION INTRAVENOUS at 09:11

## 2019-01-11 RX ADMIN — DEXTROSE MONOHYDRATE SCH MLS: 50 INJECTION, SOLUTION INTRAVENOUS at 21:07

## 2019-01-11 RX ADMIN — ATORVASTATIN CALCIUM SCH: 40 TABLET, FILM COATED ORAL at 21:00

## 2019-01-11 RX ADMIN — DEXTROSE MONOHYDRATE SCH MLS: 50 INJECTION, SOLUTION INTRAVENOUS at 05:14

## 2019-01-11 RX ADMIN — APIXABAN SCH MG: 2.5 TABLET, FILM COATED ORAL at 09:12

## 2019-01-11 RX ADMIN — SODIUM CHLORIDE PRN MLS: 0.9 INJECTION, SOLUTION INTRAVENOUS at 17:50

## 2019-01-11 RX ADMIN — DEXTROSE MONOHYDRATE SCH MLS: 50 INJECTION, SOLUTION INTRAVENOUS at 10:32

## 2019-01-11 RX ADMIN — TAZOBACTAM SODIUM AND PIPERACILLIN SODIUM SCH MLS: 250; 2 INJECTION, SOLUTION INTRAVENOUS at 01:15

## 2019-01-11 RX ADMIN — HUMAN INSULIN SCH UNIT: 100 INJECTION, SOLUTION SUBCUTANEOUS at 16:24

## 2019-01-11 RX ADMIN — DEXTROSE MONOHYDRATE SCH MLS: 50 INJECTION, SOLUTION INTRAVENOUS at 15:31

## 2019-01-11 RX ADMIN — HUMAN INSULIN SCH UNIT: 100 INJECTION, SOLUTION SUBCUTANEOUS at 09:11

## 2019-01-11 RX ADMIN — Medication SCH ML: at 09:12

## 2019-01-11 RX ADMIN — APIXABAN SCH: 2.5 TABLET, FILM COATED ORAL at 21:00

## 2019-01-11 RX ADMIN — DEXTROSE MONOHYDRATE SCH: 50 INJECTION, SOLUTION INTRAVENOUS at 06:00

## 2019-01-11 RX ADMIN — SODIUM CHLORIDE SCH MG: 0.9 INJECTION, SOLUTION INTRAVENOUS at 09:11

## 2019-01-11 NOTE — P.PN
Subjective


Date of Service: 01/11/19


Primary Care Provider: unknown


Chief Complaint: Respiratory distress


Subjective: Worsening





oliguric 


lethargic


on levophed 


I discussed with family at bedside about pt prognosis and worsening RFT 


pt with multiple comorbidities and poor prognosis the risk of initiating HD  

outeigh the benefit 


family agreed 


cont current mg 








Physical Examination





- Vital Signs


Temperature: 97.5 F


Blood Pressure: 98/46


Pulse: 71


Respirations: 15


Pulse Ox (%): 99





- Physical Exam


General: Comatose


HEENT: Atraumatic


Neck: Supple, Without JVD or thyroid abnormality


Respiratory: Clear to auscultation bilaterally


Cardiovascular: Regular rate/rhythm, Normal S1 S2, Edema


Gastrointestinal: Soft and benign, Distended





- Studies


Medications List Reviewed: Yes





Assessment And Plan





- Current Problems (Diagnosis)


(1) Hyperkalemia


Current Visit: Yes   Status: Acute   





(2) Hypervolemia


Onset Date: 10/01/18   Current Visit: No   Status: Acute   


Qualifiers: 


   Hypervolemia type: other   Qualified Code(s): E87.79 - Other fluid overload 

  





(3) Pleural effusion


Onset Date: 10/01/18   Current Visit: No   Status: Acute   





(4) CKD (chronic kidney disease)


Onset Date: 09/08/15   Current Visit: No   Status: Chronic   


Qualifiers: 


   Chronic kidney disease stage: stage 3 (moderate)   Qualified Code(s): N18.3 

- Chronic kidney disease, stage 3 (moderate)   





- Plan

















IGLESIA 





oliguric 


lethargic


on levophed 


I discussed with family at bedside about pt prognosis and worsening RFT 


pt with multiple comorbidities and poor prognosis the risk of initiating HD  

outeigh the benefit 


family agreed 


cont current [facility] [place] 








abdominal distension 


Abd CT : no acute pathology 


need GI f/u as an OP 








lt renal cyst 


stable 








Lt adrenal myelolipoma 














elevated ALP , 





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  





DM 


as per primaery

## 2019-01-11 NOTE — P.PN
Subjective


Date of Service: 01/11/19


Primary Care Provider: unknown


Chief Complaint: Respiratory distress


Subjective: Other (Patient still lethargic but cooperative with commands.  Poor 

urinary output.)





Physical Examination





- Vital Signs


Temperature: 97.5 F


Blood Pressure: 100/47


Pulse: 71


Respirations: 15


Pulse Ox (%): 99





- Physical Exam


General: Alert, Cooperative, Other (Increased lethargy)


HEENT: Atraumatic


Neck: Supple


Respiratory: Crackles/rales (Crackles to the bases)


Cardiovascular: Normal pulses, Regular rate/rhythm


Gastrointestinal: Normal bowel sounds, Soft and benign, Non-distended, No masses

, No rebound, No guarding


Musculoskeletal: No erythema, No tenderness, No warmth


Integumentary: No erythema, No warmth, No cyanosis, Tenderness/swelling (

Anasarca noted)


Neurological: Normal speech, Normal tone, Other (Patient still very weak.)





- Studies


Medications List Reviewed: Yes





Assessment & Plan


Discharge Plan: Other (Skilled placement versus home with hospice versus 

possible transfer)


Plan to discharge in: Greater than 2 days


Physician Review Additional Text: 





Impression:


Encephalopathy likely related to bacteremia, poor perfusion, acute on chronic 

renal failure likely ATN and acute on chronic systolic/diastolic CHF


Acute on chronic systolic and diastolic CHF


Acute on chronic renal failure stage 5


Recent cardiopulmonary arrest with successful resuscitation


Lung cancer with history of radiation


Atrial fibrillation on chronic anti coagulation therapy


Depression with anxiety


GERD


Hypertension now with hypotension on vasopressor


Diabetes mellitus type 2


Wound culture positive for MRSA


Mild malnutrition





Plan:


Encephalopathy likely related to bacteremia, poor perfusion, acute on chronic 

renal failure likely ATN and acute on chronic systolic/diastolic CHF:  Patient 

stable at this time.  Patient remains lethargic.  Poor urinary output noted. 

Worsening renal function noted. Patient continues on Levophed, IV vancomycin 

and Zosyn.  Blood cultures pending.  Case discussed at length with family.  

Family still trying to decide whether they want to proceed with possible 

dialysis.  Dialysis would be complicated since the patient is on Levophed.  

Gentle dialysis may be required or eventually continuous venovenous 

hemodialysis.  That would require transfer.  Family met with hospice yesterday.

  They are considering possible hospice if her condition will continue to 

decline or not significantly improve over time.  They mention the patient would 

not want to be in this type of condition long-term.  Will discuss further with 

nephrology.  Patient remains DNR. 


Acute on chronic systolic and diastolic CHF:  Patient currently on a Lasix 

drip.  Patient continues with poor urinary output.  Will discuss with 

nephrology.


Acute on chronic renal failure stage 5:  Renal function continues to decline.  

Poor urine output noted. Will discuss with nephrology.  Family still deciding 

on dialysis.  They would like input from Nephrology.


Recent cardiopulmonary arrest with successful resuscitation:  Stable this time.

  Continue as above.  Cardiology felt syncope likely related to orthostatic 

hypotension.  No need for cardiac intervention at this time.


Lung cancer with history of radiation:  Will discuss with pulmonology.  No 

future intervention due to current and long-term status.


Atrial fibrillation on chronic anti coagulation therapy:  Patient on Eliquis.  

Will continue with Eliquis if able to take oral intake.  If not patient will 

require Lovenox.


Depression with anxiety:  Will monitor and address appropriately.


GERD:  Will continue with medication.


Hypertension now with hypotension on vasopressor:  Continue to hold blood 

pressure medication.  Will monitor closely.  Patient continues on Levophed to 

maintain map of 65.


Diabetes mellitus type 2:  Will monitor closely.  Sliding scale in place.


Wound culture positive for MRSA:  Patient now on vancomycin.


Mild malnutrition:  Will encourage oral intake.  Patient may require 

supplementation.


Time Spent Managing Pts Care (In Minutes): 55

## 2019-01-11 NOTE — PN
Date of Progress Note:  01/10/2019



Subjective:  The patient was admitted with hepatic encephalopathy, sepsis, and congestive heart failu
re.  The patient developed cardiac arrest, recovered, and then again had another code yellow.  The pa
shania was placed on Lasix drip over the night.  The patient is also on Levophed.  The patient is stil
l lethargic.  Yesterday, we discontinued all sedation medication and continued on the Lasix drip.  Th
e patient had only around 100 of urine output.  Today, slightly more awake.  The patient is still on 
BiPAP.



Physical Examination:

Vital Signs:  When I saw the patient, blood pressure 110/62, pulse of 88. 

Chest:  Crackles bilateral. 

Heart:  S1, S2.  Systolic murmur. 

Abdomen:  Soft, nontender. 

Extremities:  +3 edema, more on the upper extremity with weeping. 

Genitourinary:  Again, the patient had urine output around 100. 

Neurologic:  Alert.  Follows simple command.  Sleepy.



Current Medications:  The patient is on include Zosyn, vancomycin, Levophed, Eliquis, atorvastatin, L
asix drip at 20 per hour, and pantoprazole.



Laboratory Data:  WBC 11.1, H and H 10.0/32.3, and platelets 165.  Sodium 136, potassium 4.1, bicarb 
27, BUN 72, creatinine 3.6, GFR of 12, calcium 7.7, phosphorus 5.5, and magnesium 2.3.  .  Pro
calcitonin elevated at 2.2.  Culture the patient is growing on the wound MRSA, blood culture being po
sitive.



Assessment And Plan:  

1.Acute kidney injury secondary to toxic acute tubular necrosis, poor perfusion acute tubular necros
is, anuric.  The patient is going to need renal replacement therapy.  I had long discussion with the 
family in the presence of 2 daughters regarding the option of treatment including the need of dialysi
s.  Family has not decided yet.  If family agreed, we will go ahead and start the patient on dialysis
.  I am going to go ahead and give the patient a single dose of Zaroxolyn, try to convert her to nono
liguric, and continue Lasix drip for the time being.

2.Septic shock.  Continue current antibiotic.  Continue supportive treatment with Levophed.

3.Respiratory failure.  Continue BiPAP.

4.Hepatic encephalopathy.  Keep holding all sedation.  Continue current treatment.





MA/MODL

DD:  01/10/2019 21:22:06Voice ID:  [id number]

DT:  01/11/2019 02:25:19Report ID:  [id number]

## 2019-01-11 NOTE — PN
Date of Progress Note:  01/08/2019



Dr. Norris saw Ms. Miilan on January 7 for syncope.  Ordered a pacemaker check.  It was thought that t
he symptoms were secondary to orthostasis.  The pacemaker check was normal without any evidence of dy
sfunction.  She also has an acute kidney injury with hyperkalemia and fluid overload that is improvin
g.  Again, I think the final diagnosis as far as her syncope is probably orthostatic hypotension.  We
 will continue to be available if any questions arise.  We will sign off her case for now.





NB/NATIVIDAD

DD:  01/10/2019 19:24:00Voice ID:  [id number]

DT:  01/11/2019 00:09:17Report ID:  [id number]

## 2019-01-12 LAB
BUN BLD-MCNC: 85 MG/DL (ref 7–18)
GLUCOSE SERPLBLD-MCNC: 204 MG/DL (ref 74–106)
HCT VFR BLD CALC: 32.3 % (ref 36–45)
LYMPHOCYTES # SPEC AUTO: 0.5 K/UL (ref 0.7–4.9)
MAGNESIUM SERPL-MCNC: 2.4 MG/DL (ref 1.8–2.4)
PMV BLD: 8.7 FL (ref 7.6–11.3)
POTASSIUM SERPL-SCNC: 4.1 MMOL/L (ref 3.5–5.1)
RBC # BLD: 4.01 M/UL (ref 3.86–4.86)

## 2019-01-12 RX ADMIN — SODIUM CHLORIDE PRN MLS: 0.9 INJECTION, SOLUTION INTRAVENOUS at 22:17

## 2019-01-12 RX ADMIN — HUMAN INSULIN SCH UNIT: 100 INJECTION, SOLUTION SUBCUTANEOUS at 12:03

## 2019-01-12 RX ADMIN — DEXTROSE MONOHYDRATE SCH MLS: 50 INJECTION, SOLUTION INTRAVENOUS at 10:06

## 2019-01-12 RX ADMIN — DEXTROSE MONOHYDRATE SCH MLS: 50 INJECTION, SOLUTION INTRAVENOUS at 21:35

## 2019-01-12 RX ADMIN — HUMAN INSULIN SCH UNIT: 100 INJECTION, SOLUTION SUBCUTANEOUS at 00:06

## 2019-01-12 RX ADMIN — TRAMADOL HYDROCHLORIDE PRN MG: 50 TABLET, COATED ORAL at 16:40

## 2019-01-12 RX ADMIN — APIXABAN SCH: 2.5 TABLET, FILM COATED ORAL at 09:00

## 2019-01-12 RX ADMIN — DEXTROSE MONOHYDRATE SCH MLS: 50 INJECTION, SOLUTION INTRAVENOUS at 12:05

## 2019-01-12 RX ADMIN — TRAMADOL HYDROCHLORIDE PRN MG: 50 TABLET, COATED ORAL at 22:44

## 2019-01-12 RX ADMIN — Medication SCH ML: at 10:08

## 2019-01-12 RX ADMIN — Medication SCH ML: at 20:40

## 2019-01-12 RX ADMIN — SODIUM CHLORIDE SCH MG: 0.9 INJECTION, SOLUTION INTRAVENOUS at 10:08

## 2019-01-12 RX ADMIN — HUMAN INSULIN SCH UNIT: 100 INJECTION, SOLUTION SUBCUTANEOUS at 17:27

## 2019-01-12 RX ADMIN — HUMAN INSULIN SCH: 100 INJECTION, SOLUTION SUBCUTANEOUS at 07:30

## 2019-01-12 RX ADMIN — APIXABAN SCH MG: 2.5 TABLET, FILM COATED ORAL at 20:39

## 2019-01-12 RX ADMIN — ATORVASTATIN CALCIUM SCH MG: 40 TABLET, FILM COATED ORAL at 20:39

## 2019-01-12 RX ADMIN — TAZOBACTAM SODIUM AND PIPERACILLIN SODIUM SCH MLS: 250; 2 INJECTION, SOLUTION INTRAVENOUS at 01:40

## 2019-01-12 RX ADMIN — TAZOBACTAM SODIUM AND PIPERACILLIN SODIUM SCH MLS: 250; 2 INJECTION, SOLUTION INTRAVENOUS at 17:26

## 2019-01-12 RX ADMIN — SODIUM CHLORIDE PRN MLS: 0.9 INJECTION, SOLUTION INTRAVENOUS at 10:07

## 2019-01-12 RX ADMIN — APIXABAN SCH MG: 2.5 TABLET, FILM COATED ORAL at 13:12

## 2019-01-12 RX ADMIN — TAZOBACTAM SODIUM AND PIPERACILLIN SODIUM SCH MLS: 250; 2 INJECTION, SOLUTION INTRAVENOUS at 10:10

## 2019-01-12 RX ADMIN — HUMAN INSULIN SCH UNIT: 100 INJECTION, SOLUTION SUBCUTANEOUS at 20:39

## 2019-01-12 RX ADMIN — DEXTROSE MONOHYDRATE SCH MLS: 50 INJECTION, SOLUTION INTRAVENOUS at 01:39

## 2019-01-12 RX ADMIN — Medication SCH PATCH: at 10:10

## 2019-01-12 RX ADMIN — Medication SCH ML: at 00:07

## 2019-01-12 RX ADMIN — DEXTROSE MONOHYDRATE SCH MLS: 50 INJECTION, SOLUTION INTRAVENOUS at 17:27

## 2019-01-12 NOTE — P.PN
Subjective


Date of Service: 01/12/19


Primary Care Provider: unknown


Chief Complaint: Respiratory distress





Still with increased lethargy.  The patient remains on Levophed drip.  Urinary 

output poor.





Physical Examination





- Vital Signs


Temperature: 99.3 F


Blood Pressure: 111/47


Pulse: 70


Respirations: 20


Pulse Ox (%): 93





- Physical Exam


General: Other (Slight confusion but cooperative)


HEENT: Atraumatic


Neck: Supple


Respiratory: Crackles/rales (Bilateral)


Cardiovascular: Normal pulses, Regular rate/rhythm


Gastrointestinal: Normal bowel sounds, Soft and benign, Non-distended, No masses

, No rebound, No guarding


Integumentary: Tenderness/swelling (Anasarca)





- Studies


Medications List Reviewed: Yes





Assessment & Plan


Discharge Plan: Other (Hospice)


Plan to discharge in: 24 Hours


Physician Review Additional Text: 





Impression:


Encephalopathy likely related to bacteremia, poor perfusion, acute on chronic 

renal failure likely ATN and acute on chronic systolic/diastolic CHF


Acute on chronic systolic and diastolic CHF


Acute on chronic renal failure stage 5


Recent cardiopulmonary arrest with successful resuscitation


Lung cancer with history of radiation


Atrial fibrillation on chronic anti coagulation therapy


Depression with anxiety


GERD


Hypertension now with hypotension on vasopressor


Diabetes mellitus type 2


Wound culture positive for MRSA


Mild malnutrition





Plan:


Encephalopathy likely related to bacteremia, poor perfusion, acute on chronic 

renal failure likely ATN and acute on chronic systolic/diastolic CHF:  Patient 

stable at this time but remains on Levophed, IV vancomycin and Zosyn.  Blood 

cultures pending.  Case discussed at length with family again yesterday.  Case 

also discussed at length with nephrology.  Patient will likely not benefit with 

dialysis long-term plus it may be difficult to provide.  This was discussed in 

detail with the family.  Family understands her current comorbid condition and 

prognosis.  Family leaning towards hospice.  Other family members to discuss 

further.  Possible withdrawal of care soon likely as early as tomorrow. Patient 

remains DNR.  I will turn the service over to Dr. Carpenter tomorrow.  I will go 

over the plan of care with her.


Acute on chronic systolic and diastolic CHF:  Patient currently on a Lasix 

drip.  Patient continues with poor urinary output.  Case discussed with 

nephrology.  Patient given extra doses of diuretic therapy.


Acute on chronic renal failure stage 5:  Renal function continues to decline.  

Poor urine output noted. Will discuss with nephrology and family.  Dialysis 

likely not to make any significant difference in the future.


Recent cardiopulmonary arrest with successful resuscitation:  Stable this time.

  Continue as above.  Cardiology felt syncope likely related to orthostatic 

hypotension.  No need for cardiac intervention at this time.


Lung cancer with history of radiation:  Will discuss with pulmonology.  No 

future intervention due to current and long-term status.


Atrial fibrillation on chronic anti coagulation therapy:  Patient on Eliquis.  

Will continue with Eliquis if able to take oral intake.  If not patient will 

require Lovenox.


Depression with anxiety:  Will monitor and address appropriately.


GERD:  Will continue with medication.


Hypertension now with hypotension on vasopressor:  Continue to hold blood 

pressure medication.  Will monitor closely.  Patient continues on Levophed to 

maintain map of 65.


Diabetes mellitus type 2:  Will monitor closely.  Sliding scale in place.


Wound culture positive for MRSA:  Patient now on vancomycin.


Mild malnutrition:  Will encourage oral intake.  Patient may require 

supplementation.  Will hold off on oral additional supplementation as the 

patient's family may consider hospice


Time Spent Managing Pts Care (In Minutes): 55

## 2019-01-12 NOTE — PN
Date of Progress Note:  01/12/2019



Subjective:  The patient is still lethargic, but more awake today.  Still BiPAP dependent.  Required 
Levophed of 5 mcg.  The patient started having better urine output.



Physical Examination:

Vital Signs:  Blood pressure 107/46, pulse of 70.  The patient had urine output of 600. 

Chest:  Crackles bilateral. 

Heart:  S1, S2.  Systolic murmur. 

Abdomen:  Soft, nontender, ascites. 

Extremities:  +2 edema, more prominent on the upper extremities.



Laboratory Data:  WBC 9.3, H and H 10.1/32.3, platelets 125.  Sodium 136, potassium 4.1, bicarb 26, B
UN 85, creatinine of 4, calcium 7.6, magnesium 2.3.



Current Medications:  The patient on include Zosyn, vancomycin, Levophed, Eliquis, atorvastatin, Lasi
x drip at 40 per hour, Zofran, pantoprazole.



Assessment And Plan:  

1.Acute kidney injury secondary to toxic acute tubular necrosis, poor perfusion, acute tubular necro
sis secondary to low blood pressure, questionable of hepatorenal.  Currently nonoliguric.  I going to
 continue Lasix drip.  I will add metolazone for today and we will follow up.  Apparently with all mu
ltiple comorbid conditions including the cirrhosis and the infection, the patient is going to be poor
 prognosis overall.  The patient's family is more leaned towards hospice care and comfort care.  Wait
ing for other family member for final decision.  Currently, we will continue current treatment and we
 will follow up.

2.Septic shock.  Continue current antibiotic, dose appropriate.  I going to follow up vancomycin tro
ugh.

3.Cirrhosis, as by primary.

4.Anasarca secondary to hepatorenal.  Continue diuresis.

5.Altered mental status secondary to metabolic encephalopathy.  Continue supportive care.





MA/MODL

DD:  01/12/2019 13:32:09Voice ID:  [id number]

DT:  01/12/2019 14:24:54Report ID:  [id number]

## 2019-01-13 ENCOUNTER — HOSPITAL ENCOUNTER (INPATIENT)
Dept: HOSPITAL 97 - 3RD-ICU | Age: 84
DRG: 951 | End: 2019-01-13
Attending: INTERNAL MEDICINE | Admitting: INTERNAL MEDICINE
Payer: COMMERCIAL

## 2019-01-13 VITALS — BODY MASS INDEX: 47.5 KG/M2

## 2019-01-13 VITALS — SYSTOLIC BLOOD PRESSURE: 49 MMHG | DIASTOLIC BLOOD PRESSURE: 29 MMHG

## 2019-01-13 VITALS — TEMPERATURE: 98.1 F | DIASTOLIC BLOOD PRESSURE: 72 MMHG | SYSTOLIC BLOOD PRESSURE: 106 MMHG

## 2019-01-13 VITALS — OXYGEN SATURATION: 100 %

## 2019-01-13 DIAGNOSIS — I13.2: ICD-10-CM

## 2019-01-13 DIAGNOSIS — Z51.5: Primary | ICD-10-CM

## 2019-01-13 DIAGNOSIS — Z85.118: ICD-10-CM

## 2019-01-13 DIAGNOSIS — N18.6: ICD-10-CM

## 2019-01-13 DIAGNOSIS — E11.22: ICD-10-CM

## 2019-01-13 DIAGNOSIS — I50.9: ICD-10-CM

## 2019-01-13 LAB
BUN BLD-MCNC: 96 MG/DL (ref 7–18)
GLUCOSE SERPLBLD-MCNC: 281 MG/DL (ref 74–106)
MAGNESIUM SERPL-MCNC: 2.3 MG/DL (ref 1.8–2.4)
POTASSIUM SERPL-SCNC: 4.1 MMOL/L (ref 3.5–5.1)

## 2019-01-13 RX ADMIN — MORPHINE SULFATE SCH MG: 4 INJECTION, SOLUTION INTRAMUSCULAR; INTRAVENOUS at 10:43

## 2019-01-13 RX ADMIN — TAZOBACTAM SODIUM AND PIPERACILLIN SODIUM SCH MLS: 250; 2 INJECTION, SOLUTION INTRAVENOUS at 09:02

## 2019-01-13 RX ADMIN — TAZOBACTAM SODIUM AND PIPERACILLIN SODIUM SCH MLS: 250; 2 INJECTION, SOLUTION INTRAVENOUS at 00:06

## 2019-01-13 RX ADMIN — MORPHINE SULFATE SCH: 4 INJECTION, SOLUTION INTRAMUSCULAR; INTRAVENOUS at 15:00

## 2019-01-13 RX ADMIN — Medication SCH PATCH: at 09:01

## 2019-01-13 RX ADMIN — Medication SCH ML: at 09:02

## 2019-01-13 RX ADMIN — SODIUM CHLORIDE SCH MG: 0.9 INJECTION, SOLUTION INTRAVENOUS at 09:01

## 2019-01-13 RX ADMIN — HUMAN INSULIN SCH UNIT: 100 INJECTION, SOLUTION SUBCUTANEOUS at 09:01

## 2019-01-13 RX ADMIN — SODIUM CHLORIDE SCH: 9 INJECTION, SOLUTION INTRAVENOUS at 09:00

## 2019-01-13 RX ADMIN — APIXABAN SCH MG: 2.5 TABLET, FILM COATED ORAL at 09:01

## 2019-01-13 RX ADMIN — DEXTROSE MONOHYDRATE SCH MLS: 50 INJECTION, SOLUTION INTRAVENOUS at 03:37

## 2019-01-13 RX ADMIN — MORPHINE SULFATE SCH MG: 4 INJECTION, SOLUTION INTRAMUSCULAR; INTRAVENOUS at 13:03

## 2019-01-13 RX ADMIN — DEXTROSE MONOHYDRATE SCH MLS: 50 INJECTION, SOLUTION INTRAVENOUS at 09:01

## 2019-01-13 NOTE — XMS REPORT
Clinical Summary

 Created on:2019



Patient:Lianne Milian

Sex:Female

:1935

External Reference #:[id number]





Demographics







 Address  [address]



   [place] [address]

 

 Home Phone  [phone]

 

 Home Phone  [phone]

 

 Email Address  [email or web address]

 

 Preferred Language  English

 

 Marital Status  

 

 Taoism Affiliation  Unknown

 

 Race  White

 

 Ethnic Group  Not  or 









Author







 Organization  [facility]

 

 Address  [address] [place] [address]









Support







 Name  Relationship  Address  Phone

 

 Nayely Milian  Child  [address]  [phone]



     [place] [address]  









Care Team Providers







 Name  Role  Phone

 

 Terry Gay MD  Primary Care Provider  [phone]









Allergies







 Active Allergy  Reactions  Severity  Noted Date  Comments

 

 Propafenone      2016  







Medications







 Medication  Sig  Dispensed  Refills  Start Date  End Date  Status

 

 metFORMIN (GLUCOPHAGE)  Take 500 mg by    0      Active



 500 MG tablet  mouth 2 (two)          



   times a day with          



   meals.          

 

 lisinopril  Take 10 mg by    0      Active



 (PRINIVIL,ZESTRIL) 10 MG  mouth daily.          



 tablet            

 

 apixaban (ELIQUIS) 5 mg  Take 2.5 mg by    0      Active



 tablet  mouth 2 (two)          



   times a day.          

 

 sertraline (ZOLOFT) 50  Take 50 mg by    0      Active



 MG tablet  mouth daily.          

 

 atorvastatin (LIPITOR)  Take 40 mg by    0      Active



 40 MG tablet  mouth daily with          



   dinner.          

 

 lansoprazole (PREVACID)  Take 30 mg by    0      Active



 30 MG capsule  mouth daily          



   before          



   breakfast.          

 

 diphenhydrAMINE  Take 25 mg by    0      Active



 (BENADRYL) 25 mg tablet  mouth nightly as          



   needed for          



   sleep.          

 

 cyanocobalamin 1000 MCG  Take 1,000 mcg    0      Active



 tablet  by mouth daily.          







Active Problems







 Problem  Noted Date

 

 Intertrochanteric fracture of left femur  2017

 

 Systolic heart failure, chronic  2017

 

 Acute kidney injury superimposed on CKD  2017

 

 Type 2 diabetes mellitus with hyperglycemia  2017

 

 Hip fracture requiring operative repair  2017







Social History







 Tobacco Use  Types  Packs/Day  Years Used  Date

 

 Never Smoker        









 Alcohol Use  Drinks/Week  oz/Week  Comments

 

 No      









 Sex Assigned at Birth  Date Recorded

 

 Not on file  









 Job Start Date  Occupation  Industry

 

 Not on file  Not on file  Not on file









 Travel History  Travel Start  Travel End









 No recent travel history available.







Last Filed Vital Signs

Not on file



Plan of Treatment







 Health Maintenance  Due Date  Last Done  Comments

 

 DIABETIC RETINAL EYE EXAM  1935    

 

 DIABETIC FOOT EXAM  1945    

 

 SHINGLES VACCINES (1 of 2)  1985    

 

 PNEUMOCOCCAL POLYSACCHARIDE VACCINE AGE 65 AND OVER  2000    

 

 PNEUMOCOCCAL-13  2000    

 

 INFLUENZA VACCINE  2018    







Implants







 Implanted  Type  Area    Device  Shelf  Model /



         Identifier  Expiration  Serial /



           Date  Lot

 

 T2 F/T Locking Screw 5mmx32.5mm - [id number]  IPM IMPLANT  Left:  SIRENA      
1896 [id number] /



 Implanted: 2017 (Quantity not on file)  DEVICES  Femur  ORTHOPEDICS     
  /

 

 Nail Im Trchntrc W/ Set Scr Ti 130deg [id number] Strl - [id number]  
Orthopedic  Left:  SIRENA    2022  3130 [id number] /



 Implanted: 2017 (Quantity not on file)  Trauma  Femur  ORTHOPEDICS       
/



   Implants          [id number]

 

 Shaft Trnkl Modlr 448mm Strl - [id number]  Orthopedic  N/A: N/A  SIRENA      0227 3000S /



 Implanted: Qty: 1 on 2017 by Woo Bee MD  Trauma    ORTHOPEDICS   
    /



   Implants          [id number]

 

 Screw Bone Lag Ti [id number] - [id number]  Orthopedic  Left:  SIRENA      3060 
0100S /



 Implanted: 2017 (Quantity not on file)  Trauma  Femur  ORTHOPEDICS       
/



   Implants          







Results

Not on fileafter 2018



Insurance







 Payer  Benefit Plan / Group  Subscriber ID  Type  Phone  Address

 

 MEDICARE  MEDICARE PART A AND B  xxxxxxxxxx  Medicare [place], [place]

 

 AETNA  AETNA HMO,POS,EPO, MC/EC  xxxxxx  HMO    









 Guarantor Name  Account Type  Relation to  Date of Birth  Phone  Billing



     Patient      Address

 

 Lianne Milian  Personal/Family  Self  1935  [phone]  [address]







         (Home)  [place] [address]







Advance Directives

Patient has advance care planning documents on file. For more information, 
please contact:[place] [address]

## 2019-01-13 NOTE — XMS REPORT
Continuity of Care Document

 Created on:2017



Patient:VALARIE VALENCIA

Sex:Female

:1935

External Reference #:[id number]





Demographics







 Address  [address] [place] [address]

 

 Phone  [phone]

 

 Preferred Language  Unknown

 

 Marital Status  Unknown

 

 Sikhism Affiliation  Unknown

 

 Race  Unknown

 

 Ethnic Group  Unknown









Author







 Organization  [facility]









Problems







 Problem  Status  Onset  Classification  Date  Comments  Source



     Date    Reported    



             



             

 

 Discharge    2017    [facility]



 Diagnosis:    7        



 Complication of            



 [place]rera catheter            



             



             

 

 CATHETER PAIN  Active          [facility]



     7        [place]



             



             







Medications







 Medication  Details  Route  Status  Patient  Ordering  Order  Source



         Instructions  Provider  Date  



               



               



               

 

 Acetaminophen  1 tab,    Inactive        [facility]



 300 MG / Codeine  Route: PO,          017  [facility]



 Phosphate 30 MG  Drug Form:            



 Oral Tablet  TAB,            



 [Tylenol with  Dosing            



 Codeine #3]  Weight            



   81.818,            



   kg, ONCE,            



   STAT,            



   Start            



   date:            



   17            



   21:22:00            



   CDT, Stop            



   date:            



   17            



   21:22:00            



   CDTNotes:            



   Do not            



   exceed            



   4gm/day of            



   acetaminop            



   hen.            



   (Same as:            



   Tylenol            



   with            



   Codeine #            



   3)            



               



               







Allergies, Adverse Reactions, Alerts







 Substance  Category  Reaction  Severity  Reaction  Status  Date  Comments  
Source



         type    Reported    



                 



                 



                 

 

 Rythmol  Assertion      Drug  Active      



         allergy        [facility]



                 



                 



                 







Immunizations







 Immunization  Date Given  Site  Status  Last Updated  Comments  Source



             



             







Results







 Order  Results  Value  Reference  Date  Interpretation  Comments  Source



 Name      Range        



               



               







Vital Signs







 Vital Sign  Value  Date  Comments  Source



         

 

 Heart Rate  61  2017    [facility]



         



         

 

 Respitory Rate  18  2017    [facility]



         



         

 

 Temperature Oral (F)  98.2 F  2017    [facility]



         



         

 

 Systolic (mm Hg)  132  2017    [facility]



         



         

 

 Diastolic (mm Hg)  76  2017    [facility]



         



         

 

 Systolic (mm Hg)  142  2017    [facility]



         



         

 

 Diastolic (mm Hg)  72  2017    [facility]



         



         

 

 Respitory Rate  18  2017    [facility]



         



         

 

 Heart Rate  64  2017    [facility]



         



         

 

 Temperature Oral (F)  98.2 F  2017    [facility]



         



         

 

 Heart Rate  62  2017    [facility]



         



         

 

 Systolic (mm Hg)  152  2017    [facility]



         



         

 

 Diastolic (mm Hg)  68  2017    [facility]



         



         

 

 Respitory Rate  18  2017    [facility]



         



         

 

 BMI Calculated  29.11  2017    [facility]



         



         

 

 Height  167.64 cm  2017    [facility]



         



         

 

 Weight  81.818  2017    [facility]



         



         

 

 Temperature Oral (F)  98.0 F  2017    [facility]



         



         







Encounters







 Location  Location  Encounter  Encounter  Reason  Attending  ADM  DC  Status  
Source



   Details  Type  Number  For  Provider  Date  Date    



         Visit          



                   



                   



                   

 

 [facility]  [id number]    Ganesh      MARILUZ Braxton  /2017    [facility]                  



                   



                   







Procedures







 Procedure  Code  Date  Perfomer  Comments  Source



           



           

 

 Hip arthroplasty  [id number]        [facility]

## 2019-01-13 NOTE — P.SSS
Patient History


Date of Service: 19


Reason for admission: HOSPICE ADMISSION FOR ACUTE RENAL FAILURE


History of Present Illness: 





MS. VALENCIA WAS CHANGED FROM DR. VERDIN TO ME FOR [facility] HOSPICE ADMISSION.  THIS IS 

FOR HYPOTENSION, ACUTE RENAL FAILURE.  NURSE SAW HER IN AM AND AT 9:30 I SAW 

HER.  SHE DEFINITELY BELONGS TO HOSPICE.  HER BP IS LOW AT 90 SYSTOLIC ON 

LEVOPHED DRIP. FAMILY HAS RIGHTLY DECIDED NOT TO INTERVENE AGAIN.  SHE ALREADY 

WAS CODED ONCE AND HAD CPR.  I TALKED TO FAMILY IN DETAIL AND ADVISED THAT THEY 

MADE A RIGHT DECISION.  AT THIS AGE WITH TERMINAL DIAGNOSIS SHE SHOULD NOT BE 

IN [place] ANY LONGER.  SHE IS READY TO GO. I TOLD THEM SHE WILL NOT LAST MORE 

THAN A DAY OFF THE LEVOPHED.  SHE AT 83 WITH LEVOPHED TO SUPPORT HER IS NOT A 

DIALYSIS CANDIDATE ANYWAY.  SHE  WITHIN 5 HOURS AFTER LEVOPHED WAS DCED.

  ALL FAMILY MEMBERS WERE AT [place] WITH HER COMFORTABLE DEATH.


Allergies





amitriptyline Allergy (Verified 18 22:33)


 Unknown


rosiglitazone maleate [From Avandia] Allergy (Verified 18 22:33)


 Anaphylaxis


sitagliptin phosphate [From Januvia] Allergy (Verified 18 22:33)


 Nausea/Vomiting


propafenone HCl [From Rythmol] Adverse Reaction (Verified 18 22:33)


 Shortness of breath


Tape Allergy (Uncoded 18 22:33)


 Itching/Hives/Rash





Home Medications: 








ALPRAZolam [Alprazolam] 1 tab PO BEDTIME 19 


Apixaban [Eliquis] 1 tab PO BID 19 


Atorvastatin Calcium [Lipitor] 1 tab PO BEDTIME 19 


Carvedilol 1 tab PO BID 19 


Furosemide 1 tab PO DAILY 19 


Gabapentin 1 cap PO TID 19 


Lansoprazole 1 cap PO DAILY 19 


Sertraline HCl 1 tab PO DAILY 19 








- Past Medical/Surgical History


Has patient received pneumonia vaccine in the past: Yes


Diabetic: Yes


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


-: DM


-: HTN


-: Atrial fibrillation


-: Congestive heart failure


-: hyperlipidemia


-: Kidney disease


-: pneumonia


-: depression/anxiety


-: History of lung cancer treated with radiation on the right side


-: Pacemaker


-: Cataract sx


-: R. leg fracture


-: left hip surgery





- Family History


  ** Mother


-: Cancer


Notes: cervical cancer





  ** Father


-: Heart disease, Diabetes





- Social History


Smoking Status: Current some day smoker


Alcohol use: No


CD- Drugs: No


Caffeine use: No


Place of Residence: Home





Review of Systems


is unable to be obtained (SEMICOMATOSE)





Physical Examination





- Vital Signs


Blood Pressure: 49/29


Pulse: 70


Respirations: 16





- Physical Exam


General: Mild distress, Obese


Respiratory: Diminished, Crackles/rales


Cardiovascular: Normal S1 S2





- Diagnosis (Problem(s))


(1) ESRF (end stage renal failure)


Status: Acute   


Plan: 


 WITHIN 5 HOURS WITH ABOVE TERMINAL DIAGNOSIS. 


SHE WAS KEPT COMFORTABLE WITH HOSPICE PROTOCOL RELATED MEDS.








- Disposition


Disposition:

## 2025-06-06 NOTE — P.PN
Pt lvm asking if there is something else she can try besides Trulicity. Pt states Trulicity cost over $200 dollars and she can not afford that. Please advise   Subjective


Date of Service: 01/07/19


Chief Complaint: Abnormal chest x-ray


Subjective: Improving (Patient is doing well extubated yesterday no new 

complaint)





Review of Systems


General: Weakness


Respiratory: Cough, Shortness of Breath





Physical Examination





- Vital Signs


Temperature: 97 F


Blood Pressure: 112/59


Pulse: 70


Respirations: 16


Pulse Ox (%): 100





- Physical Exam


General: Alert, Oriented x3


Respiratory: Clear to auscultation bilaterally


Cardiovascular: No edema, Regular rate/rhythm





- Studies


Microbiology Data (last 24 hrs): 








01/02/19 06:55   Blood  - Blood   Aerobic Blood Culture - Final


                            No growth in 5 days.


01/02/19 06:55   Blood  - Blood   Anaerobic Blood Culture - Final


                            No growth in 5 days.





Medications List Reviewed: Yes





Assessment & Plan





- Problems (Diagnosis)


(1) Respiratory failure


Current Visit: Yes   Status: Resolved   


Plan: 


Patient extubated yesterday alert oriented responsive denies any pulmonary 

complaints labs reviewed renal insufficiency renal function was reduce dose of 

Lasix


Qualifiers: 


   Chronicity: acute 





(2) Lung cancer


Current Visit: Yes   Status: Acute   


Plan: 


Patient has a very large right-sided hilar mass suspicious of lung cancer 

according to the patient she was diagnosed and treated in [place] and [place] 

with radiation will try and obtain records and discussed with daughter 

regarding further biopsy


Qualifiers: 


   Laterality: right